# Patient Record
Sex: FEMALE | Race: WHITE | NOT HISPANIC OR LATINO | Employment: FULL TIME | URBAN - METROPOLITAN AREA
[De-identification: names, ages, dates, MRNs, and addresses within clinical notes are randomized per-mention and may not be internally consistent; named-entity substitution may affect disease eponyms.]

---

## 2017-03-20 RX ORDER — LEVOTHYROXINE SODIUM 50 MCG
TABLET ORAL
Qty: 90 TABLET | Refills: 0 | Status: SHIPPED | OUTPATIENT
Start: 2017-03-20 | End: 2018-01-08 | Stop reason: SDUPTHER

## 2017-07-19 ENCOUNTER — TRANSCRIBE ORDERS (OUTPATIENT)
Dept: MAMMOGRAPHY | Facility: HOSPITAL | Age: 47
End: 2017-07-19

## 2017-07-19 DIAGNOSIS — Z13.9 SCREENING: Primary | ICD-10-CM

## 2017-08-15 ENCOUNTER — HOSPITAL ENCOUNTER (OUTPATIENT)
Dept: MAMMOGRAPHY | Facility: HOSPITAL | Age: 47
Discharge: HOME | End: 2017-08-15
Attending: INTERNAL MEDICINE | Admitting: INTERNAL MEDICINE

## 2017-08-15 DIAGNOSIS — Z13.9 SCREENING: ICD-10-CM

## 2017-08-15 PROCEDURE — 77063 BREAST TOMOSYNTHESIS BI: CPT

## 2017-08-15 PROCEDURE — 77063 BREAST TOMOSYNTHESIS BI: CPT | Performed by: RADIOLOGY

## 2017-08-15 PROCEDURE — G0202 SCR MAMMO BI INCL CAD: HCPCS

## 2017-08-15 PROCEDURE — 77067 SCR MAMMO BI INCL CAD: CPT | Performed by: RADIOLOGY

## 2017-10-14 DIAGNOSIS — E03.9 HYPOTHYROIDISM (ACQUIRED): ICD-10-CM

## 2017-10-14 RX ORDER — LEVOTHYROXINE SODIUM 0.05 MG/1
TABLET ORAL
Qty: 30 TABLET | Refills: 0 | Status: SHIPPED | OUTPATIENT
Start: 2017-10-14 | End: 2017-11-12 | Stop reason: SDUPTHER

## 2017-11-12 DIAGNOSIS — E03.9 HYPOTHYROIDISM (ACQUIRED): ICD-10-CM

## 2017-11-12 RX ORDER — LEVOTHYROXINE SODIUM 0.05 MG/1
TABLET ORAL
Qty: 30 TABLET | Refills: 0 | Status: SHIPPED | OUTPATIENT
Start: 2017-11-12 | End: 2017-12-13 | Stop reason: SDUPTHER

## 2017-12-13 DIAGNOSIS — E03.9 HYPOTHYROIDISM (ACQUIRED): ICD-10-CM

## 2017-12-13 RX ORDER — LEVOTHYROXINE SODIUM 0.05 MG/1
TABLET ORAL
Qty: 30 TABLET | Refills: 0 | Status: SHIPPED | OUTPATIENT
Start: 2017-12-13 | End: 2018-01-14 | Stop reason: SDUPTHER

## 2018-01-08 ENCOUNTER — OFFICE VISIT (OUTPATIENT)
Dept: ENDOCRINOLOGY | Facility: CLINIC | Age: 48
End: 2018-01-08

## 2018-01-08 VITALS
WEIGHT: 166 LBS | SYSTOLIC BLOOD PRESSURE: 120 MMHG | BODY MASS INDEX: 29.41 KG/M2 | DIASTOLIC BLOOD PRESSURE: 74 MMHG | HEART RATE: 77 BPM | OXYGEN SATURATION: 99 %

## 2018-01-08 DIAGNOSIS — E03.9 ACQUIRED HYPOTHYROIDISM: Primary | Chronic | ICD-10-CM

## 2018-01-08 DIAGNOSIS — E78.2 MIXED HYPERLIPIDEMIA: Chronic | ICD-10-CM

## 2018-01-08 DIAGNOSIS — E55.9 VITAMIN D DEFICIENCY: ICD-10-CM

## 2018-01-08 LAB
25(OH)D3 SERPL-MCNC: 35.7 NG/ML
ALBUMIN SERPL-MCNC: 4.2 G/DL (ref 3.2–4.8)
ALBUMIN/GLOB SERPL: 1.8 G/DL (ref 1.5–2.5)
ALP SERPL-CCNC: 82 U/L (ref 25–100)
ALT SERPL W P-5'-P-CCNC: 17 U/L (ref 7–40)
ANION GAP SERPL CALCULATED.3IONS-SCNC: 8 MMOL/L (ref 3–11)
ARTICHOKE IGE QN: 111 MG/DL (ref 0–130)
AST SERPL-CCNC: 21 U/L (ref 0–33)
BILIRUB SERPL-MCNC: 0.3 MG/DL (ref 0.3–1.2)
BUN BLD-MCNC: 16 MG/DL (ref 9–23)
BUN/CREAT SERPL: 16 (ref 7–25)
CALCIUM SPEC-SCNC: 9 MG/DL (ref 8.7–10.4)
CHLORIDE SERPL-SCNC: 104 MMOL/L (ref 99–109)
CHOLEST SERPL-MCNC: 210 MG/DL (ref 0–200)
CO2 SERPL-SCNC: 24 MMOL/L (ref 20–31)
CREAT BLD-MCNC: 1 MG/DL (ref 0.6–1.3)
GFR SERPL CREATININE-BSD FRML MDRD: 59 ML/MIN/1.73
GLOBULIN UR ELPH-MCNC: 2.3 GM/DL
GLUCOSE BLD-MCNC: 89 MG/DL (ref 70–100)
HDLC SERPL-MCNC: 91 MG/DL (ref 40–60)
POTASSIUM BLD-SCNC: 3.9 MMOL/L (ref 3.5–5.5)
PROT SERPL-MCNC: 6.5 G/DL (ref 5.7–8.2)
SODIUM BLD-SCNC: 136 MMOL/L (ref 132–146)
T4 FREE SERPL-MCNC: 1.27 NG/DL (ref 0.89–1.76)
TRIGL SERPL-MCNC: 169 MG/DL (ref 0–150)
TSH SERPL DL<=0.05 MIU/L-ACNC: 1.11 MIU/ML (ref 0.35–5.35)

## 2018-01-08 PROCEDURE — 86376 MICROSOMAL ANTIBODY EACH: CPT | Performed by: INTERNAL MEDICINE

## 2018-01-08 PROCEDURE — 82306 VITAMIN D 25 HYDROXY: CPT | Performed by: INTERNAL MEDICINE

## 2018-01-08 PROCEDURE — 84439 ASSAY OF FREE THYROXINE: CPT | Performed by: INTERNAL MEDICINE

## 2018-01-08 PROCEDURE — 80061 LIPID PANEL: CPT | Performed by: INTERNAL MEDICINE

## 2018-01-08 PROCEDURE — 86800 THYROGLOBULIN ANTIBODY: CPT | Performed by: INTERNAL MEDICINE

## 2018-01-08 PROCEDURE — 84443 ASSAY THYROID STIM HORMONE: CPT | Performed by: INTERNAL MEDICINE

## 2018-01-08 PROCEDURE — 99213 OFFICE O/P EST LOW 20 MIN: CPT | Performed by: INTERNAL MEDICINE

## 2018-01-08 PROCEDURE — 80053 COMPREHEN METABOLIC PANEL: CPT | Performed by: INTERNAL MEDICINE

## 2018-01-08 RX ORDER — TRAZODONE HYDROCHLORIDE 100 MG/1
.5-1 TABLET ORAL NIGHTLY
COMMUNITY
Start: 2017-12-24 | End: 2021-09-03 | Stop reason: SINTOL

## 2018-01-08 RX ORDER — FLUOXETINE HYDROCHLORIDE 40 MG/1
1 CAPSULE ORAL DAILY
COMMUNITY
Start: 2017-12-24 | End: 2021-09-23 | Stop reason: SDUPTHER

## 2018-01-08 NOTE — PROGRESS NOTES
Mona Wilkerson 47 y.o.  [facility]: Follow-up (Hypothyroidism)    [place]: Follow-up (Hypothyroidism)    On synthroid 50 mcg daily   Last lab work was 1 year ago   Is also vitamin D deficient on supplement   Is on low fat diet    Allergies   Allergen Reactions   • Other      Uncoded Nonscreenable Allergen       Current Outpatient Prescriptions:   •  BIOTIN ULTRA STRENGTH PO, Take  by mouth., Disp: , Rfl:   •  Cholecalciferol (VITAMIN D3) 5000 UNITS capsule capsule, Take  by mouth., Disp: , Rfl:   •  FLUoxetine (PROzac) 40 MG capsule, Take 1 capsule by mouth Daily., Disp: , Rfl:   •  levothyroxine (SYNTHROID, LEVOTHROID) 50 MCG tablet, TAKE 1 TABLET BY MOUTH EVERY DAY, Disp: 30 tablet, Rfl: 0  •  ranitidine (ZANTAC) 75 MG tablet, Take 1 tablet by mouth 2 (two) times a day., Disp: , Rfl:   •  traZODone (DESYREL) 100 MG tablet, Take 1 tablet by mouth Daily., Disp: , Rfl:   Patient Active Problem List    Diagnosis   • Anxiety [F41.9]   • Primary insomnia [F51.01]   • Cyst of ovary [N83.209]   • Vitamin D deficiency [E55.9]   • Hypothyroidism [E03.9]     Overview Note:     Impression: 03/09/2016 - check tfts  Impression: 01/26/2016 - reviewed tfts over past 2 years, with tsh 2.0 in 2014   current tsh 0.6 - in good range   no change for now but recc recheck in 6 weeks on lithium  Impression: 12/31/2015 - update tfts on supplement   continue supplement for now- improved mood on naturethyroid- was suicidally depressed previously now resolved.  Is still depressed but denies suicidal thoughts or plan   is agreeable with appt [facility];   Check TFTs     • Depression with anxiety [F41.8]     Overview Note:     seeing [facility]- doing well  off lithium  changed to lamictal getting adhd testing     • Hyperlipidemia [E78.5]     Overview Note:     26 Jan 2016  reviewed lipid panel with her      • GERD without esophagitis [K21.9]     Review of Systems   Constitutional: Negative for activity change, appetite change, chills,  diaphoresis, fatigue, fever and unexpected weight change.   HENT: Negative for congestion, dental problem, drooling, ear discharge, ear pain, facial swelling, hearing loss, mouth sores, nosebleeds, postnasal drip, rhinorrhea, sinus pressure, sneezing, sore throat, tinnitus, trouble swallowing and voice change.    Eyes: Negative for photophobia, pain, discharge, redness, itching and visual disturbance.   Respiratory: Negative for apnea, cough, choking, chest tightness, shortness of breath, wheezing and stridor.    Cardiovascular: Negative for chest pain, palpitations and leg swelling.   Gastrointestinal: Negative for abdominal distention, abdominal pain, anal bleeding, blood in stool, constipation, diarrhea, nausea, rectal pain and vomiting.   Endocrine: Negative for cold intolerance, heat intolerance, polydipsia, polyphagia and polyuria.   Genitourinary: Negative for decreased urine volume, difficulty urinating, dysuria, enuresis, flank pain, frequency, genital sores, hematuria and urgency.   Musculoskeletal: Negative for arthralgias, back pain, gait problem, joint swelling, myalgias, neck pain and neck stiffness.   Skin: Negative for color change, pallor, rash and wound.   Allergic/Immunologic: Negative for environmental allergies, food allergies and immunocompromised state.   Neurological: Negative for dizziness, tremors, seizures, syncope, facial asymmetry, speech difficulty, weakness, light-headedness, numbness and headaches.   Hematological: Negative for adenopathy. Does not bruise/bleed easily.   Psychiatric/Behavioral: Negative for agitation, behavioral problems, confusion, decreased concentration, dysphoric mood, hallucinations, self-injury, sleep disturbance and suicidal ideas. The patient is not nervous/anxious and is not hyperactive.      Social History     Social History   • Marital status:      Spouse name: N/A   • Number of children: N/A   • Years of education: N/A     Occupational History   •  Not on file.     Social History Main Topics   • Smoking status: Former Smoker     Types: Cigarettes   • Smokeless tobacco: Never Used      Comment: Tobacco use   • Alcohol use No   • Drug use: No   • Sexual activity: Not Currently     Partners: Male     Birth control/ protection: None     Other Topics Concern   • Not on file     Social History Narrative     Family History   Problem Relation Age of Onset   • Thyroid disease Mother    • Breast cancer Mother 53   • Hypertension Mother      Essential HTN   • Birth defects Mother    • Hyperlipidemia Mother    • Alcohol abuse Sister    • Hypertension Sister      Essential HTN   • Hyperlipidemia Sister    • Migraines Sister      Migraine headaches   • Diabetes Paternal Aunt    • Hypertension Father      Essential HTN   • Hyperlipidemia Father      /74  Pulse 77  Wt 75.3 kg (166 lb)  SpO2 99%  BMI 29.41 kg/m2  Physical Exam   Constitutional: She is oriented to person, place, and time. She appears well-developed and well-nourished.   HENT:   Head: Normocephalic and atraumatic.   Nose: Nose normal.   Mouth/Throat: Oropharynx is clear and moist.   Eyes: Conjunctivae, EOM and lids are normal. Pupils are equal, round, and reactive to light.   Neck: Trachea normal and normal range of motion. Neck supple. Carotid bruit is not present. No tracheal deviation present. No thyroid mass and no thyromegaly present.   Cardiovascular: Normal rate, regular rhythm, normal heart sounds and intact distal pulses.  Exam reveals no gallop and no friction rub.    No murmur heard.  Pulmonary/Chest: Effort normal and breath sounds normal. No respiratory distress. She has no wheezes.   Musculoskeletal: Normal range of motion. She exhibits no edema or deformity.   Lymphadenopathy:     She has no cervical adenopathy.   Neurological: She is alert and oriented to person, place, and time. She has normal reflexes. She displays normal reflexes. No cranial nerve deficit.   Skin: Skin is warm and  dry. No rash noted. No cyanosis or erythema. Nails show no clubbing.   Psychiatric: She has a normal mood and affect. Her speech is normal and behavior is normal. Judgment and thought content normal. Cognition and memory are normal.   Nursing note and vitals reviewed.    Results for orders placed or performed in visit on 11/21/16   Comprehensive Metabolic Panel   Result Value Ref Range    Glucose 154 (H) 70 - 100 mg/dL    BUN 8 (L) 9 - 23 mg/dL    Creatinine 0.90 0.60 - 1.30 mg/dL    Sodium 139 132 - 146 mmol/L    Potassium 4.1 3.5 - 5.5 mmol/L    Chloride 109 99 - 109 mmol/L    CO2 25.0 20.0 - 31.0 mmol/L    Calcium 9.1 8.7 - 10.4 mg/dL    Total Protein 6.6 5.7 - 8.2 g/dL    Albumin 4.10 3.20 - 4.80 g/dL    ALT (SGPT) 17 7 - 40 U/L    AST (SGOT) 19 0 - 33 U/L    Alkaline Phosphatase 80 25 - 100 U/L    Total Bilirubin 0.2 (L) 0.3 - 1.2 mg/dL    eGFR Non African Amer 67 >60 mL/min/1.73    Globulin 2.5 gm/dL    A/G Ratio 1.6 g/dL    BUN/Creatinine Ratio 8.9 7.0 - 25.0    Anion Gap 5.0 3.0 - 11.0 mmol/L   TSH   Result Value Ref Range    TSH 1.597 0.350 - 5.350 mIU/mL   Lipid Panel   Result Value Ref Range    Total Cholesterol 209 (H) 0 - 200 mg/dL    Triglycerides 154 (H) 0 - 150 mg/dL    HDL Cholesterol 72 (H) 40 - 60 mg/dL    LDL Cholesterol  111 0 - 130 mg/dL   Vitamin D 25 Hydroxy   Result Value Ref Range    25 Hydroxy, Vitamin D 51.4 ng/ml   T4, Free   Result Value Ref Range    Free T4 1.28 0.89 - 1.76 ng/dL     Problem List Items Addressed This Visit        Cardiovascular and Mediastinum    Hyperlipidemia (Chronic)     Check flp             Digestive    Vitamin D deficiency     Update vitamin D level          Relevant Orders    T4, Free    TSH    Thyroid Antibodies    Comprehensive Metabolic Panel    Lipid Panel    Vitamin D 25 Hydroxy       Endocrine    Hypothyroidism - Primary (Chronic)     Check tfts          Relevant Orders    T4, Free    TSH    Thyroid Antibodies    Comprehensive Metabolic Panel    Lipid  Panel    Vitamin D 25 Hydroxy        Return in about 1 year (around 1/8/2019) for Recheck 30 min .    Susi Szymanski MA  Signed Alexandra Yu MD

## 2018-01-10 LAB
THYROGLOB AB SERPL-ACNC: 6.6 IU/ML (ref 0–0.9)
THYROPEROXIDASE AB SERPL-ACNC: 10 IU/ML (ref 0–34)

## 2018-01-14 DIAGNOSIS — E03.9 HYPOTHYROIDISM (ACQUIRED): ICD-10-CM

## 2018-01-14 RX ORDER — LEVOTHYROXINE SODIUM 0.05 MG/1
TABLET ORAL
Qty: 30 TABLET | Refills: 0 | Status: SHIPPED | OUTPATIENT
Start: 2018-01-14 | End: 2021-09-23 | Stop reason: SDUPTHER

## 2018-08-24 ENCOUNTER — TRANSCRIBE ORDERS (OUTPATIENT)
Dept: ADMINISTRATIVE | Facility: HOSPITAL | Age: 48
End: 2018-08-24

## 2018-08-24 DIAGNOSIS — Z12.31 VISIT FOR SCREENING MAMMOGRAM: Primary | ICD-10-CM

## 2018-09-27 ENCOUNTER — HOSPITAL ENCOUNTER (OUTPATIENT)
Dept: MAMMOGRAPHY | Facility: HOSPITAL | Age: 48
Discharge: HOME | End: 2018-09-27
Admitting: INTERNAL MEDICINE

## 2018-09-27 DIAGNOSIS — Z12.31 VISIT FOR SCREENING MAMMOGRAM: ICD-10-CM

## 2018-09-27 PROCEDURE — 77067 SCR MAMMO BI INCL CAD: CPT | Performed by: RADIOLOGY

## 2018-09-27 PROCEDURE — 77063 BREAST TOMOSYNTHESIS BI: CPT | Performed by: RADIOLOGY

## 2018-09-27 PROCEDURE — 77063 BREAST TOMOSYNTHESIS BI: CPT

## 2018-09-27 PROCEDURE — 77067 SCR MAMMO BI INCL CAD: CPT

## 2018-10-01 ENCOUNTER — APPOINTMENT (OUTPATIENT)
Dept: MAMMOGRAPHY | Facility: HOSPITAL | Age: 48
End: 2018-10-01

## 2020-01-31 ENCOUNTER — TRANSCRIBE ORDERS (OUTPATIENT)
Dept: ADMINISTRATIVE | Facility: HOSPITAL | Age: 50
End: 2020-01-31

## 2020-01-31 DIAGNOSIS — Z12.31 VISIT FOR SCREENING MAMMOGRAM: Primary | ICD-10-CM

## 2021-04-30 ENCOUNTER — LAB (OUTPATIENT)
Dept: LAB | Facility: HOSPITAL | Age: 51
End: 2021-04-30

## 2021-04-30 ENCOUNTER — OFFICE VISIT (OUTPATIENT)
Dept: INTERNAL MEDICINE | Facility: CLINIC | Age: 51
End: 2021-04-30

## 2021-04-30 VITALS
TEMPERATURE: 96.9 F | RESPIRATION RATE: 16 BRPM | WEIGHT: 184.2 LBS | OXYGEN SATURATION: 97 % | SYSTOLIC BLOOD PRESSURE: 148 MMHG | DIASTOLIC BLOOD PRESSURE: 82 MMHG | HEART RATE: 73 BPM | HEIGHT: 63 IN | BODY MASS INDEX: 32.64 KG/M2

## 2021-04-30 DIAGNOSIS — E03.9 ACQUIRED HYPOTHYROIDISM: ICD-10-CM

## 2021-04-30 DIAGNOSIS — Z86.39 HISTORY OF VITAMIN D DEFICIENCY: ICD-10-CM

## 2021-04-30 DIAGNOSIS — Z00.00 HEALTHCARE MAINTENANCE: ICD-10-CM

## 2021-04-30 DIAGNOSIS — Z83.71 FAMILY HISTORY OF COLONIC POLYPS: ICD-10-CM

## 2021-04-30 DIAGNOSIS — Z12.11 SCREENING FOR MALIGNANT NEOPLASM OF COLON: ICD-10-CM

## 2021-04-30 DIAGNOSIS — Z13.220 SCREENING, LIPID: ICD-10-CM

## 2021-04-30 DIAGNOSIS — N39.46 MIXED URGE AND STRESS INCONTINENCE: Primary | ICD-10-CM

## 2021-04-30 DIAGNOSIS — F99 INSOMNIA DUE TO OTHER MENTAL DISORDER: ICD-10-CM

## 2021-04-30 DIAGNOSIS — Z72.0 TOBACCO USE: ICD-10-CM

## 2021-04-30 DIAGNOSIS — Z78.9 ALCOHOL USE: ICD-10-CM

## 2021-04-30 DIAGNOSIS — F41.9 ANXIETY: ICD-10-CM

## 2021-04-30 DIAGNOSIS — Z13.1 SCREENING FOR DIABETES MELLITUS: ICD-10-CM

## 2021-04-30 DIAGNOSIS — F51.05 INSOMNIA DUE TO OTHER MENTAL DISORDER: ICD-10-CM

## 2021-04-30 DIAGNOSIS — Z11.59 ENCOUNTER FOR HEPATITIS C SCREENING TEST FOR LOW RISK PATIENT: ICD-10-CM

## 2021-04-30 DIAGNOSIS — F33.41 RECURRENT MAJOR DEPRESSIVE DISORDER, IN PARTIAL REMISSION (HCC): ICD-10-CM

## 2021-04-30 DIAGNOSIS — K21.9 GERD WITHOUT ESOPHAGITIS: Chronic | ICD-10-CM

## 2021-04-30 DIAGNOSIS — Z12.31 ENCOUNTER FOR SCREENING MAMMOGRAM FOR MALIGNANT NEOPLASM OF BREAST: ICD-10-CM

## 2021-04-30 PROBLEM — F10.90 ALCOHOL USE: Status: ACTIVE | Noted: 2021-04-30

## 2021-04-30 PROBLEM — Z83.719 FAMILY HISTORY OF COLONIC POLYPS: Status: ACTIVE | Noted: 2021-04-30

## 2021-04-30 PROCEDURE — 86803 HEPATITIS C AB TEST: CPT

## 2021-04-30 PROCEDURE — 83036 HEMOGLOBIN GLYCOSYLATED A1C: CPT

## 2021-04-30 PROCEDURE — 80053 COMPREHEN METABOLIC PANEL: CPT

## 2021-04-30 PROCEDURE — 85027 COMPLETE CBC AUTOMATED: CPT

## 2021-04-30 PROCEDURE — 99204 OFFICE O/P NEW MOD 45 MIN: CPT | Performed by: INTERNAL MEDICINE

## 2021-04-30 PROCEDURE — 80061 LIPID PANEL: CPT

## 2021-04-30 PROCEDURE — 82306 VITAMIN D 25 HYDROXY: CPT

## 2021-04-30 PROCEDURE — 84443 ASSAY THYROID STIM HORMONE: CPT

## 2021-04-30 RX ORDER — OMEPRAZOLE 20 MG/1
20 CAPSULE, DELAYED RELEASE ORAL DAILY
COMMUNITY
End: 2021-09-23 | Stop reason: SDUPTHER

## 2021-05-01 LAB
25(OH)D3 SERPL-MCNC: 31 NG/ML (ref 30–100)
ALBUMIN SERPL-MCNC: 4.7 G/DL (ref 3.5–5.2)
ALBUMIN/GLOB SERPL: 1.6 G/DL
ALP SERPL-CCNC: 117 U/L (ref 39–117)
ALT SERPL W P-5'-P-CCNC: 18 U/L (ref 1–33)
ANION GAP SERPL CALCULATED.3IONS-SCNC: 13.2 MMOL/L (ref 5–15)
AST SERPL-CCNC: 23 U/L (ref 1–32)
BILIRUB SERPL-MCNC: 0.2 MG/DL (ref 0–1.2)
BUN SERPL-MCNC: 11 MG/DL (ref 6–20)
BUN/CREAT SERPL: 11.1 (ref 7–25)
CALCIUM SPEC-SCNC: 9.5 MG/DL (ref 8.6–10.5)
CHLORIDE SERPL-SCNC: 99 MMOL/L (ref 98–107)
CHOLEST SERPL-MCNC: 226 MG/DL (ref 0–200)
CO2 SERPL-SCNC: 25.8 MMOL/L (ref 22–29)
CREAT SERPL-MCNC: 0.99 MG/DL (ref 0.57–1)
DEPRECATED RDW RBC AUTO: 41.9 FL (ref 37–54)
ERYTHROCYTE [DISTWIDTH] IN BLOOD BY AUTOMATED COUNT: 15.1 % (ref 12.3–15.4)
GFR SERPL CREATININE-BSD FRML MDRD: 59 ML/MIN/1.73
GLOBULIN UR ELPH-MCNC: 3 GM/DL
GLUCOSE SERPL-MCNC: 88 MG/DL (ref 65–99)
HBA1C MFR BLD: 4.95 % (ref 4.8–5.6)
HCT VFR BLD AUTO: 36 % (ref 34–46.6)
HCV AB SER DONR QL: NORMAL
HDLC SERPL-MCNC: 98 MG/DL (ref 40–60)
HGB BLD-MCNC: 11.2 G/DL (ref 12–15.9)
LDLC SERPL CALC-MCNC: 104 MG/DL (ref 0–100)
LDLC/HDLC SERPL: 1.01 {RATIO}
MCH RBC QN AUTO: 24.1 PG (ref 26.6–33)
MCHC RBC AUTO-ENTMCNC: 31.1 G/DL (ref 31.5–35.7)
MCV RBC AUTO: 77.6 FL (ref 79–97)
PLATELET # BLD AUTO: 323 10*3/MM3 (ref 140–450)
PMV BLD AUTO: 10.2 FL (ref 6–12)
POTASSIUM SERPL-SCNC: 4 MMOL/L (ref 3.5–5.2)
PROT SERPL-MCNC: 7.7 G/DL (ref 6–8.5)
RBC # BLD AUTO: 4.64 10*6/MM3 (ref 3.77–5.28)
SODIUM SERPL-SCNC: 138 MMOL/L (ref 136–145)
TRIGL SERPL-MCNC: 144 MG/DL (ref 0–150)
TSH SERPL DL<=0.05 MIU/L-ACNC: 1.39 UIU/ML (ref 0.27–4.2)
VLDLC SERPL-MCNC: 24 MG/DL (ref 5–40)
WBC # BLD AUTO: 6.76 10*3/MM3 (ref 3.4–10.8)

## 2021-05-05 DIAGNOSIS — N18.31 STAGE 3A CHRONIC KIDNEY DISEASE (HCC): Primary | ICD-10-CM

## 2021-05-05 DIAGNOSIS — D50.9 MICROCYTIC ANEMIA: ICD-10-CM

## 2021-05-10 ENCOUNTER — LAB (OUTPATIENT)
Dept: LAB | Facility: HOSPITAL | Age: 51
End: 2021-05-10

## 2021-05-10 DIAGNOSIS — D50.9 MICROCYTIC ANEMIA: ICD-10-CM

## 2021-05-10 DIAGNOSIS — N18.31 STAGE 3A CHRONIC KIDNEY DISEASE (HCC): ICD-10-CM

## 2021-05-10 LAB
BACTERIA UR QL AUTO: ABNORMAL /HPF
BILIRUB UR QL STRIP: NEGATIVE
CLARITY UR: CLEAR
COLOR UR: YELLOW
CREAT UR-MCNC: 31.8 MG/DL
FERRITIN SERPL-MCNC: 13.3 NG/ML (ref 13–150)
FOLATE SERPL-MCNC: 9.55 NG/ML (ref 4.78–24.2)
GLUCOSE UR STRIP-MCNC: NEGATIVE MG/DL
HGB UR QL STRIP.AUTO: NEGATIVE
HYALINE CASTS UR QL AUTO: ABNORMAL /LPF
IRON 24H UR-MRATE: 39 MCG/DL (ref 37–145)
IRON SATN MFR SERPL: 6 % (ref 20–50)
KETONES UR QL STRIP: NEGATIVE
LEUKOCYTE ESTERASE UR QL STRIP.AUTO: ABNORMAL
NITRITE UR QL STRIP: NEGATIVE
PH UR STRIP.AUTO: 6.5 [PH] (ref 5–8)
PROT UR QL STRIP: NEGATIVE
PROT UR-MCNC: <4 MG/DL
PROT/CREAT UR: NORMAL MG/G{CREAT}
RBC # UR: ABNORMAL /HPF
REF LAB TEST METHOD: ABNORMAL
SP GR UR STRIP: <=1.005 (ref 1–1.03)
SQUAMOUS #/AREA URNS HPF: ABNORMAL /HPF
TIBC SERPL-MCNC: 615 MCG/DL (ref 298–536)
TRANSFERRIN SERPL-MCNC: 413 MG/DL (ref 200–360)
UROBILINOGEN UR QL STRIP: ABNORMAL
VIT B12 BLD-MCNC: 447 PG/ML (ref 211–946)
WBC UR QL AUTO: ABNORMAL /HPF

## 2021-05-10 PROCEDURE — 82746 ASSAY OF FOLIC ACID SERUM: CPT

## 2021-05-10 PROCEDURE — 83540 ASSAY OF IRON: CPT

## 2021-05-10 PROCEDURE — 82570 ASSAY OF URINE CREATININE: CPT

## 2021-05-10 PROCEDURE — 82728 ASSAY OF FERRITIN: CPT

## 2021-05-10 PROCEDURE — 81001 URINALYSIS AUTO W/SCOPE: CPT

## 2021-05-10 PROCEDURE — 82607 VITAMIN B-12: CPT

## 2021-05-10 PROCEDURE — 84156 ASSAY OF PROTEIN URINE: CPT

## 2021-05-10 PROCEDURE — 84466 ASSAY OF TRANSFERRIN: CPT

## 2021-05-11 ENCOUNTER — TELEPHONE (OUTPATIENT)
Dept: INTERNAL MEDICINE | Facility: CLINIC | Age: 51
End: 2021-05-11

## 2021-05-11 DIAGNOSIS — N30.00 ACUTE CYSTITIS WITHOUT HEMATURIA: Primary | ICD-10-CM

## 2021-05-12 ENCOUNTER — TELEPHONE (OUTPATIENT)
Dept: INTERNAL MEDICINE | Facility: CLINIC | Age: 51
End: 2021-05-12

## 2021-05-12 RX ORDER — CEFDINIR 300 MG/1
300 CAPSULE ORAL 2 TIMES DAILY
Qty: 14 CAPSULE | Refills: 0 | Status: SHIPPED | OUTPATIENT
Start: 2021-05-12 | End: 2021-05-20

## 2021-05-12 NOTE — TELEPHONE ENCOUNTER
I have sent cefdinir in to her pharmacy. If symptoms do not improve she will need to come to the office for evaluation.

## 2021-05-12 NOTE — TELEPHONE ENCOUNTER
Results have been sent to her mychart. Urine findings are only consistent with UTI if she is having UTI symptoms. Please call and clarify if she has developed any symptoms since her visit. If so I can call in an antibiotic.

## 2021-05-12 NOTE — TELEPHONE ENCOUNTER
Caller: Mona Wilkerson    Relationship: Self    Best call back number: [phone]    What medication are you requesting: ANABIOTIC     What are your current symptoms: BURNING     How long have you been experiencing symptoms: A COUPLE OF MONTH    Have you had these symptoms before:    [x] Yes  [] No    Have you been treated for these symptoms before:   [x] Yes  [] No    If a prescription is needed, what is your preferred pharmacy and phone number: [facility]

## 2021-05-13 RX ORDER — SODIUM, POTASSIUM,MAG SULFATES 17.5-3.13G
2 SOLUTION, RECONSTITUTED, ORAL ORAL TAKE AS DIRECTED
Qty: 354 ML | Refills: 0 | Status: SHIPPED | OUTPATIENT
Start: 2021-05-13 | End: 2021-11-01

## 2021-06-17 ENCOUNTER — HOSPITAL ENCOUNTER (OUTPATIENT)
Dept: MAMMOGRAPHY | Facility: HOSPITAL | Age: 51
Discharge: HOME | End: 2021-06-17

## 2021-06-17 ENCOUNTER — HOSPITAL ENCOUNTER (OUTPATIENT)
Dept: CT IMAGING | Facility: HOSPITAL | Age: 51
Discharge: HOME | End: 2021-06-17

## 2021-06-17 DIAGNOSIS — Z72.0 TOBACCO USE: ICD-10-CM

## 2021-06-17 DIAGNOSIS — Z12.31 ENCOUNTER FOR SCREENING MAMMOGRAM FOR MALIGNANT NEOPLASM OF BREAST: ICD-10-CM

## 2021-06-17 PROCEDURE — 77067 SCR MAMMO BI INCL CAD: CPT

## 2021-06-17 PROCEDURE — 71271 CT THORAX LUNG CANCER SCR C-: CPT

## 2021-06-17 PROCEDURE — 77067 SCR MAMMO BI INCL CAD: CPT | Performed by: RADIOLOGY

## 2021-06-17 PROCEDURE — 77063 BREAST TOMOSYNTHESIS BI: CPT | Performed by: RADIOLOGY

## 2021-06-17 PROCEDURE — 77063 BREAST TOMOSYNTHESIS BI: CPT

## 2021-06-21 ENCOUNTER — TREATMENT (OUTPATIENT)
Dept: PHYSICAL THERAPY | Facility: CLINIC | Age: 51
End: 2021-06-21

## 2021-06-21 DIAGNOSIS — R39.15 URINARY URGENCY: ICD-10-CM

## 2021-06-21 DIAGNOSIS — N39.46 MIXED URGE AND STRESS INCONTINENCE: Primary | ICD-10-CM

## 2021-06-21 DIAGNOSIS — M62.89 PELVIC FLOOR DYSFUNCTION: ICD-10-CM

## 2021-06-21 PROCEDURE — 97162 PT EVAL MOD COMPLEX 30 MIN: CPT | Performed by: PHYSICAL THERAPIST

## 2021-06-21 PROCEDURE — 97110 THERAPEUTIC EXERCISES: CPT | Performed by: PHYSICAL THERAPIST

## 2021-06-21 NOTE — PROGRESS NOTES
Physical Therapy Initial Evaluation and Plan of Care    Patient: Mona Wilkerson   : 1970  Diagnosis/ICD-10 Code:  Mixed urge and stress incontinence [N39.46]  Referring practitioner: Muna Huitron MD  Date of Initial Visit: 2021  Today's Date: 2021  Patient seen for 1 sessions      Subjective    For a few years has had urgency and worsened as far as making it in time. Was a tea drinker all day long and has cut it out and this has made a huge difference with urgency. Had been drinking alcohol heavily and is cutting that out. Had 2 episodes where lost it in [employer] and wet herself. Saw MD back in May and had a UTI and no symptoms other than urgency. In the last year noticing more coughing/sneezing leakage as well. Crossing legs to hold it.     Chief Complaint:   Chief Complaint   Patient presents with   • Initial Evaluation      Functional Outcome Measure: PFDI  20    Pain  Denies issues    Bladder function:    Incontinence: Prior when drinking tea/alcohol all day long; Not as often with drinking water  # of pads in 24 hours: Refused to wear pads   Leak at night: no  Urination at night: 0-1  Use bathroom “just in case”: no   Strong urinary urge: yes  Leaking with urge: yes  Urge triggers: tea, alcohol  Complete bladder voiding: yes now; prior felt like output didn't match urgency  Urinary splaying: no  Post-micturition dribble: no  Frequency of urination: with drinking tea 30-60 minutes; now 2-3 hours  Discomfort with urination: no  Vaginal or anal itching: no  Fluid consumed daily: gets about a gallon per day    Bowel function:  Denies issues  How many episodes of leakage/month: no  How many BM's/day: daily to every other day  [place]ry Stool Scale Type: 3-4  Discomfort with BM: no  Complete bowel voiding %: 100      Sexual activity  Sexually active: not active for the last 10 years.       Prior PT or other treatment: none    Diagnostics:    PMH:   Past Medical History:   Diagnosis Date   •  Cyst of ovary 2016   • Depression    • Depression with anxiety 2016    seeing [facility]- doing well  off lithium  changed to lamictal getting adhd testing   • GERD (gastroesophageal reflux disease)    • Hyperlipidemia 2016  reviewed lipid panel with her    • Hypothyroidism    • Primary insomnia 2016   • Sleep difficulties         Surgical HX:   Past Surgical History:   Procedure Laterality Date   • ENDOMETRIAL ABLATION W/ NOVASURE     • ESSURE TUBAL LIGATION     • LASIK Bilateral    • LIPOSUCTION     • RHINOPLASTY          Menstrual cycle: ablation in     Birth HX: A1    Meds:   Current Outpatient Medications:   •  FLUoxetine (PROzac) 40 MG capsule, Take 1 capsule by mouth Daily., Disp: , Rfl:   •  levothyroxine (SYNTHROID, LEVOTHROID) 50 MCG tablet, TAKE 1 TABLET BY MOUTH EVERY DAY, Disp: 30 tablet, Rfl: 0  •  omeprazole (priLOSEC) 20 MG capsule, Take 20 mg by mouth Daily., Disp: , Rfl:   •  sodium-potassium-magnesium sulfates (Suprep Bowel Prep Kit) 17.5-3.13-1.6 GM/177ML solution oral solution, Take 2 bottles by mouth Take As Directed., Disp: 354 mL, Rfl: 0  •  traZODone (DESYREL) 100 MG tablet, Take 0.5-1 tablets by mouth Every Night., Disp: , Rfl:      Occupation: Employee Health Nurse    Activity level/exercise routine: not regular exercise            Objective      verbal consent obtained for internal pelvic exam/treatment with declined need for second person in room    Palpation:   Supine:   Abdominals, Iliacus, psoas - unremarkable for tension B  Mild bladder restrictions L>R  Adductors - unremarkable for tension B    External:    Ischiocavernosus unremarkable for tension B   Supf and deep transverse perineal mm unremarkable for tension B   Sensation intact B   Skin - no gross abnormalities -- normal    Internal:   Sensation: intact B  Bulge: incomplete  Knack: weak  Wall Laxity: mild     L/R supf mm: mild tension B   Levator ani: mild tension B   Obturator internus:  unremarkable for tension B   Compressor urethra: mild tension B    PERF SCALE:  Power: 3    Endurance: 4    Repetitions: 3    Fast twitch: 4    See flowsheet for details of treatment following evaluation.           Assessment/Plan:     The patient is a 51 y.o. female who presents to physical therapy today for mixed incontinence. Upon initial evaluation, the patient demonstrates the following impairments: increased tension with incomplete relaxation of pelvic floor mm. Due to these impairments, the patient is unable to have strong urge or cough/sneeze without incontinence. The patient would benefit from skilled pelvic PT services to address functional limitations and impairments and to improve patient quality of life.      Goals:   STG's: 4 weeks  · Patient will report no incontinence x 1 week for improved QOL  · Patient will report > 25% improvement in urinary symptoms for improved QOL  · Patient will be able to perform HEP with minimal verbal cues    LTG's: By discharge  · Patient will report >75% improvement in urinary symptoms   · Patient will report an elimination of urinary urgency   · Patient will report an elimination of nocturia  · Patient will decrease voiding frequency to once every 3-4 hours  · Patient will be independent with HEP      Plan  Therapy options: will be seen for skilled physical therapy services  Planned modality interventions: TENS, ultrasound, cryotherapy, thermotherapy (hydrocollator packs) and high voltage pulsed current (pain management)  Planned therapy interventions: abdominal trunk stabilization, manual therapy, neuromuscular re-education, body mechanics training, flexibility, functional ROM exercises, gait training, home exercise program, joint mobilization, therapeutic activities, stretching, strengthening, spinal/joint mobilization, soft tissue mobilization and postural training  Pt prognosis: good  Frequency: weekly  Duration in visits: 9  Duration in weeks: 14  Treatment plan  discussed with: patient    1400/1445  Timed:         Manual Therapy:    0     mins  47770;     Therapeutic Exercise:    10     mins  90006;     Neuromuscular Kati:    0    mins  45848;    Therapeutic Activity:     0     mins  32386;     Gait Trainin     mins  38585;     Ultrasound:     0     mins  80787;    Ionto                               0    mins   30485  Self Care                       0     mins   62912  Canalith Repos    0     mins 18591      Un-Timed:  Electrical Stimulation:    0     mins  71749 ( );  Dry Needling     0     mins self-pay  Traction     0     mins 44610  Low Eval     0     Mins  48617  Mod Eval     0     Mins  10864  High Eval                       35     Mins  98565  Re-Eval                           0    mins  30947        Timed Treatment:   10   mins   Total Treatment:     45   mins    PT SIGNATURE:Baljinder Wen PT, DPT  DATE TREATMENT INITIATED: 2021    Initial Certification  Certification Period: 2021  I certify that the therapy services are furnished while this patient is under my care.  The services outlined above are required by this patient, and will be reviewed every 90 days.     PHYSICIAN: Muna Huitron MD      DATE: 2021     Please sign and return via fax to [phone]. Thank you, [facility].

## 2021-08-17 ENCOUNTER — HOSPITAL ENCOUNTER (OUTPATIENT)
Dept: ULTRASOUND IMAGING | Facility: HOSPITAL | Age: 51
Discharge: HOME | End: 2021-08-17
Admitting: INTERNAL MEDICINE

## 2021-08-17 DIAGNOSIS — N18.31 STAGE 3A CHRONIC KIDNEY DISEASE (HCC): ICD-10-CM

## 2021-08-17 PROCEDURE — 76775 US EXAM ABDO BACK WALL LIM: CPT

## 2021-08-21 ENCOUNTER — PATIENT MESSAGE (OUTPATIENT)
Dept: INTERNAL MEDICINE | Facility: CLINIC | Age: 51
End: 2021-08-21

## 2021-08-21 DIAGNOSIS — N18.31 STAGE 3A CHRONIC KIDNEY DISEASE (HCC): Primary | ICD-10-CM

## 2021-08-22 NOTE — TELEPHONE ENCOUNTER
From: Mona Wilkerson  To: Muna Huitron MD  Sent: 8/21/2021 10:29 AM EDT  Subject: Referral Request    Hi Dr. Huitron, thank you for the information.   I would like a referral to kidney specialist. See you on the 3rd for pap and f/u.

## 2021-08-26 ENCOUNTER — TELEPHONE (OUTPATIENT)
Dept: GASTROENTEROLOGY | Facility: CLINIC | Age: 51
End: 2021-08-26

## 2021-09-03 ENCOUNTER — OFFICE VISIT (OUTPATIENT)
Dept: INTERNAL MEDICINE | Facility: CLINIC | Age: 51
End: 2021-09-03

## 2021-09-03 ENCOUNTER — LAB (OUTPATIENT)
Dept: LAB | Facility: HOSPITAL | Age: 51
End: 2021-09-03

## 2021-09-03 VITALS
RESPIRATION RATE: 16 BRPM | HEIGHT: 63 IN | DIASTOLIC BLOOD PRESSURE: 70 MMHG | TEMPERATURE: 98.1 F | SYSTOLIC BLOOD PRESSURE: 138 MMHG | BODY MASS INDEX: 31.71 KG/M2 | HEART RATE: 74 BPM | OXYGEN SATURATION: 97 % | WEIGHT: 179 LBS

## 2021-09-03 DIAGNOSIS — N18.31 STAGE 3A CHRONIC KIDNEY DISEASE (HCC): ICD-10-CM

## 2021-09-03 DIAGNOSIS — F51.05 INSOMNIA DUE TO OTHER MENTAL DISORDER: ICD-10-CM

## 2021-09-03 DIAGNOSIS — Z78.9 ALCOHOL USE: ICD-10-CM

## 2021-09-03 DIAGNOSIS — Z01.419 WELL WOMAN EXAM WITH ROUTINE GYNECOLOGICAL EXAM: Primary | ICD-10-CM

## 2021-09-03 DIAGNOSIS — E03.9 ACQUIRED HYPOTHYROIDISM: ICD-10-CM

## 2021-09-03 DIAGNOSIS — F99 INSOMNIA DUE TO OTHER MENTAL DISORDER: ICD-10-CM

## 2021-09-03 DIAGNOSIS — L23.5 ALLERGIC DERMATITIS DUE TO OTHER CHEMICAL PRODUCT: ICD-10-CM

## 2021-09-03 DIAGNOSIS — D50.9 IRON DEFICIENCY ANEMIA, UNSPECIFIED IRON DEFICIENCY ANEMIA TYPE: ICD-10-CM

## 2021-09-03 LAB
ANION GAP SERPL CALCULATED.3IONS-SCNC: 11 MMOL/L (ref 5–15)
BASOPHILS # BLD AUTO: 0.1 10*3/MM3 (ref 0–0.2)
BASOPHILS NFR BLD AUTO: 1.5 % (ref 0–1.5)
BUN SERPL-MCNC: 14 MG/DL (ref 6–20)
BUN/CREAT SERPL: 16.3 (ref 7–25)
CALCIUM SPEC-SCNC: 9.7 MG/DL (ref 8.6–10.5)
CHLORIDE SERPL-SCNC: 99 MMOL/L (ref 98–107)
CO2 SERPL-SCNC: 26 MMOL/L (ref 22–29)
CREAT SERPL-MCNC: 0.86 MG/DL (ref 0.57–1)
DEPRECATED RDW RBC AUTO: 53.6 FL (ref 37–54)
EOSINOPHIL # BLD AUTO: 0.23 10*3/MM3 (ref 0–0.4)
EOSINOPHIL NFR BLD AUTO: 3.4 % (ref 0.3–6.2)
ERYTHROCYTE [DISTWIDTH] IN BLOOD BY AUTOMATED COUNT: 15.7 % (ref 12.3–15.4)
FERRITIN SERPL-MCNC: 31.2 NG/ML (ref 13–150)
GFR SERPL CREATININE-BSD FRML MDRD: 69 ML/MIN/1.73
GLUCOSE SERPL-MCNC: 91 MG/DL (ref 65–99)
HCT VFR BLD AUTO: 45.3 % (ref 34–46.6)
HGB BLD-MCNC: 15.5 G/DL (ref 12–15.9)
IMM GRANULOCYTES # BLD AUTO: 0.03 10*3/MM3 (ref 0–0.05)
IMM GRANULOCYTES NFR BLD AUTO: 0.4 % (ref 0–0.5)
IRON 24H UR-MRATE: 46 MCG/DL (ref 37–145)
IRON SATN MFR SERPL: 10 % (ref 20–50)
LYMPHOCYTES # BLD AUTO: 2.2 10*3/MM3 (ref 0.7–3.1)
LYMPHOCYTES NFR BLD AUTO: 32.7 % (ref 19.6–45.3)
MCH RBC QN AUTO: 32 PG (ref 26.6–33)
MCHC RBC AUTO-ENTMCNC: 34.2 G/DL (ref 31.5–35.7)
MCV RBC AUTO: 93.4 FL (ref 79–97)
MONOCYTES # BLD AUTO: 0.58 10*3/MM3 (ref 0.1–0.9)
MONOCYTES NFR BLD AUTO: 8.6 % (ref 5–12)
NEUTROPHILS NFR BLD AUTO: 3.59 10*3/MM3 (ref 1.7–7)
NEUTROPHILS NFR BLD AUTO: 53.4 % (ref 42.7–76)
NRBC BLD AUTO-RTO: 0 /100 WBC (ref 0–0.2)
PLATELET # BLD AUTO: 278 10*3/MM3 (ref 140–450)
PMV BLD AUTO: 10.5 FL (ref 6–12)
POTASSIUM SERPL-SCNC: 4.2 MMOL/L (ref 3.5–5.2)
RBC # BLD AUTO: 4.85 10*6/MM3 (ref 3.77–5.28)
SODIUM SERPL-SCNC: 136 MMOL/L (ref 136–145)
TIBC SERPL-MCNC: 444 MCG/DL (ref 298–536)
TRANSFERRIN SERPL-MCNC: 298 MG/DL (ref 200–360)
WBC # BLD AUTO: 6.73 10*3/MM3 (ref 3.4–10.8)

## 2021-09-03 PROCEDURE — 99214 OFFICE O/P EST MOD 30 MIN: CPT | Performed by: INTERNAL MEDICINE

## 2021-09-03 PROCEDURE — 83540 ASSAY OF IRON: CPT

## 2021-09-03 PROCEDURE — 85025 COMPLETE CBC W/AUTO DIFF WBC: CPT

## 2021-09-03 PROCEDURE — 80048 BASIC METABOLIC PNL TOTAL CA: CPT

## 2021-09-03 PROCEDURE — 84466 ASSAY OF TRANSFERRIN: CPT

## 2021-09-03 PROCEDURE — 82728 ASSAY OF FERRITIN: CPT

## 2021-09-03 RX ORDER — AMITRIPTYLINE HYDROCHLORIDE 25 MG/1
25 TABLET, FILM COATED ORAL NIGHTLY
Qty: 30 TABLET | Refills: 2 | Status: SHIPPED | OUTPATIENT
Start: 2021-09-03 | End: 2021-10-05 | Stop reason: SINTOL

## 2021-09-03 RX ORDER — TRIAMCINOLONE ACETONIDE 1 MG/G
CREAM TOPICAL 2 TIMES DAILY
Qty: 30 G | Refills: 0 | Status: SHIPPED | OUTPATIENT
Start: 2021-09-03 | End: 2021-11-01

## 2021-09-03 NOTE — PROGRESS NOTES
"Internal Medicine Follow Up    Chief Complaint  Mona Wilkerson is a 51 y.o. female who presents today for follow up of chronic medical conditions outlined below.    Chief Complaint   Patient presents with   • Follow-up     pap        HPI  Ms. Wilkerson comes in today for pap smear and follow up after last visit. She was found to have iron deficiency and mild anemia. Denies GI or vaginal bleeding. S/p endometrial ablation. She does eat a well balanced diet but continues to drink 6-8 beers daily. She is not able to cut back on her own but is considering counseling and MAT. She has colonoscopy next month. She also has mild CKD3a which has been stable. She will see nephrology for evaluation in November. She wonders if [place] and being dehydr[place] could have caused the decline in kidney function. She notes ongoing insomnia. Takes trazodone 100mg qhs along with benadryl but has \"hangover\" the next day. This happens even with use of lower dose. She has previously used ambien. She has had a rash on both elbows for a few weeks. Started after using aquaphor spray. Was initially itchy, no longer ithces.       Review of Systems  Review of Systems   Constitutional: Positive for fatigue. Negative for unexpected weight loss.   Gastrointestinal: Negative.    Genitourinary: Positive for amenorrhea. Negative for breast discharge, breast lump, breast pain, decreased urine volume, difficulty urinating, vaginal bleeding, vaginal discharge and vaginal pain.   Psychiatric/Behavioral: Positive for sleep disturbance.        Current Medications  Current Outpatient Medications on File Prior to Visit   Medication Sig Dispense Refill   • FLUoxetine (PROzac) 40 MG capsule Take 1 capsule by mouth Daily.     • levothyroxine (SYNTHROID, LEVOTHROID) 50 MCG tablet TAKE 1 TABLET BY MOUTH EVERY DAY 30 tablet 0   • mupirocin (BACTROBAN) 2 % ointment mupirocin 2 % topical ointment     • omeprazole (priLOSEC) 20 MG capsule Take " "20 mg by mouth Daily.     • sodium-potassium-magnesium sulfates (Suprep Bowel Prep Kit) 17.5-3.13-1.6 GM/177ML solution oral solution Take 2 bottles by mouth Take As Directed. 354 mL 0   • [DISCONTINUED] traZODone (DESYREL) 100 MG tablet Take 0.5-1 tablets by mouth Every Night.       No current facility-administered medications on file prior to visit.       Allergies  No Known Allergies    Objective  Visit Vitals  /70   Pulse 74   Temp 98.1 °F (36.7 °C)   Resp 16   Ht 160 cm (63\")   Wt 81.2 kg (179 lb)   SpO2 97%   Breastfeeding No   BMI 31.71 kg/m²        Physical Exam  Physical Exam  Vitals and nursing note reviewed. Exam conducted with a chaperone present.   Constitutional:       General: She is not in acute distress.     Appearance: She is well-developed. She is obese. She is not ill-appearing or toxic-appearing.   HENT:      Head: Normocephalic and atraumatic.   Eyes:      Conjunctiva/sclera: Conjunctivae normal.   Pulmonary:      Effort: Pulmonary effort is normal. No respiratory distress.   Chest:      Breasts:         Right: Normal.         Left: Normal.   Abdominal:      General: There is no distension.      Palpations: Abdomen is soft.   Genitourinary:     Labia:         Right: No rash, tenderness, lesion or injury.         Left: No rash, tenderness, lesion or injury.       Vagina: Normal.      Cervix: Normal.      Comments: Adnexa not palpable  Lymphadenopathy:      Upper Body:      Right upper body: No supraclavicular, axillary or pectoral adenopathy.      Left upper body: No supraclavicular, axillary or pectoral adenopathy.   Skin:     General: Skin is warm and dry.      Findings: Rash (maculopapular rash on both elbows, no scale or blisters) present.   Neurological:      Mental Status: She is alert and oriented to person, place, and time. Mental status is at baseline.      Gait: Gait normal.         Results  Results for orders placed or performed in visit on 05/10/21   Protein / Creatinine Ratio, " Urine - Urine, Clean Catch    Specimen: Urine, Clean Catch   Result Value Ref Range    Protein/Creatinine Ratio, Urine      Creatinine, Urine 31.8 mg/dL    Total Protein, Urine <4.0 mg/dL   Iron Profile    Specimen: Blood   Result Value Ref Range    Iron 39 37 - 145 mcg/dL    Iron Saturation 6 (L) 20 - 50 %    Transferrin 413 (H) 200 - 360 mg/dL    TIBC 615 (H) 298 - 536 mcg/dL   Ferritin    Specimen: Blood   Result Value Ref Range    Ferritin 13.30 13.00 - 150.00 ng/mL   Vitamin B12    Specimen: Blood   Result Value Ref Range    Vitamin B-12 447 211 - 946 pg/mL   Folate    Specimen: Blood   Result Value Ref Range    Folate 9.55 4.78 - 24.20 ng/mL   Urinalysis without microscopic (no culture) - Urine, Clean Catch    Specimen: Urine, Clean Catch   Result Value Ref Range    Color, UA Yellow Yellow, Straw    Appearance, UA Clear Clear    pH, UA 6.5 5.0 - 8.0    Specific Gravity, UA <=1.005 1.005 - 1.030    Glucose, UA Negative Negative    Ketones, UA Negative Negative    Bilirubin, UA Negative Negative    Blood, UA Negative Negative    Protein, UA Negative Negative    Leuk Esterase, UA Large (3+) (A) Negative    Nitrite, UA Negative Negative    Urobilinogen, UA 0.2 E.U./dL 0.2 - 1.0 E.U./dL   Urinalysis, Microscopic Only - Urine, Clean Catch    Specimen: Urine, Clean Catch   Result Value Ref Range    RBC, UA 0-2 None Seen, 0-2 /HPF    WBC, UA 31-50 (A) None Seen, 0-2 /HPF    Bacteria, UA 2+ (A) None Seen /HPF    Squamous Epithelial Cells, UA 0-2 None Seen, 0-2 /HPF    Hyaline Casts, UA 0-2 None Seen /LPF    Methodology Automated Microscopy         Assessment and Plan  Diagnoses and all orders for this visit:    Well woman exam with routine gynecological exam  - Pap and breast exam today. No concerns.    Iron deficiency anemia, unspecified iron deficiency anemia type  - Hgb 11.2 4/2021. Subsequently found to have iron deficiency. Folate and B12 nl.  - Iron deficiency potentially due to alcohol use, poor nutrition. GI  loss also a consideration.  - She will have colonoscopy next month.  - Continue daily iron supplement  - Check CBC, iron studies today    Stage 3a chronic kidney disease (CMS/HCC)  - Stable with gfr 59.   - Renal US normal, UA without blood or protein  - Etiology uncertain given no HTN, DM, frequent NSAID use  - Will repeat BMP today  - She will establish with nephrology 11/3, appt info provided today.    Acquired hypothyroidism  - TSH normal 2021, continue levothyroxine 50mcg daily    Insomnia due to other mental disorder  - Sleeps well on benadryl and trazodone however notes daytime fatigue, grogginess.  - Discussed good sleep hygiene including avoidance of alcohol. Also discussed that she would be contraindicated from using ambien given daily drinking.  - Will stop trazodone and try elavil 25mg qhs.    Alcohol abuse  - Drinks 6-8 beers most days  - Discussed cutting back and cessation but she feels she is unable to do so on her own.  - Recommend [facility] for counseling and MAT. Contact info provided.    Allergic dermatitis due to other chemical product  - Rash on both elbows clinically appears to be a contact dermatitis, potentially secondary to mineral oil or other vehicle in aquaphor spray  - Triamcinolone BID for up to 14 days.     Health Maintenance  - Pap smear: today  - Mammogram: 2021 BIRADS 1  - Colonoscopy: scheduled 10/2021  - Lung cancer screenin2021, repeat annually  - HCV: negative  - Immunizations: COVID complete. Tdap .  - Depression screening: negative 2021    Return in about 3 months (around 12/3/2021) for Annual, Labs today.  Answers for HPI/ROS submitted by the patient on 2021  Please describe your symptoms.: Due for pap smear  Have you had these symptoms before?: No  How long have you been having these symptoms?: Greater than 2 weeks  What is the primary reason for your visit?: Other

## 2021-09-16 ENCOUNTER — TELEPHONE (OUTPATIENT)
Dept: INTERNAL MEDICINE | Facility: CLINIC | Age: 51
End: 2021-09-16

## 2021-09-16 DIAGNOSIS — R87.610 ASCUS OF CERVIX WITH NEGATIVE HIGH RISK HPV: Primary | ICD-10-CM

## 2021-09-16 NOTE — TELEPHONE ENCOUNTER
----- Message from Muna Huitron MD sent at 9/16/2021  4:52 PM EDT -----  Regarding: Pap results  Please call patient and let her know that her pap smear shows an abnormality call ASCUS (atypical cells of undetermined significance). This is not cervical cancer. Given this abnormality I do recommend she establish with a GYN and have placed a referral.

## 2021-09-17 NOTE — TELEPHONE ENCOUNTER
Thanks for update. If unable to contact by phone after a couple more tries you can try to send a [employer] message.

## 2021-09-21 ENCOUNTER — PATIENT MESSAGE (OUTPATIENT)
Dept: INTERNAL MEDICINE | Facility: CLINIC | Age: 51
End: 2021-09-21

## 2021-09-21 DIAGNOSIS — Z91.89 AT RISK FOR OBSTRUCTIVE SLEEP APNEA: ICD-10-CM

## 2021-09-21 DIAGNOSIS — F51.05 INSOMNIA DUE TO OTHER MENTAL DISORDER: Primary | ICD-10-CM

## 2021-09-21 DIAGNOSIS — F99 INSOMNIA DUE TO OTHER MENTAL DISORDER: Primary | ICD-10-CM

## 2021-09-22 NOTE — TELEPHONE ENCOUNTER
From: Mona Wilkerson  To: Muna Huitron MD  Sent: 9/21/2021 2:18 PM EDT  Subject: Referral Request    Hi Dr. Huitron,    Is it possible to get a sleep study done?    Thanks,  Ramy Wilkerson

## 2021-09-23 ENCOUNTER — TELEPHONE (OUTPATIENT)
Dept: INTERNAL MEDICINE | Facility: CLINIC | Age: 51
End: 2021-09-23

## 2021-09-23 DIAGNOSIS — F41.9 ANXIETY: Primary | ICD-10-CM

## 2021-09-23 DIAGNOSIS — F33.41 RECURRENT MAJOR DEPRESSIVE DISORDER, IN PARTIAL REMISSION (HCC): ICD-10-CM

## 2021-09-23 DIAGNOSIS — E03.9 HYPOTHYROIDISM (ACQUIRED): ICD-10-CM

## 2021-09-23 DIAGNOSIS — K21.9 GERD WITHOUT ESOPHAGITIS: Chronic | ICD-10-CM

## 2021-09-23 RX ORDER — OMEPRAZOLE 20 MG/1
20 CAPSULE, DELAYED RELEASE ORAL DAILY
Qty: 90 CAPSULE | Refills: 0 | Status: SHIPPED | OUTPATIENT
Start: 2021-09-23 | End: 2021-10-05 | Stop reason: SDUPTHER

## 2021-09-23 RX ORDER — FLUOXETINE HYDROCHLORIDE 40 MG/1
40 CAPSULE ORAL DAILY
Qty: 90 CAPSULE | Refills: 0 | Status: SHIPPED | OUTPATIENT
Start: 2021-09-23 | End: 2021-12-14

## 2021-09-23 RX ORDER — LEVOTHYROXINE SODIUM 0.05 MG/1
50 TABLET ORAL DAILY
Qty: 90 TABLET | Refills: 0 | Status: SHIPPED | OUTPATIENT
Start: 2021-09-23 | End: 2021-12-14

## 2021-09-23 NOTE — TELEPHONE ENCOUNTER
Last Office Visit: 9/3/21  Next Office Visit: None scheduled    Labs completed in past 6 months? yes  Labs completed in past year? no    Last Refill Date: Fluoxetine FRANCES Historical Provider            Omeprazole FRANCES, Historical Provider             Levothryoxine 1/14/2017      Quantity:N  Refills:FRANCES    Pharmacy: [id number] [address]   [address]# [phone]   FAX# [phone]    Please advise

## 2021-09-28 ENCOUNTER — TELEPHONE (OUTPATIENT)
Dept: INTERNAL MEDICINE | Facility: CLINIC | Age: 51
End: 2021-09-28

## 2021-09-28 DIAGNOSIS — F51.05 INSOMNIA DUE TO OTHER MENTAL DISORDER: Primary | ICD-10-CM

## 2021-09-28 DIAGNOSIS — K21.9 GERD WITHOUT ESOPHAGITIS: Chronic | ICD-10-CM

## 2021-09-28 DIAGNOSIS — F99 INSOMNIA DUE TO OTHER MENTAL DISORDER: Primary | ICD-10-CM

## 2021-10-05 RX ORDER — TRAZODONE HYDROCHLORIDE 100 MG/1
100 TABLET ORAL NIGHTLY
Qty: 90 TABLET | Refills: 1 | Status: SHIPPED | OUTPATIENT
Start: 2021-10-05 | End: 2022-05-20

## 2021-10-05 RX ORDER — OMEPRAZOLE 40 MG/1
40 CAPSULE, DELAYED RELEASE ORAL DAILY
Qty: 90 CAPSULE | Refills: 1 | Status: SHIPPED | OUTPATIENT
Start: 2021-10-05 | End: 2021-10-07 | Stop reason: SDUPTHER

## 2021-10-05 NOTE — TELEPHONE ENCOUNTER
Trazodone refilled. Before referring to GI for EGD I would recommend increasing omeprazole to 40mg daily 30 minutes before breakfast. I have sent this also to her pharmacy.

## 2021-10-05 NOTE — TELEPHONE ENCOUNTER
Spoke to Pt and she stated that she is taking the Trazodone 100 mg Pt stated that she is not taking the amitriptyline because of side effects.  Pt also asked to be referred for an EGD because of reflux. Please advise

## 2021-10-07 ENCOUNTER — PATIENT MESSAGE (OUTPATIENT)
Dept: INTERNAL MEDICINE | Facility: CLINIC | Age: 51
End: 2021-10-07

## 2021-10-07 DIAGNOSIS — K21.9 GERD WITHOUT ESOPHAGITIS: Chronic | ICD-10-CM

## 2021-10-07 RX ORDER — OMEPRAZOLE 20 MG/1
20 CAPSULE, DELAYED RELEASE ORAL DAILY
Qty: 90 CAPSULE | Refills: 1 | Status: SHIPPED | OUTPATIENT
Start: 2021-10-07 | End: 2022-04-22 | Stop reason: SDUPTHER

## 2021-10-07 NOTE — TELEPHONE ENCOUNTER
Left message for patient regarding medication recommendations.  Advised to call back if necessary.

## 2021-10-07 NOTE — TELEPHONE ENCOUNTER
Did you cancel the omeprazole 40mg daily with the pharmacy? Patient will remain on omeprazole 20mg daily.

## 2021-10-07 NOTE — TELEPHONE ENCOUNTER
From: Mona Wilkerson  To: Muna Huitron MD  Sent: 10/7/2021 1:12 PM EDT  Subject: Non-Urgent Medical Question    Hi Dr. Huitron,    I wasn't clear about reason for EGD. My reflux is totally under control and I don't need an increase in omeprazole. Sorry to have bothered you with this. I will see Dr. Nice for an EGD. I was trying to save time and didn't think you care to refer me.    Have a great day and see you in near future.    Warmly,    Mona Wilkerson

## 2021-10-07 NOTE — TELEPHONE ENCOUNTER
I have submitted a new prescription for omeprazole 20mg daily. Please call her pharmacy and cancel previously sent script for 40mg. Thank you.

## 2021-10-14 RX ORDER — SOD SULF/POT CHLORIDE/MAG SULF 1.479 G
12 TABLET ORAL 2 TIMES DAILY
Qty: 24 TABLET | Refills: 0 | Status: SHIPPED | OUTPATIENT
Start: 2021-10-14 | End: 2021-11-01

## 2021-10-29 ENCOUNTER — OUTSIDE FACILITY SERVICE (OUTPATIENT)
Dept: GASTROENTEROLOGY | Facility: CLINIC | Age: 51
End: 2021-10-29

## 2021-10-29 PROCEDURE — 88305 TISSUE EXAM BY PATHOLOGIST: CPT | Performed by: INTERNAL MEDICINE

## 2021-10-29 PROCEDURE — 45385 COLONOSCOPY W/LESION REMOVAL: CPT | Performed by: INTERNAL MEDICINE

## 2021-11-01 ENCOUNTER — LAB REQUISITION (OUTPATIENT)
Dept: LAB | Facility: HOSPITAL | Age: 51
End: 2021-11-01

## 2021-11-01 ENCOUNTER — OFFICE VISIT (OUTPATIENT)
Dept: OBSTETRICS AND GYNECOLOGY | Facility: CLINIC | Age: 51
End: 2021-11-01

## 2021-11-01 VITALS
BODY MASS INDEX: 30.97 KG/M2 | SYSTOLIC BLOOD PRESSURE: 130 MMHG | DIASTOLIC BLOOD PRESSURE: 76 MMHG | HEIGHT: 63 IN | WEIGHT: 174.8 LBS

## 2021-11-01 DIAGNOSIS — Z12.11 ENCOUNTER FOR SCREENING FOR MALIGNANT NEOPLASM OF COLON: ICD-10-CM

## 2021-11-01 DIAGNOSIS — N76.0 ACUTE VAGINITIS: ICD-10-CM

## 2021-11-01 DIAGNOSIS — K63.5 POLYP OF COLON: ICD-10-CM

## 2021-11-01 DIAGNOSIS — N95.2 ATROPHY OF VAGINA: ICD-10-CM

## 2021-11-01 DIAGNOSIS — R87.619 ABNORMAL CERVICAL PAPANICOLAOU SMEAR, UNSPECIFIED ABNORMAL PAP FINDING: Primary | ICD-10-CM

## 2021-11-01 DIAGNOSIS — Z83.71 FAMILY HISTORY OF COLONIC POLYPS: ICD-10-CM

## 2021-11-01 DIAGNOSIS — K57.30 DIVERTICULOSIS OF LARGE INTESTINE WITHOUT PERFORATION OR ABSCESS WITHOUT BLEEDING: ICD-10-CM

## 2021-11-01 PROCEDURE — 99203 OFFICE O/P NEW LOW 30 MIN: CPT | Performed by: NURSE PRACTITIONER

## 2021-11-01 RX ORDER — BIOTIN 1 MG
1000 TABLET ORAL DAILY
COMMUNITY
End: 2022-09-06

## 2021-11-01 RX ORDER — ESTRADIOL 10 UG/1
1 INSERT VAGINAL 2 TIMES WEEKLY
Qty: 24 TABLET | Refills: 3 | Status: SHIPPED | OUTPATIENT
Start: 2021-11-01 | End: 2022-09-06 | Stop reason: SDUPTHER

## 2021-11-01 RX ORDER — CETIRIZINE HYDROCHLORIDE 10 MG/1
10 TABLET ORAL DAILY
COMMUNITY
End: 2023-02-23

## 2021-11-01 RX ORDER — MELATONIN
5000 DAILY
COMMUNITY
End: 2023-02-23

## 2021-11-01 NOTE — PROGRESS NOTES
Chief Complaint  Mona Wilkerson is a 51 y.o.  female presenting for Gynecologic Exam (referred by PCP.  pap 9/3/21 ASC, HR HPV neg.)    History of Present Illness  Referred for eval of ASCUS pap (Neg for HR HPV)  This was the FIRST year of having an atypical pap.  She has no PMHx of HR HPV & is not currently SA for long while.  No vaginitis sx.    She was Tx for a UTI few mo ago.  No dysuria now.  She verb. Understanding that atrophy & infections can cause inflammation and sometimes an abn pap.  We will ck for those problems today.  She also verb. Understanding that if 2 ASCUS paps in a row, would need colpo.    The following portions of the patient's history were reviewed and updated as appropriate: allergies, current medications, past family history, past medical history, past social history, past surgical history and problem list.    No Known Allergies      Current Outpatient Medications:   •  Biotin 1000 MCG tablet, Take 1,000 mcg by mouth Daily., Disp: , Rfl:   •  cetirizine (zyrTEC) 10 MG tablet, Take 10 mg by mouth Daily., Disp: , Rfl:   •  cholecalciferol (Vitamin D, Cholecalciferol,) 25 MCG (1000 UT) tablet, Take 5,000 Units by mouth Daily., Disp: , Rfl:   •  estradiol (Vagifem) 10 MCG tablet vaginal tablet, Insert 1 tablet into the vagina 2 (Two) Times a Week., Disp: 24 tablet, Rfl: 3  •  FLUoxetine (PROzac) 40 MG capsule, Take 1 capsule by mouth Daily., Disp: 90 capsule, Rfl: 0  •  levothyroxine (SYNTHROID, LEVOTHROID) 50 MCG tablet, Take 1 tablet by mouth Daily., Disp: 90 tablet, Rfl: 0  •  omeprazole (priLOSEC) 20 MG capsule, Take 1 capsule by mouth Daily., Disp: 90 capsule, Rfl: 1  •  traZODone (DESYREL) 100 MG tablet, Take 1 tablet by mouth Every Night., Disp: 90 tablet, Rfl: 1    Past Medical History:   Diagnosis Date   • Cyst of ovary 2016   • Depression    • Depression with anxiety 2016    seeing [facility]- doing well  off lithium  changed to lamictal getting adhd testing   • GERD  "(gastroesophageal reflux disease)    • Hyperlipidemia 11/4/2016 26 Jan 2016  reviewed lipid panel with her    • Hypothyroidism    • Primary insomnia 11/21/2016   • Sleep difficulties         Past Surgical History:   Procedure Laterality Date   • ENDOMETRIAL ABLATION W/ NOVASURE     • ESSURE TUBAL LIGATION     • EYE SURGERY  2002    Lasik   • LASIK Bilateral    • LIPOSUCTION     • RHINOPLASTY         Objective  /76   Ht 160 cm (63\")   Wt 79.3 kg (174 lb 12.8 oz)   Breastfeeding No   BMI 30.96 kg/m²     Physical Exam  Exam conducted with a chaperone present.   Constitutional:       Appearance: Normal appearance.   Abdominal:      Palpations: Abdomen is soft. There is no mass.      Tenderness: There is no abdominal tenderness.   Genitourinary:     General: Normal vulva.      Vagina: Vaginal discharge and erythema present.      Cervix: No cervical motion tenderness, discharge, lesion or erythema.      Uterus: Normal. Not enlarged and not tender.       Adnexa: Right adnexa normal and left adnexa normal.        Right: No mass or tenderness.          Left: No mass or tenderness.        Rectum: Normal.      Comments: Minimal erythema at introitus.  No lesions.  Minimal erythema & atrophy noted.  Sm amt sl thick white dc noted.  Culture taken.  Anus appears wnl.  (No rectal exam performed.)        Assessment/Plan   Diagnoses and all orders for this visit:    1. Abnormal cervical Papanicolaou smear, unspecified abnormal pap finding (Primary)    2. Acute vaginitis  -     Aerobic Vaginitis (AV) Panel - Swab, Cervix; Future  -     Candida panel, PCR - Swab, Vagina; Future    3. Atrophy of vagina  -     estradiol (Vagifem) 10 MCG tablet vaginal tablet; Insert 1 tablet into the vagina 2 (Two) Times a Week.  Dispense: 24 tablet; Refill: 3        Procedures        Return in about 3 months (around 2/1/2022), or 3 mo FU atrophy.    Jen Ramos, APRN  11/01/2021  "

## 2021-11-02 LAB
CYTO UR: NORMAL
LAB AP CASE REPORT: NORMAL
LAB AP CLINICAL INFORMATION: NORMAL
PATH REPORT.FINAL DX SPEC: NORMAL
PATH REPORT.GROSS SPEC: NORMAL

## 2021-12-13 DIAGNOSIS — F33.41 RECURRENT MAJOR DEPRESSIVE DISORDER, IN PARTIAL REMISSION (HCC): ICD-10-CM

## 2021-12-13 DIAGNOSIS — E03.9 HYPOTHYROIDISM (ACQUIRED): ICD-10-CM

## 2021-12-13 DIAGNOSIS — F41.9 ANXIETY: ICD-10-CM

## 2021-12-14 ENCOUNTER — TELEPHONE (OUTPATIENT)
Dept: INTERNAL MEDICINE | Facility: CLINIC | Age: 51
End: 2021-12-14

## 2021-12-14 DIAGNOSIS — F33.41 RECURRENT MAJOR DEPRESSIVE DISORDER, IN PARTIAL REMISSION: ICD-10-CM

## 2021-12-14 DIAGNOSIS — F41.9 ANXIETY: ICD-10-CM

## 2021-12-14 DIAGNOSIS — E03.9 HYPOTHYROIDISM (ACQUIRED): ICD-10-CM

## 2021-12-14 RX ORDER — FLUOXETINE HYDROCHLORIDE 40 MG/1
CAPSULE ORAL
Qty: 45 CAPSULE | Refills: 0 | Status: SHIPPED | OUTPATIENT
Start: 2021-12-14 | End: 2022-04-22 | Stop reason: SDUPTHER

## 2021-12-14 RX ORDER — LEVOTHYROXINE SODIUM 0.05 MG/1
TABLET ORAL
Qty: 45 TABLET | Refills: 0 | Status: SHIPPED | OUTPATIENT
Start: 2021-12-14 | End: 2022-04-22 | Stop reason: SDUPTHER

## 2021-12-14 NOTE — TELEPHONE ENCOUNTER
Last Office Visit: 9/3/21  Next Office Visit:none, was to have annual around 12/3/21    Labs completed in past 6 months? yes  Labs completed in past year? yes    Last Refill Date:9/23/21  Quantity:90  Refills:0    Pharmacy:     Please review pended refill request for any changes needed on refills or quantities. Thank you!

## 2021-12-14 NOTE — TELEPHONE ENCOUNTER
Please call pharmacy and give verbal order to update both scripts to 90 day supply. No refills. She needs to be seen for physical.

## 2021-12-14 NOTE — TELEPHONE ENCOUNTER
Spoke to Jose at [facility] pharmacy and gave verbal order for 90 day supply. No refills until Pt is seen

## 2021-12-14 NOTE — TELEPHONE ENCOUNTER
Pharmacy Name: [id number] [place] [facility] [address] - [phone] [facility] [phone] [place]  [id number] - [place], [place] - [address] - [phone] [facility] [phone] [facility] - [place]     Pharmacy representative name: KEVIN    Pharmacy representative phone number: [phone]    What medication are you calling in regards to:    FLUoxetine (PROzac) 40 MG capsule   levothyroxine (SYNTHROID, LEVOTHROID) 50 MCG tablet      What question does the pharmacy have: KEVIN WITH [facility] IS REQUESTING THESE TWO PRESCRIPTIONS THAT THEY RECEIVED TODAY, 12/14/2021 TO BE UPDATED TO A 90 DAY SUPPLY WITH REFILLS.     Who is the provider that prescribed the medication: KEVIN PERSAUD  Additional notes: PLEASE CALL IF ANY QUESTIONS -[phone].

## 2022-01-14 ENCOUNTER — OFFICE VISIT (OUTPATIENT)
Dept: GASTROENTEROLOGY | Facility: CLINIC | Age: 52
End: 2022-01-14

## 2022-01-14 VITALS
HEIGHT: 63 IN | SYSTOLIC BLOOD PRESSURE: 131 MMHG | TEMPERATURE: 98.2 F | BODY MASS INDEX: 31.25 KG/M2 | WEIGHT: 176.4 LBS | HEART RATE: 85 BPM | DIASTOLIC BLOOD PRESSURE: 81 MMHG

## 2022-01-14 DIAGNOSIS — Z78.9 ALCOHOL USE: ICD-10-CM

## 2022-01-14 DIAGNOSIS — K57.90 DIVERTICULOSIS: ICD-10-CM

## 2022-01-14 DIAGNOSIS — R19.5 LOOSE STOOLS: ICD-10-CM

## 2022-01-14 DIAGNOSIS — R12 HEARTBURN: ICD-10-CM

## 2022-01-14 DIAGNOSIS — R11.2 NAUSEA AND VOMITING, INTRACTABILITY OF VOMITING NOT SPECIFIED, UNSPECIFIED VOMITING TYPE: Primary | ICD-10-CM

## 2022-01-14 DIAGNOSIS — Z86.010 HISTORY OF COLONIC POLYPS: ICD-10-CM

## 2022-01-14 PROBLEM — Z86.0100 HISTORY OF COLONIC POLYPS: Status: ACTIVE | Noted: 2022-01-14

## 2022-01-14 PROCEDURE — 99214 OFFICE O/P EST MOD 30 MIN: CPT | Performed by: NURSE PRACTITIONER

## 2022-01-14 NOTE — PROGRESS NOTES
GASTROENTEROLOGY OFFICE NOTE    Mona Wilkerson  [id number]  1970    CARE TEAM  Patient Care Team:  Muna Huitron MD as PCP - General (Internal Medicine)    Referring Provider: No ref. 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  She reports usually has 1 bowel movement daily described as loose.  She reports she has had a sensitive GI system for most of her life.  She recalls prior EGD in [place] in 2009 that was performed due to reflux and revealed possible Forrest's but she feels as though she was not diagnosed with Forrest's.  She takes omeprazole 20 mg daily but is interested in eventually stopping omeprazole.  She has previously discontinued use of omeprazole but restarted it.  She reports at least weekly, she has episodes of nausea, vomiting and bloating.  She mostly wants to undergo further evaluation for possible celiac.  She also reports she drinks 6 beers per day approximately 4 days/week.  She is interested in discontinuing use of alcohol.  She reports she has experienced difficulty sleeping and alcohol helps her sleep for a few hours. She also feels as though she drinks due to boredom.  She reports she has previously attended  meetings and had evaluation and treatment at the [facility] in the past.  She reports she previously lived in [place] but after the death of her sister she moved to [place].  10/29/2021 colonoscopy per Dr. Nice that revealed 1 4 mm polyp in the transverse colon resected and retrieved and diverticulosis in the sigmoid colon with recommendation to repeat colonoscopy in 5 years for surveillance.  Review of pathology report revealed transverse colon polyp that was tubular adenoma  Past Medical History:    Diagnosis Date   • Cyst of ovary 11/21/2016   • Depression    • Depression with anxiety 11/4/2016    seeing [facility]- doing well  off lithium  changed to lamictal getting adhd testing   • GERD (gastroesophageal reflux disease)    • Hyperlipidemia 11/4/2016 26 Jan 2016  reviewed lipid panel with her    • Hypothyroidism    • Primary insomnia 11/21/2016   • Sleep difficulties         Past Surgical History:   Procedure Laterality Date   • COLONOSCOPY     • COLONOSCOPY W/ POLYPECTOMY  10/29/2021    Dr. Nice, diverticulosis and removal of tubular adenoma   • ENDOMETRIAL ABLATION W/ NOVASURE     • ESSURE TUBAL LIGATION     • EYE SURGERY  2002    Lasik   • LASIK Bilateral    • LIPOSUCTION     • RHINOPLASTY          Current Outpatient Medications on File Prior to Visit   Medication Sig   • Biotin 1000 MCG tablet Take 1,000 mcg by mouth Daily.   • cetirizine (zyrTEC) 10 MG tablet Take 10 mg by mouth Daily.   • cholecalciferol (Vitamin D, Cholecalciferol,) 25 MCG (1000 UT) tablet Take 5,000 Units by mouth Daily.   • estradiol (Vagifem) 10 MCG tablet vaginal tablet Insert 1 tablet into the vagina 2 (Two) Times a Week.   • FLUoxetine (PROzac) 40 MG capsule TAKE ONE CAPSULE BY MOUTH ONE TIME DAILY   • levothyroxine (SYNTHROID, LEVOTHROID) 50 MCG tablet TAKE ONE TABLET BY MOUTH ONE TIME DAILY   • omeprazole (priLOSEC) 20 MG capsule Take 1 capsule by mouth Daily.   • traZODone (DESYREL) 100 MG tablet Take 1 tablet by mouth Every Night.     No current facility-administered medications on file prior to visit.       No Known Allergies    Family History   Problem Relation Age of Onset   • Thyroid disease Mother    • Breast cancer Mother 53   • Hypertension Mother         Essential HTN   • Hyperlipidemia Mother    • Myelodysplastic syndrome Mother    • Cancer Mother         Breast CA, lumpectomy w/ nodes   • Alcohol abuse Sister    • Hypertension Sister         Essential HTN   • Hyperlipidemia Sister    • Migraines Sister          "Migraine headaches   • Diabetes Paternal Aunt    • Hypertension Father         Essential HTN   • Hyperlipidemia Father    • Myelodysplastic syndrome Father    • Breast cancer Maternal Grandmother 74   • Arthritis Sister         RA versus psoriatic arthritis   • Hypertension Sister    • Hypothyroidism Sister    • Hyperlipidemia Sister    • Thyroid disease Sister    • Ovarian cancer Neg Hx    • Colon cancer Neg Hx    • Liver cancer Neg Hx    • Rectal cancer Neg Hx    • Stomach cancer Neg Hx        Social History     Socioeconomic History   • Marital status:    Tobacco Use   • Smoking status: Former Smoker     Packs/day: 0.75     Years: 30.00     Pack years: 22.50     Types: Cigarettes   • Smokeless tobacco: Never Used   Vaping Use   • Vaping Use: Never used   Substance and Sexual Activity   • Alcohol use: Yes     Alcohol/week: 30.0 standard drinks     Types: 30 Cans of beer per week     Comment: 6 pack per day, 5 days per week. Starting 2017. Went to rehab 2019.   • Drug use: No   • Sexual activity: Not Currently     Partners: Male     Birth control/protection: Tubal ligation       PHYSICAL EXAM   /81 (BP Location: Left arm, Patient Position: Sitting, Cuff Size: Adult)   Pulse 85   Temp 98.2 °F (36.8 °C) (Temporal)   Ht 160 cm (63\")   Wt 80 kg (176 lb 6.4 oz)   BMI 31.25 kg/m²   Physical Exam  Constitutional:       General: She is not in acute distress.     Appearance: She is not toxic-appearing.   HENT:      Head: Normocephalic and atraumatic. No contusion.      Right Ear: External ear normal.      Left Ear: External ear normal.   Eyes:      General: Lids are normal. No scleral icterus.        Right eye: No discharge.         Left eye: No discharge.      Extraocular Movements: Extraocular movements intact.   Neck:      Trachea: Trachea normal.      Comments: No visible mass  No visible adenopathy  Cardiovascular:      Rate and Rhythm: Normal rate.   Pulmonary:      Effort: No respiratory distress. "      Comments: Symmetrical expansion    Musculoskeletal:      Comments: Symmetrical movement of upper extremities  Symmetrical movement of lower extremities  No visible deformities   Skin:     General: Skin is warm and dry.      Coloration: Skin is not jaundiced.   Neurological:      General: No focal deficit present.      Mental Status: She is alert and oriented to person, place, and time.   Psychiatric:         Mood and Affect: Mood normal.         Behavior: Behavior normal.         Thought Content: Thought content normal.     Results Review:  10/29/2021 colonoscopy per Dr. Nice that revealed 1 4 mm polyp in the transverse colon resected and retrieved and diverticulosis in the sigmoid colon with recommendation to repeat colonoscopy in 5 years for surveillance.  Review of pathology report revealed transverse colon polyp that was tubular adenoma    CMP    CMP 4/30/21 9/3/21   Glucose 88 91   BUN 11 14   Creatinine 0.99 0.86   eGFR Non African Am 59 (A) 69   Sodium 138 136   Potassium 4.0 4.2   Chloride 99 99   Calcium 9.5 9.7   Albumin 4.70    Total Bilirubin 0.2    Alkaline Phosphatase 117    AST (SGOT) 23    ALT (SGPT) 18    (A) Abnormal value            ASSESSMENT / PLAN  1. Nausea and vomiting, intractability of vomiting not specified, unspecified vomiting type  -We will send for celiac comprehensive panel and plan for EGD for further evaluation.  At this time, I recommend she continue daily proton pump inhibitor in the event acid reflux is contributing to nausea and vomiting.  We may consider discontinuing daily proton pump inhibitor if no evidence of reflux on upcoming EGD but I would like for her to continue daily proton pump inhibitor until EGD is complete.  I also discussed that decreasing with eventual goal of stopping alcohol may be helpful for what could be acid reflux aggravated by alcohol intake.  She demonstrated understanding.  Please consider biopsies of the duodenum to also evaluate for celiac.   She may have had prior biopsies during EGD in 2009.  She mentioned she may try to review prior records.  - Celiac Comprehensive Panel    2. Heartburn  -continue PPI daily for now and will consider trial off of PPI in the future pending EGD result.     3. History of colonic polyps  -due for surveillance colonoscopy 10/2026    4. Diverticulosis  - plan to discuss dietary fiber intake of 30 grams per day at follow up and consideration for daily dose of Metamucil at follow up visit.     5. Loose stool:  -Currently reports 1 loose stool per day.  Evaluate for celiac and try to decrease alcohol with goal of alcohol cessation to determine if loose stools improved.  Plan to discuss fiber intake at follow-up visit.    I recommend she consider establishing with a counselor to assist with coping mechanisms to work on healthy thought processes and interventions that may help her avoid alcohol going forward.    Return for Follow-up after EGD.    Nelly Tejada, APRN  01/14/2022

## 2022-01-17 ENCOUNTER — LAB (OUTPATIENT)
Dept: LAB | Facility: HOSPITAL | Age: 52
End: 2022-01-17

## 2022-01-17 PROCEDURE — 82784 ASSAY IGA/IGD/IGG/IGM EACH: CPT | Performed by: NURSE PRACTITIONER

## 2022-01-17 PROCEDURE — 86258 DGP ANTIBODY EACH IG CLASS: CPT | Performed by: NURSE PRACTITIONER

## 2022-01-17 PROCEDURE — 86364 TISS TRNSGLTMNASE EA IG CLAS: CPT | Performed by: NURSE PRACTITIONER

## 2022-01-17 PROCEDURE — 36415 COLL VENOUS BLD VENIPUNCTURE: CPT | Performed by: NURSE PRACTITIONER

## 2022-01-17 PROCEDURE — 86231 EMA EACH IG CLASS: CPT | Performed by: NURSE PRACTITIONER

## 2022-01-18 LAB
ENDOMYSIUM IGA SER QL: NEGATIVE
GLIADIN PEPTIDE IGA SER-ACNC: 6 UNITS (ref 0–19)
GLIADIN PEPTIDE IGG SER-ACNC: 4 UNITS (ref 0–19)
IGA SERPL-MCNC: 214 MG/DL (ref 87–352)
TTG IGA SER-ACNC: <2 U/ML (ref 0–3)
TTG IGG SER-ACNC: <2 U/ML (ref 0–5)

## 2022-01-19 NOTE — PROGRESS NOTES
My chart message to patient: Celiac comprehensive panel was normal/negative.   Plan to proceed with EGD as scheduled.   Try to consume 30 grams of dietary fiber per day and take a daily fiber supplement such as Metamucil powder mixed in 8 oz of any liquid once daily  from other medications by 2 hours on both sides. This may help bulk/form stool. And with history of diverticulosis, high fiber diet may help decrease episode of diverticulitis.

## 2022-01-28 ENCOUNTER — HOSPITAL ENCOUNTER (EMERGENCY)
Facility: HOSPITAL | Age: 52
Discharge: HOME | End: 2022-01-28
Attending: EMERGENCY MEDICINE | Admitting: EMERGENCY MEDICINE

## 2022-01-28 ENCOUNTER — APPOINTMENT (OUTPATIENT)
Dept: CT IMAGING | Facility: HOSPITAL | Age: 52
End: 2022-01-28

## 2022-01-28 VITALS
DIASTOLIC BLOOD PRESSURE: 106 MMHG | WEIGHT: 170 LBS | HEIGHT: 63 IN | OXYGEN SATURATION: 97 % | SYSTOLIC BLOOD PRESSURE: 161 MMHG | BODY MASS INDEX: 30.12 KG/M2 | TEMPERATURE: 97.7 F | HEART RATE: 70 BPM | RESPIRATION RATE: 18 BRPM

## 2022-01-28 DIAGNOSIS — M50.30 DEGENERATIVE DISC DISEASE, CERVICAL: ICD-10-CM

## 2022-01-28 DIAGNOSIS — S09.90XA ACUTE HEAD INJURY, INITIAL ENCOUNTER: Primary | ICD-10-CM

## 2022-01-28 DIAGNOSIS — S16.1XXA ACUTE CERVICAL MYOFASCIAL STRAIN, INITIAL ENCOUNTER: ICD-10-CM

## 2022-01-28 DIAGNOSIS — M54.12 CERVICAL RADICULOPATHY AT C6: ICD-10-CM

## 2022-01-28 PROCEDURE — 99283 EMERGENCY DEPT VISIT LOW MDM: CPT

## 2022-01-28 PROCEDURE — 70450 CT HEAD/BRAIN W/O DYE: CPT

## 2022-01-28 PROCEDURE — 72125 CT NECK SPINE W/O DYE: CPT

## 2022-01-28 RX ORDER — IBUPROFEN 600 MG/1
600 TABLET ORAL EVERY 6 HOURS PRN
Qty: 40 TABLET | Refills: 0 | Status: SHIPPED | OUTPATIENT
Start: 2022-01-28 | End: 2022-04-22

## 2022-01-28 RX ORDER — CYCLOBENZAPRINE HCL 10 MG
10 TABLET ORAL 3 TIMES DAILY PRN
Qty: 30 TABLET | Refills: 0 | Status: SHIPPED | OUTPATIENT
Start: 2022-01-28 | End: 2022-01-28 | Stop reason: SDUPTHER

## 2022-01-28 RX ORDER — PREDNISONE 20 MG/1
20 TABLET ORAL 2 TIMES DAILY
Qty: 6 TABLET | Refills: 0 | Status: SHIPPED | OUTPATIENT
Start: 2022-01-28 | End: 2022-01-31

## 2022-01-28 RX ORDER — CYCLOBENZAPRINE HCL 10 MG
10 TABLET ORAL 3 TIMES DAILY PRN
Qty: 30 TABLET | Refills: 0 | Status: SHIPPED | OUTPATIENT
Start: 2022-01-28 | End: 2022-04-22 | Stop reason: SDUPTHER

## 2022-01-28 RX ORDER — IBUPROFEN 600 MG/1
600 TABLET ORAL EVERY 6 HOURS PRN
Qty: 40 TABLET | Refills: 0 | Status: SHIPPED | OUTPATIENT
Start: 2022-01-28 | End: 2022-01-28 | Stop reason: SDUPTHER

## 2022-01-28 RX ORDER — PREDNISONE 20 MG/1
20 TABLET ORAL 2 TIMES DAILY
Qty: 6 TABLET | Refills: 0 | Status: SHIPPED | OUTPATIENT
Start: 2022-01-28 | End: 2022-01-28 | Stop reason: SDUPTHER

## 2022-03-01 DIAGNOSIS — F33.41 RECURRENT MAJOR DEPRESSIVE DISORDER, IN PARTIAL REMISSION: ICD-10-CM

## 2022-03-01 DIAGNOSIS — E03.9 HYPOTHYROIDISM (ACQUIRED): ICD-10-CM

## 2022-03-01 DIAGNOSIS — F41.9 ANXIETY: ICD-10-CM

## 2022-03-01 RX ORDER — LEVOTHYROXINE SODIUM 0.05 MG/1
TABLET ORAL
Qty: 90 TABLET | Refills: 0 | OUTPATIENT
Start: 2022-03-01

## 2022-03-01 RX ORDER — FLUOXETINE HYDROCHLORIDE 40 MG/1
CAPSULE ORAL
Qty: 90 CAPSULE | Refills: 0 | OUTPATIENT
Start: 2022-03-01

## 2022-04-08 ENCOUNTER — OFFICE VISIT (OUTPATIENT)
Dept: NEUROSURGERY | Facility: CLINIC | Age: 52
End: 2022-04-08

## 2022-04-08 VITALS
BODY MASS INDEX: 30.58 KG/M2 | SYSTOLIC BLOOD PRESSURE: 131 MMHG | HEART RATE: 76 BPM | RESPIRATION RATE: 18 BRPM | WEIGHT: 172.6 LBS | HEIGHT: 63 IN | DIASTOLIC BLOOD PRESSURE: 94 MMHG

## 2022-04-08 DIAGNOSIS — N18.31 STAGE 3A CHRONIC KIDNEY DISEASE: Primary | ICD-10-CM

## 2022-04-08 DIAGNOSIS — M50.30 DDD (DEGENERATIVE DISC DISEASE), CERVICAL: ICD-10-CM

## 2022-04-08 DIAGNOSIS — E66.09 CLASS 1 OBESITY DUE TO EXCESS CALORIES WITH SERIOUS COMORBIDITY AND BODY MASS INDEX (BMI) OF 30.0 TO 30.9 IN ADULT: ICD-10-CM

## 2022-04-08 PROBLEM — E66.811 CLASS 1 OBESITY DUE TO EXCESS CALORIES WITH SERIOUS COMORBIDITY AND BODY MASS INDEX (BMI) OF 30.0 TO 30.9 IN ADULT: Status: ACTIVE | Noted: 2022-04-08

## 2022-04-08 PROCEDURE — 99204 OFFICE O/P NEW MOD 45 MIN: CPT | Performed by: NEUROLOGICAL SURGERY

## 2022-04-08 NOTE — PROGRESS NOTES
Subjective     Chief Complaint: Neck and shoulder pain    Patient ID: Mona Wilkerson is a 52 y.o. female seen for consultation today at the request of  Muna Huitron MD    History of Present Illness    This is a 52-year-old woman who fell several weeks ago.  She was evaluated with a CT scan of the cervical spine which demonstrated some chronic degenerative changes.  Referral to my clinic was accordingly established.  She denies any neck pain, shoulder pain, or arm pain, other than the very mild chronic neck and shoulder pain which she has been suffering with for many years.  The symptoms are intermittent and not particularly concerning for her.  She similarly denies any numbness, tingling, or weakness in her arms or hands.  She has occasional paresthesias of the right iliotibial band.  This is intermittent and has not bothered her recently.  There is no temporal relationship between this numbness and her trauma.    The following portions of the patient's history were reviewed and updated as appropriate: allergies, current medications, past family history, past medical history, past social history, past surgical history and problem list.    Family history:   Family History   Problem Relation Age of Onset   • Thyroid disease Mother    • Breast cancer Mother 53   • Hypertension Mother         Essential HTN   • Hyperlipidemia Mother    • Myelodysplastic syndrome Mother    • Cancer Mother         Breast CA, lumpectomy w/ nodes   • Alcohol abuse Sister    • Hypertension Sister         Essential HTN   • Hyperlipidemia Sister    • Migraines Sister         Migraine headaches   • Diabetes Paternal Aunt    • Hypertension Father         Essential HTN   • Hyperlipidemia Father    • Myelodysplastic syndrome Father    • Breast cancer Maternal Grandmother 74   • Arthritis Sister         RA versus psoriatic arthritis   • Hypertension Sister    • Hypothyroidism Sister    • Hyperlipidemia Sister    • Thyroid disease Sister     • Ovarian cancer Neg Hx    • Colon cancer Neg Hx    • Liver cancer Neg Hx    • Rectal cancer Neg Hx    • Stomach cancer Neg Hx        Social history:   Social History     Socioeconomic History   • Marital status:    Tobacco Use   • Smoking status: Former Smoker     Packs/day: 0.75     Years: 30.00     Pack years: 22.50     Types: Cigarettes   • Smokeless tobacco: Never Used   Vaping Use   • Vaping Use: Never used   Substance and Sexual Activity   • Alcohol use: Yes     Alcohol/week: 30.0 standard drinks     Types: 30 Cans of beer per week     Comment: 6 pack per day, 5 days per week. Starting 2017. Went to rehab 2019.   • Drug use: No   • Sexual activity: Not Currently     Partners: Male     Birth control/protection: Tubal ligation       Review of Systems   Constitutional: Negative for activity change, appetite change, chills, diaphoresis, fatigue, fever and unexpected weight change.   HENT: Negative for congestion, dental problem, drooling, ear discharge, ear pain, facial swelling, hearing loss, mouth sores, nosebleeds, postnasal drip, rhinorrhea, sinus pressure, sinus pain, sneezing, sore throat, tinnitus, trouble swallowing and voice change.    Eyes: Negative for photophobia, pain, discharge, redness, itching and visual disturbance.   Respiratory: Negative for apnea, cough, choking, chest tightness, shortness of breath, wheezing and stridor.    Cardiovascular: Negative for chest pain, palpitations and leg swelling.   Gastrointestinal: Negative for abdominal distention, abdominal pain, anal bleeding, blood in stool, constipation, diarrhea, nausea, rectal pain and vomiting.   Endocrine: Negative for cold intolerance, heat intolerance, polydipsia, polyphagia and polyuria.   Genitourinary: Positive for frequency and urgency. Negative for decreased urine volume, difficulty urinating, dysuria, enuresis, flank pain, genital sores and hematuria.   Musculoskeletal: Positive for back pain and neck pain. Negative  "for arthralgias, gait problem, joint swelling, myalgias and neck stiffness.   Skin: Negative for color change, pallor, rash and wound.   Allergic/Immunologic: Positive for environmental allergies. Negative for food allergies and immunocompromised state.   Neurological: Positive for weakness and numbness. Negative for dizziness, tremors, seizures, syncope, facial asymmetry, speech difficulty, light-headedness and headaches.   Hematological: Negative for adenopathy. Does not bruise/bleed easily.   Psychiatric/Behavioral: Positive for dysphoric mood. Negative for agitation, behavioral problems, confusion, decreased concentration, hallucinations, self-injury, sleep disturbance and suicidal ideas. The patient is not nervous/anxious and is not hyperactive.        Objective   Blood pressure 131/94, pulse 76, resp. rate 18, height 160 cm (63\"), weight 78.3 kg (172 lb 9.6 oz), not currently breastfeeding.  Body mass index is 30.57 kg/m².    Physical Exam  Constitutional:       General: She is not in acute distress.     Appearance: She is well-developed. She is not diaphoretic.   HENT:      Head: Normocephalic and atraumatic.   Neck:      Comments: Cervical range of motion is somewhat decreased, however she does not have any pain with movement.  Pulmonary:      Effort: Pulmonary effort is normal.   Musculoskeletal:      Cervical back: No pain with movement. Decreased range of motion.      Comments: No focal motor weakness proximally or distally in her bilateral upper extremities.   Skin:     General: Skin is warm and dry.   Neurological:      Mental Status: She is alert and oriented to person, place, and time.      Cranial Nerves: No cranial nerve deficit.      Deep Tendon Reflexes:      Reflex Scores:       Tricep reflexes are 1+ on the right side and 1+ on the left side.       Bicep reflexes are 1+ on the right side and 1+ on the left side.       Brachioradialis reflexes are 1+ on the right side and 1+ on the left side.     "   Patellar reflexes are 1+ on the right side and 1+ on the left side.       Achilles reflexes are 1+ on the right side and 1+ on the left side.     Comments: Muscle stretch reflexes are symmetric and hyporeactive.  Babar sign is absent.  Romberg sign is absent.         Assessment/Plan     Independent Review of Radiographic Studies:      Available for my review is a CT scan of the cervical spine which was performed on January 28, 2022.  This demonstrates advanced degenerative disc disease at C4-5 and C5-6.  There are associated disc osteophyte complexes which appear to encroach somewhat on the neuroforamina.  Cervical lordosis is decreased.  I do not appreciate any fractures.    Medical Decision Making:      This is a 52-year-old woman with cervical degenerative disc disease, mild, chronic, intermittent neck pain and no complaints of cervical myelopathy or cervical radiculopathy.  There is no role for surgical intervention.  I reviewed the signs and symptoms of cervical radiculopathy and cervical myelopathy with her.  I directed her to contact my office with new or worsening symptoms.  I would be happy to follow-up with her on an as-needed basis moving forward.    Diagnoses and all orders for this visit:    1. Stage 3a chronic kidney disease (HCC) (Primary)    2. DDD (degenerative disc disease), cervical    3. Class 1 obesity due to excess calories with serious comorbidity and body mass index (BMI) of 30.0 to 30.9 in adult        No follow-ups on file.           This document signed by AMANDA Caro MD April 8, 2022 14:30 EDT

## 2022-04-22 ENCOUNTER — OFFICE VISIT (OUTPATIENT)
Dept: INTERNAL MEDICINE | Facility: CLINIC | Age: 52
End: 2022-04-22

## 2022-04-22 ENCOUNTER — LAB (OUTPATIENT)
Dept: LAB | Facility: HOSPITAL | Age: 52
End: 2022-04-22

## 2022-04-22 VITALS
HEART RATE: 80 BPM | TEMPERATURE: 96.8 F | RESPIRATION RATE: 18 BRPM | BODY MASS INDEX: 30.72 KG/M2 | SYSTOLIC BLOOD PRESSURE: 120 MMHG | OXYGEN SATURATION: 98 % | WEIGHT: 173.4 LBS | HEIGHT: 63 IN | DIASTOLIC BLOOD PRESSURE: 72 MMHG

## 2022-04-22 DIAGNOSIS — Z13.220 SCREENING, LIPID: ICD-10-CM

## 2022-04-22 DIAGNOSIS — Z13.1 SCREENING FOR DIABETES MELLITUS: ICD-10-CM

## 2022-04-22 DIAGNOSIS — Z78.9 ALCOHOL USE: ICD-10-CM

## 2022-04-22 DIAGNOSIS — E03.9 ACQUIRED HYPOTHYROIDISM: ICD-10-CM

## 2022-04-22 DIAGNOSIS — E55.9 VITAMIN D DEFICIENCY: ICD-10-CM

## 2022-04-22 DIAGNOSIS — F51.05 INSOMNIA DUE TO OTHER MENTAL DISORDER: ICD-10-CM

## 2022-04-22 DIAGNOSIS — E03.9 ACQUIRED HYPOTHYROIDISM: Primary | ICD-10-CM

## 2022-04-22 DIAGNOSIS — F33.41 RECURRENT MAJOR DEPRESSIVE DISORDER, IN PARTIAL REMISSION: ICD-10-CM

## 2022-04-22 DIAGNOSIS — K21.9 GERD WITHOUT ESOPHAGITIS: Chronic | ICD-10-CM

## 2022-04-22 DIAGNOSIS — F41.9 ANXIETY: ICD-10-CM

## 2022-04-22 DIAGNOSIS — N18.31 STAGE 3A CHRONIC KIDNEY DISEASE: ICD-10-CM

## 2022-04-22 DIAGNOSIS — D50.9 IRON DEFICIENCY ANEMIA, UNSPECIFIED IRON DEFICIENCY ANEMIA TYPE: ICD-10-CM

## 2022-04-22 DIAGNOSIS — F99 INSOMNIA DUE TO OTHER MENTAL DISORDER: ICD-10-CM

## 2022-04-22 DIAGNOSIS — M50.30 DDD (DEGENERATIVE DISC DISEASE), CERVICAL: ICD-10-CM

## 2022-04-22 DIAGNOSIS — R87.610 ASCUS OF CERVIX WITH NEGATIVE HIGH RISK HPV: ICD-10-CM

## 2022-04-22 LAB
DEPRECATED RDW RBC AUTO: 45.7 FL (ref 37–54)
ERYTHROCYTE [DISTWIDTH] IN BLOOD BY AUTOMATED COUNT: 12.8 % (ref 12.3–15.4)
HCT VFR BLD AUTO: 48.1 % (ref 34–46.6)
HGB BLD-MCNC: 16.2 G/DL (ref 12–15.9)
MCH RBC QN AUTO: 32.6 PG (ref 26.6–33)
MCHC RBC AUTO-ENTMCNC: 33.7 G/DL (ref 31.5–35.7)
MCV RBC AUTO: 96.8 FL (ref 79–97)
PLATELET # BLD AUTO: 246 10*3/MM3 (ref 140–450)
PMV BLD AUTO: 10.9 FL (ref 6–12)
RBC # BLD AUTO: 4.97 10*6/MM3 (ref 3.77–5.28)
WBC NRBC COR # BLD: 4.19 10*3/MM3 (ref 3.4–10.8)

## 2022-04-22 PROCEDURE — 83540 ASSAY OF IRON: CPT

## 2022-04-22 PROCEDURE — 82728 ASSAY OF FERRITIN: CPT

## 2022-04-22 PROCEDURE — 80050 GENERAL HEALTH PANEL: CPT

## 2022-04-22 PROCEDURE — 84466 ASSAY OF TRANSFERRIN: CPT

## 2022-04-22 PROCEDURE — 80061 LIPID PANEL: CPT

## 2022-04-22 PROCEDURE — 82306 VITAMIN D 25 HYDROXY: CPT

## 2022-04-22 PROCEDURE — 99214 OFFICE O/P EST MOD 30 MIN: CPT | Performed by: INTERNAL MEDICINE

## 2022-04-22 PROCEDURE — 83036 HEMOGLOBIN GLYCOSYLATED A1C: CPT

## 2022-04-22 RX ORDER — LEVOTHYROXINE SODIUM 0.05 MG/1
50 TABLET ORAL DAILY
Qty: 90 TABLET | Refills: 0 | Status: SHIPPED | OUTPATIENT
Start: 2022-04-22 | End: 2022-05-06

## 2022-04-22 RX ORDER — OMEPRAZOLE 20 MG/1
20 CAPSULE, DELAYED RELEASE ORAL DAILY
Qty: 90 CAPSULE | Refills: 0 | Status: SHIPPED | OUTPATIENT
Start: 2022-04-22 | End: 2022-05-06

## 2022-04-22 RX ORDER — CYCLOBENZAPRINE HCL 10 MG
10 TABLET ORAL 3 TIMES DAILY PRN
Qty: 30 TABLET | Refills: 0 | Status: SHIPPED | OUTPATIENT
Start: 2022-04-22 | End: 2022-09-06

## 2022-04-22 RX ORDER — FLUOXETINE HYDROCHLORIDE 40 MG/1
CAPSULE ORAL
Qty: 45 CAPSULE | Refills: 0 | OUTPATIENT
Start: 2022-04-22

## 2022-04-22 RX ORDER — FLUOXETINE HYDROCHLORIDE 40 MG/1
40 CAPSULE ORAL DAILY
Qty: 90 CAPSULE | Refills: 0 | Status: SHIPPED | OUTPATIENT
Start: 2022-04-22 | End: 2022-05-20

## 2022-04-23 LAB
25(OH)D3 SERPL-MCNC: 59.4 NG/ML (ref 30–100)
ALBUMIN SERPL-MCNC: 5.3 G/DL (ref 3.5–5.2)
ALBUMIN/GLOB SERPL: 1.8 G/DL
ALP SERPL-CCNC: 112 U/L (ref 39–117)
ALT SERPL W P-5'-P-CCNC: 39 U/L (ref 1–33)
ANION GAP SERPL CALCULATED.3IONS-SCNC: 12.5 MMOL/L (ref 5–15)
AST SERPL-CCNC: 36 U/L (ref 1–32)
BILIRUB SERPL-MCNC: 0.3 MG/DL (ref 0–1.2)
BUN SERPL-MCNC: 13 MG/DL (ref 6–20)
BUN/CREAT SERPL: 13.7 (ref 7–25)
CALCIUM SPEC-SCNC: 9.7 MG/DL (ref 8.6–10.5)
CHLORIDE SERPL-SCNC: 99 MMOL/L (ref 98–107)
CHOLEST SERPL-MCNC: 268 MG/DL (ref 0–200)
CO2 SERPL-SCNC: 26.5 MMOL/L (ref 22–29)
CREAT SERPL-MCNC: 0.95 MG/DL (ref 0.57–1)
EGFRCR SERPLBLD CKD-EPI 2021: 71.8 ML/MIN/1.73
FERRITIN SERPL-MCNC: 181 NG/ML (ref 13–150)
GLOBULIN UR ELPH-MCNC: 2.9 GM/DL
GLUCOSE SERPL-MCNC: 81 MG/DL (ref 65–99)
HBA1C MFR BLD: 5.2 % (ref 4.8–5.6)
HDLC SERPL-MCNC: 83 MG/DL (ref 40–60)
IRON 24H UR-MRATE: 108 MCG/DL (ref 37–145)
IRON SATN MFR SERPL: 26 % (ref 20–50)
LDLC SERPL CALC-MCNC: 156 MG/DL (ref 0–100)
LDLC/HDLC SERPL: 1.84 {RATIO}
POTASSIUM SERPL-SCNC: 4.5 MMOL/L (ref 3.5–5.2)
PROT SERPL-MCNC: 8.2 G/DL (ref 6–8.5)
SODIUM SERPL-SCNC: 138 MMOL/L (ref 136–145)
TIBC SERPL-MCNC: 416 MCG/DL (ref 298–536)
TRANSFERRIN SERPL-MCNC: 279 MG/DL (ref 200–360)
TRIGL SERPL-MCNC: 163 MG/DL (ref 0–150)
TSH SERPL DL<=0.05 MIU/L-ACNC: 0.84 UIU/ML (ref 0.27–4.2)
VLDLC SERPL-MCNC: 29 MG/DL (ref 5–40)

## 2022-05-06 ENCOUNTER — HOSPITAL ENCOUNTER (OUTPATIENT)
Dept: GENERAL RADIOLOGY | Facility: HOSPITAL | Age: 52
Discharge: HOME | End: 2022-05-06

## 2022-05-06 ENCOUNTER — OFFICE VISIT (OUTPATIENT)
Dept: INTERNAL MEDICINE | Facility: CLINIC | Age: 52
End: 2022-05-06

## 2022-05-06 VITALS
TEMPERATURE: 96.7 F | SYSTOLIC BLOOD PRESSURE: 130 MMHG | WEIGHT: 173.8 LBS | DIASTOLIC BLOOD PRESSURE: 98 MMHG | HEART RATE: 80 BPM | OXYGEN SATURATION: 97 % | RESPIRATION RATE: 16 BRPM | BODY MASS INDEX: 31.98 KG/M2 | HEIGHT: 62 IN

## 2022-05-06 DIAGNOSIS — E03.9 ACQUIRED HYPOTHYROIDISM: ICD-10-CM

## 2022-05-06 DIAGNOSIS — K21.9 GERD WITHOUT ESOPHAGITIS: Chronic | ICD-10-CM

## 2022-05-06 DIAGNOSIS — Z00.00 ANNUAL PHYSICAL EXAM: ICD-10-CM

## 2022-05-06 DIAGNOSIS — R06.02 SHORTNESS OF BREATH: Primary | ICD-10-CM

## 2022-05-06 DIAGNOSIS — R06.02 SHORTNESS OF BREATH: ICD-10-CM

## 2022-05-06 DIAGNOSIS — Z12.31 VISIT FOR SCREENING MAMMOGRAM: ICD-10-CM

## 2022-05-06 DIAGNOSIS — Z87.891 PERSONAL HISTORY OF TOBACCO USE, PRESENTING HAZARDS TO HEALTH: ICD-10-CM

## 2022-05-06 PROCEDURE — 93000 ELECTROCARDIOGRAM COMPLETE: CPT

## 2022-05-06 PROCEDURE — 71046 X-RAY EXAM CHEST 2 VIEWS: CPT

## 2022-05-06 PROCEDURE — 99396 PREV VISIT EST AGE 40-64: CPT

## 2022-05-06 PROCEDURE — 99214 OFFICE O/P EST MOD 30 MIN: CPT

## 2022-05-06 RX ORDER — OMEPRAZOLE 20 MG/1
20 CAPSULE, DELAYED RELEASE ORAL DAILY
Qty: 90 CAPSULE | Refills: 1 | Status: SHIPPED | OUTPATIENT
Start: 2022-05-06 | End: 2023-01-03 | Stop reason: SDUPTHER

## 2022-05-06 RX ORDER — LEVOTHYROXINE SODIUM 0.05 MG/1
50 TABLET ORAL DAILY
Qty: 90 TABLET | Refills: 2 | Status: SHIPPED | OUTPATIENT
Start: 2022-05-06 | End: 2023-02-23 | Stop reason: SDUPTHER

## 2022-05-06 RX ORDER — ALBUTEROL SULFATE 90 UG/1
2 AEROSOL, METERED RESPIRATORY (INHALATION) EVERY 4 HOURS PRN
Qty: 8 G | Refills: 1 | Status: SHIPPED | OUTPATIENT
Start: 2022-05-06 | End: 2022-09-06

## 2022-05-06 NOTE — PROGRESS NOTES
"Chief Complaint  Annual Exam    Mona Wilkerson presents to [facility]    Hypothyroidism- Taking synthroid each day with no omitted doses and no side effects reported. Currently asymptomatic. Last TSH  (4/22) .842.     GERD- Taking omeprazole each day with no omitted doses and no side effects reported. Reports good symptom control. No known trigger foods. Has quit smoking the past 3 weeks. Does endorse heavy alcohol use. No alarm S/S.     SOA: Reports shortness of breath on exertion for the past year. Has a 30 pk yr smoking history. States she can maybe go up one flight of stairs without becoming short of breath. Did have a daily cough which has since went away when she stopped smoking. Able to recover rather quickly. Denies chest pains, wheezing, fevers, chills, PND, night sweats, chest tightness, or palpitations.     The patient is being seen for a health maintenance evaluation.  The last health maintenance was 1 year(s) ago.    Reported Health  Good Yes  Fair No  Poor No    Social History  Mona  does not smoke cigarettes. Quit smoking 3 weeks ago.   She drinks frequent alcohol. Drinking 4 nights per week. Drinks 6-8 beers on occasion.   She does not use illicit drugs.    General History  Mona  does have regular dental visits.  She does not complain of vision problems. Last eye exam was 2021.   Immunizations are not up to date. The patient needs the following immunizations: 2nd dose of shingrix.     Lifestyle  Mona  consumes in general, an \"unhealthy\" diet.  She exercises never.  Healthy Diet No  Weight Concerns Yes    Reproductive Health  Mona Is post endometrial ablation.    She reports periods are never.   She is not sexually active. Her contraceptive plan is endometrial ablation.     Screening  Last pap was 11/21. MARIA L Puga. History of abnormal pap smear or family history of gyn cancer: Yes. Needs follow-up PAP. Has appointment scheduled in 2 " weeks.   Last mammogram was 2021. Personal or family history of abnormal mammograms or breast cancer: Mother dx with breast cancer at 53.   Last colonoscopy was 2021 by Dr. Nice. Family history of colon cancer: No.     Health Maintenance -  10-year risk of heart disease or stroke using the ASCVD algorithm = 1.5%  Wearing seatbelt: yes  Smoke detectors in home: yes    Patient denies fever, chills, headache, ear pain, sore throat, cough, wheezing, chest pain, palpitations, abdominal pain, nausea, vomiting, diarrhea, dysuria, blood in stool or urine. Mood is stable. Eating and drinking as usual. No unexplained weight loss or gain.     Subjective       Health Maintenance   Topic   • ANNUAL PHYSICAL    • ZOSTER VACCINE (2 of 2)   • INFLUENZA VACCINE    • LIPID PANEL    • MAMMOGRAM    • PAP SMEAR    • TDAP/TD VACCINES (2 - Td or Tdap)   • COLORECTAL CANCER SCREENING    • HEPATITIS C SCREENING    • COVID-19 Vaccine    • Pneumococcal Vaccine 0-64        PMSFH  The following portions of the patient's history were reviewed and updated as appropriate: allergies, current medications, past family history, past medical history, past social history, past surgical history and problem list.     Past Medical History:   Diagnosis Date   • Cervical disc disorder    • Cyst of ovary 11/21/2016   • Depression    • Depression with anxiety 11/04/2016    seeing [facility]- doing well  off lithium  changed to lamictal getting adhd testing   • GERD (gastroesophageal reflux disease)    • Hyperlipidemia 11/04/2016 26 Jan 2016  reviewed lipid panel with her    • Hypothyroidism    • Low back pain    • Lumbosacral disc disease    • Primary insomnia 11/21/2016   • Sleep difficulties       No Known Allergies   Social History     Tobacco Use   • Smoking status: Former Smoker     Packs/day: 0.75     Years: 30.00     Pack years: 22.50     Types: Cigarettes   • Smokeless tobacco: Never Used   • Tobacco comment: last cigarette 4/15/2022   Vaping Use   •  Vaping Use: Never used   Substance Use Topics   • Alcohol use: Yes     Alcohol/week: 30.0 standard drinks     Types: 30 Cans of beer per week     Comment: Occasionally   • Drug use: No     Past Surgical History:   Procedure Laterality Date   • COLONOSCOPY     • COLONOSCOPY W/ POLYPECTOMY  10/29/2021    Dr. Nice, diverticulosis and removal of tubular adenoma   • ENDOMETRIAL ABLATION W/ NOVASURE     • ESSURE TUBAL LIGATION     • EYE SURGERY  2002    Lasik   • LASIK Bilateral    • LIPOSUCTION     • RHINOPLASTY        Family History   Problem Relation Age of Onset   • Thyroid disease Mother    • Breast cancer Mother 53   • Hypertension Mother         Essential HTN   • Hyperlipidemia Mother    • Myelodysplastic syndrome Mother    • Cancer Mother         Breast CA, lumpectomy w/ nodes   • Alcohol abuse Sister    • Hypertension Sister         Essential HTN   • Hyperlipidemia Sister    • Migraines Sister         Migraine headaches   • Diabetes Paternal Aunt    • Hypertension Father         Essential HTN   • Hyperlipidemia Father    • Myelodysplastic syndrome Father    • Breast cancer Maternal Grandmother 74   • Arthritis Sister         RA versus psoriatic arthritis   • Hypertension Sister    • Hypothyroidism Sister    • Hyperlipidemia Sister    • Thyroid disease Sister    • Ovarian cancer Neg Hx    • Colon cancer Neg Hx    • Liver cancer Neg Hx    • Rectal cancer Neg Hx    • Stomach cancer Neg Hx          Current Outpatient Medications:   •  Biotin 1000 MCG tablet, Take 1,000 mcg by mouth Daily., Disp: , Rfl:   •  cetirizine (zyrTEC) 10 MG tablet, Take 10 mg by mouth Daily., Disp: , Rfl:   •  cholecalciferol (VITAMIN D3) 25 MCG (1000 UT) tablet, Take 5,000 Units by mouth Daily., Disp: , Rfl:   •  cyclobenzaprine (FLEXERIL) 10 MG tablet, Take 1 tablet by mouth 3 (Three) Times a Day As Needed for Muscle Spasms., Disp: 30 tablet, Rfl: 0  •  estradiol (Vagifem) 10 MCG tablet vaginal tablet, Insert 1 tablet into the vagina 2  "(Two) Times a Week., Disp: 24 tablet, Rfl: 3  •  FLUoxetine (PROzac) 40 MG capsule, Take 1 capsule by mouth Daily., Disp: 90 capsule, Rfl: 0  •  levothyroxine (SYNTHROID, LEVOTHROID) 50 MCG tablet, Take 1 tablet by mouth Daily., Disp: 90 tablet, Rfl: 2  •  omeprazole (priLOSEC) 20 MG capsule, Take 1 capsule by mouth Daily., Disp: 90 capsule, Rfl: 1  •  traZODone (DESYREL) 100 MG tablet, Take 1 tablet by mouth Every Night., Disp: 90 tablet, Rfl: 1  •  albuterol sulfate  (90 Base) MCG/ACT inhaler, Inhale 2 puffs Every 4 (Four) Hours As Needed for Wheezing or Shortness of Air., Disp: 8 g, Rfl: 1    Review of Systems   Constitutional: Negative for appetite change, chills, diaphoresis, fatigue, fever and unexpected weight loss.   HENT: Negative for nosebleeds, rhinorrhea, sinus pressure, sore throat, swollen glands, trouble swallowing and voice change.    Eyes: Negative for blurred vision, double vision, photophobia and visual disturbance.   Respiratory: Positive for shortness of breath. Negative for apnea, cough, chest tightness and wheezing.    Cardiovascular: Negative for chest pain, palpitations and leg swelling.   Gastrointestinal: Negative for abdominal pain, blood in stool, constipation, diarrhea, nausea, vomiting and GERD.   Genitourinary: Negative for decreased urine volume, difficulty urinating, dysuria and flank pain.   Musculoskeletal: Negative for arthralgias and myalgias.   Skin: Negative for color change, rash and skin lesions.   Neurological: Negative for dizziness, syncope, facial asymmetry, speech difficulty, weakness, light-headedness and headache.   Psychiatric/Behavioral: Negative for behavioral problems, dysphoric mood, hallucinations, self-injury, sleep disturbance, suicidal ideas and depressed mood. The patient is not nervous/anxious.        Objective   Vital Signs  /98   Pulse 80   Temp 96.7 °F (35.9 °C)   Resp 16   Ht 158 cm (62.21\")   Wt 78.8 kg (173 lb 12.8 oz)   SpO2 97%   " BMI 31.58 kg/m²     Physical Exam  Constitutional:       General: She is not in acute distress.     Appearance: Normal appearance. She is obese. She is not ill-appearing or toxic-appearing.   HENT:      Head: Normocephalic.      Right Ear: Hearing, tympanic membrane, ear canal and external ear normal.      Left Ear: Hearing, tympanic membrane, ear canal and external ear normal.      Nose: Nose normal. No congestion or rhinorrhea.      Mouth/Throat:      Mouth: Mucous membranes are moist.      Pharynx: Oropharynx is clear. No oropharyngeal exudate or posterior oropharyngeal erythema.   Eyes:      General: Lids are normal. No allergic shiner, visual field deficit or scleral icterus.     Extraocular Movements: Extraocular movements intact.      Right eye: No nystagmus.      Left eye: No nystagmus.      Conjunctiva/sclera: Conjunctivae normal.      Pupils: Pupils are equal, round, and reactive to light.   Neck:      Thyroid: No thyroid mass, thyromegaly or thyroid tenderness.      Vascular: No carotid bruit.      Trachea: Trachea and phonation normal.   Cardiovascular:      Rate and Rhythm: Normal rate and regular rhythm.      Chest Wall: PMI is not displaced. No thrill.      Pulses: Normal pulses.           Radial pulses are 2+ on the right side and 2+ on the left side.        Dorsalis pedis pulses are 2+ on the right side and 2+ on the left side.        Posterior tibial pulses are 2+ on the right side and 2+ on the left side.      Heart sounds: No murmur heard.    No gallop. No S3 sounds.   Pulmonary:      Effort: Pulmonary effort is normal.      Breath sounds: Normal breath sounds.   Chest:   Breasts:      Right: No axillary adenopathy or supraclavicular adenopathy.      Left: No axillary adenopathy or supraclavicular adenopathy.       Abdominal:      General: Abdomen is flat. Bowel sounds are normal.      Palpations: Abdomen is soft.      Tenderness: There is no abdominal tenderness. There is no guarding or rebound.       Hernia: No hernia is present.   Musculoskeletal:         General: Normal range of motion.      Cervical back: Normal range of motion and neck supple. No rigidity.      Right lower leg: No edema.      Left lower leg: No edema.   Lymphadenopathy:      Head:      Right side of head: No submental, submandibular, tonsillar, preauricular, posterior auricular or occipital adenopathy.      Left side of head: No submental, submandibular, tonsillar, preauricular, posterior auricular or occipital adenopathy.      Cervical: No cervical adenopathy.      Upper Body:      Right upper body: No supraclavicular, axillary, pectoral or epitrochlear adenopathy.      Left upper body: No supraclavicular, axillary, pectoral or epitrochlear adenopathy.   Skin:     General: Skin is warm and dry.      Capillary Refill: Capillary refill takes less than 2 seconds.      Findings: No abrasion.      Nails: There is no clubbing.   Neurological:      General: No focal deficit present.      Mental Status: She is alert and oriented to person, place, and time.      GCS: GCS eye subscore is 4. GCS verbal subscore is 5. GCS motor subscore is 6.      Cranial Nerves: Cranial nerves are intact.      Motor: Motor function is intact.      Coordination: Coordination is intact.      Deep Tendon Reflexes: Reflexes are normal and symmetric.   Psychiatric:         Attention and Perception: Attention and perception normal.         Mood and Affect: Mood and affect normal.         Speech: Speech normal.         Behavior: Behavior normal. Behavior is cooperative.         Thought Content: Thought content normal.         Cognition and Memory: Cognition and memory normal.         Judgment: Judgment normal.          Result Review :     The following data was reviewed by: MARIA L Mc on 05/06/2022:  CMP    CMP 9/3/21 4/22/22   Glucose 91 81   BUN 14 13   Creatinine 0.86 0.95   eGFR Non African Am 69    Sodium 136 138   Potassium 4.2 4.5   Chloride 99 99    Calcium 9.7 9.7   Albumin  5.30 (A)   Total Bilirubin  0.3   Alkaline Phosphatase  112   AST (SGOT)  36 (A)   ALT (SGPT)  39 (A)   (A) Abnormal value            CBC    CBC 9/3/21 4/22/22   WBC 6.73 4.19   RBC 4.85 4.97   Hemoglobin 15.5 16.2 (A)   Hematocrit 45.3 48.1 (A)   MCV 93.4 96.8   MCH 32.0 32.6   MCHC 34.2 33.7   RDW 15.7 (A) 12.8   Platelets 278 246   (A) Abnormal value            TSH    TSH 4/22/22   TSH 0.842           Data reviewed: Radiologic studies CXR 5/6/22     IMPRESSION:  No evidence of acute disease in the chest.       ECG 12 Lead    Date/Time: 5/6/2022 6:11 PM  Performed by: Ankur Burns APRN  Authorized by: Ankur Burns APRN   Comparison: not compared with previous ECG   Rhythm: sinus rhythm  Rate: normal  Conduction: conduction normal  ST Segments: ST segments normal  T Waves: T waves normal  QRS axis: normal  Other: no other findings    Clinical impression: normal ECG            Assessment and Plan    1. Acquired hypothyroidism  - levothyroxine (SYNTHROID, LEVOTHROID) 50 MCG tablet; Take 1 tablet by mouth Daily.  Dispense: 90 tablet; Refill: 2  - Controlled. Continue with synthroid as prescribed.     2. GERD without esophagitis  - omeprazole (priLOSEC) 20 MG capsule; Take 1 capsule by mouth Daily.  Dispense: 90 capsule; Refill: 1  - Controlled. Continue with Prilosec as prescribed.   - Discussed Potential adverse effects with long-term PPI usage include C. difficile, atrophic gastritis, hypergastrinemia, microscopic colitis, magnesium malabsorption, calcium malabsorption, increased fracture risk, osteoporosis, vitamin B12 malabsorption, iron malabsorption, interstitial nephritis and resulting kidney disease, drug-induced lupus, dementia, pneumonia, and increased risk for overall mortality have been discussed with the patient.     3. Shortness of breath  - ECG 12 Lead  - proBNP; Future  - Full Pulmonary Function Test With Bronchodilator; Future  - XR Chest PA & Lateral; Future  -  albuterol sulfate  (90 Base) MCG/ACT inhaler; Inhale 2 puffs Every 4 (Four) Hours As Needed for Wheezing or Shortness of Air.  Dispense: 8 g; Refill: 1  - COVID-19 PCR, [facility], NP SWAB IN [facility]SP[facility] MEDIA/ORAL SWISH 24-30 HR TAT - Swab, Nasopharynx; Future  - ECG WNL and CXR negative. No etiology identified on labs. Will trial albuterol inhaler and evaluate for improvement. Will review PFT's for etiology. Encouraged to follow-up in office with PCP if work-up unrevealing for further work-up and evaluation.     4. Visit for screening mammogram  - Mammo screening digital tomosynthesis bilateral w CAD; Future    5. Personal history of tobacco use, presenting hazards to health  -  CT Chest Low Dose Cancer Screening WO; Future    6. Annual physical Exam   - Nutrition and activity goals reviewed including: mainly water to drink, limit white flour/processed sugar; increase high protein, high fiber carbs, good breakfast, working toward 150 mins cardio per week, resistance training 2x/week.    The patient is here for a health maintenance visit.  Screening lab work is ordered.  Immunizations are reported as current.  Advice and education is given regarding nutrition, aerobic exercise, routine dental evaluations, routine eye exams, reproductive health, cardiovascular risk reduction.  Further recommendations after lab evaluation.  Annual wellness evaluations recommended.     I discussed the patients findings and my recommendations with patient.  Patient was encouraged to keep me informed of any acute changes or any new concerning symptoms.  Patient voiced understanding of all instructions and denied further questions.    Follow Up     Return in about 1 year (around 5/6/2023) for Annual.    Patient was given instructions and counseling regarding her condition or for health maintenance advice. Please see specific information pulled into the AVS if appropriate.    Part of this note may be an electronic  transcription/translation of spoken language to printed text using the Dragon Dictation System.    Electronically signed by:   MARIA L Mc  05/06/2022

## 2022-05-08 DIAGNOSIS — D75.1 POLYCYTHEMIA: ICD-10-CM

## 2022-05-08 DIAGNOSIS — R79.89 ELEVATED LFTS: Primary | ICD-10-CM

## 2022-05-20 ENCOUNTER — OFFICE VISIT (OUTPATIENT)
Dept: OBSTETRICS AND GYNECOLOGY | Facility: CLINIC | Age: 52
End: 2022-05-20

## 2022-05-20 VITALS
DIASTOLIC BLOOD PRESSURE: 84 MMHG | BODY MASS INDEX: 31.47 KG/M2 | WEIGHT: 171 LBS | SYSTOLIC BLOOD PRESSURE: 140 MMHG | HEIGHT: 62 IN

## 2022-05-20 DIAGNOSIS — N95.2 ATROPHY OF VAGINA: Primary | ICD-10-CM

## 2022-05-20 DIAGNOSIS — Z09 FOLLOW-UP EXAM: ICD-10-CM

## 2022-05-20 PROCEDURE — 99212 OFFICE O/P EST SF 10 MIN: CPT | Performed by: NURSE PRACTITIONER

## 2022-05-20 RX ORDER — ZOLPIDEM TARTRATE 5 MG/1
1 TABLET ORAL NIGHTLY
COMMUNITY
Start: 2022-05-12 | End: 2023-02-23

## 2022-05-20 RX ORDER — FLUOXETINE HYDROCHLORIDE 20 MG/1
3 CAPSULE ORAL DAILY
COMMUNITY
Start: 2022-05-14 | End: 2022-07-21 | Stop reason: SDUPTHER

## 2022-05-20 NOTE — PROGRESS NOTES
Chief Complaint  Mona Wilkerson is a 52 y.o.  female presenting for Follow-up (Follow-up after starting vaginal estrogen therapy.)    History of Present Illness  Mona is a very pleasant 53yo woman, , here for FU of vaginal atrophy.  Her last pap (2021) was ASCUS, but negative for any of the HR HPV.  We did testing for bacterial and fungal infections in November, and there were no infections.  She was started on Vagifem (generic) twice weekly.  She is pretty faithful about using the medication twice weekly.  She is not SA.  Denies having any UTIs.      The following portions of the patient's history were reviewed and updated as appropriate: allergies, current medications, past family history, past medical history, past social history, past surgical history and problem list.    No Known Allergies      Current Outpatient Medications:   •  albuterol sulfate  (90 Base) MCG/ACT inhaler, Inhale 2 puffs Every 4 (Four) Hours As Needed for Wheezing or Shortness of Air., Disp: 8 g, Rfl: 1  •  Biotin 1000 MCG tablet, Take 1,000 mcg by mouth Daily., Disp: , Rfl:   •  cetirizine (zyrTEC) 10 MG tablet, Take 10 mg by mouth Daily., Disp: , Rfl:   •  cholecalciferol (VITAMIN D3) 25 MCG (1000 UT) tablet, Take 5,000 Units by mouth Daily., Disp: , Rfl:   •  cyclobenzaprine (FLEXERIL) 10 MG tablet, Take 1 tablet by mouth 3 (Three) Times a Day As Needed for Muscle Spasms., Disp: 30 tablet, Rfl: 0  •  estradiol (Vagifem) 10 MCG tablet vaginal tablet, Insert 1 tablet into the vagina 2 (Two) Times a Week., Disp: 24 tablet, Rfl: 3  •  FLUoxetine (PROzac) 20 MG capsule, Take 3 capsules by mouth Daily., Disp: , Rfl:   •  levothyroxine (SYNTHROID, LEVOTHROID) 50 MCG tablet, Take 1 tablet by mouth Daily., Disp: 90 tablet, Rfl: 2  •  omeprazole (priLOSEC) 20 MG capsule, Take 1 capsule by mouth Daily., Disp: 90 capsule, Rfl: 1  •  zolpidem (AMBIEN) 5 MG tablet, Take 1 tablet by mouth Every Night., Disp: , Rfl:  "    Past Medical History:   Diagnosis Date   • Cervical disc disorder    • Cyst of ovary 11/21/2016   • Depression    • Depression with anxiety 11/04/2016    seeing [facility]- doing well  off lithium  changed to lamictal getting adhd testing   • GERD (gastroesophageal reflux disease)    • Hyperlipidemia 11/04/2016 26 Jan 2016  reviewed lipid panel with her    • Hypothyroidism    • Low back pain    • Lumbosacral disc disease    • Primary insomnia 11/21/2016   • Sleep difficulties         Past Surgical History:   Procedure Laterality Date   • COLONOSCOPY     • COLONOSCOPY W/ POLYPECTOMY  10/29/2021    Dr. Nice, diverticulosis and removal of tubular adenoma   • ENDOMETRIAL ABLATION W/ NOVASURE     • ESSURE TUBAL LIGATION     • EYE SURGERY  2002    Lasik   • LASIK Bilateral    • LIPOSUCTION     • RHINOPLASTY         Objective  /84   Ht 158 cm (62.21\")   Wt 77.6 kg (171 lb)   Breastfeeding No   BMI 31.07 kg/m²     Physical Exam  Exam conducted with a chaperone present.   Constitutional:       Appearance: Normal appearance.   Abdominal:      General: Bowel sounds are normal.      Palpations: Abdomen is soft. There is no mass.      Tenderness: There is no abdominal tenderness.   Genitourinary:     General: Normal vulva.      Labia:         Right: No rash, tenderness or lesion.         Left: No rash, tenderness or lesion.       Vagina: Normal. No erythema.      Cervix: No cervical motion tenderness, discharge, lesion or erythema.      Uterus: Normal. Not enlarged and not tender.       Adnexa: Right adnexa normal and left adnexa normal.        Right: No mass or tenderness.          Left: No mass or tenderness.        Rectum: Normal.      Comments: Vaginal mucosa is pink, intact.  (Much better estrogenized.)  Anus appears wnl.  (No rectal exam performed.)        Assessment/Plan   Diagnoses and all orders for this visit:    1. Atrophy of vagina (Primary)    2. Follow-up exam    Atrophy is much improved.  Will repeat " the pap at Annual exam in Sept.    Procedures        Return in about 4 months (around 9/20/2022) for Annual physical.    Jen Ramos, APRN  05/20/2022

## 2022-05-23 ENCOUNTER — APPOINTMENT (OUTPATIENT)
Dept: PREADMISSION TESTING | Facility: HOSPITAL | Age: 52
End: 2022-05-23

## 2022-05-24 ENCOUNTER — OFFICE VISIT (OUTPATIENT)
Dept: SLEEP MEDICINE | Facility: HOSPITAL | Age: 52
End: 2022-05-24

## 2022-05-24 ENCOUNTER — LAB (OUTPATIENT)
Dept: LAB | Facility: HOSPITAL | Age: 52
End: 2022-05-24

## 2022-05-24 VITALS
BODY MASS INDEX: 30.87 KG/M2 | OXYGEN SATURATION: 98 % | DIASTOLIC BLOOD PRESSURE: 87 MMHG | SYSTOLIC BLOOD PRESSURE: 147 MMHG | HEART RATE: 75 BPM | HEIGHT: 63 IN | WEIGHT: 174.2 LBS

## 2022-05-24 DIAGNOSIS — R03.0 ELEVATED BLOOD PRESSURE READING: ICD-10-CM

## 2022-05-24 DIAGNOSIS — E66.9 OBESITY (BMI 30-39.9): ICD-10-CM

## 2022-05-24 DIAGNOSIS — R79.89 ELEVATED LFTS: ICD-10-CM

## 2022-05-24 DIAGNOSIS — Z78.9 ALCOHOL USE: ICD-10-CM

## 2022-05-24 DIAGNOSIS — R06.83 SNORING: ICD-10-CM

## 2022-05-24 DIAGNOSIS — D75.1 POLYCYTHEMIA: ICD-10-CM

## 2022-05-24 DIAGNOSIS — R29.818 SUSPECTED SLEEP APNEA: Primary | ICD-10-CM

## 2022-05-24 DIAGNOSIS — R06.02 SHORTNESS OF BREATH: ICD-10-CM

## 2022-05-24 DIAGNOSIS — F51.04 CHRONIC INSOMNIA: ICD-10-CM

## 2022-05-24 LAB
ALBUMIN SERPL-MCNC: 4.7 G/DL (ref 3.5–5.2)
ALBUMIN/GLOB SERPL: 2.1 G/DL
ALP SERPL-CCNC: 112 U/L (ref 39–117)
ALT SERPL W P-5'-P-CCNC: 27 U/L (ref 1–33)
ANION GAP SERPL CALCULATED.3IONS-SCNC: 15 MMOL/L (ref 5–15)
AST SERPL-CCNC: 27 U/L (ref 1–32)
BASOPHILS # BLD AUTO: 0.1 10*3/MM3 (ref 0–0.2)
BASOPHILS NFR BLD AUTO: 1.7 % (ref 0–1.5)
BILIRUB SERPL-MCNC: 0.3 MG/DL (ref 0–1.2)
BUN SERPL-MCNC: 12 MG/DL (ref 6–20)
BUN/CREAT SERPL: 13 (ref 7–25)
CALCIUM SPEC-SCNC: 9.3 MG/DL (ref 8.6–10.5)
CHLORIDE SERPL-SCNC: 98 MMOL/L (ref 98–107)
CO2 SERPL-SCNC: 24 MMOL/L (ref 22–29)
CREAT SERPL-MCNC: 0.92 MG/DL (ref 0.57–1)
DEPRECATED RDW RBC AUTO: 42.8 FL (ref 37–54)
EGFRCR SERPLBLD CKD-EPI 2021: 74.6 ML/MIN/1.73
EOSINOPHIL # BLD AUTO: 0.24 10*3/MM3 (ref 0–0.4)
EOSINOPHIL NFR BLD AUTO: 4 % (ref 0.3–6.2)
ERYTHROCYTE [DISTWIDTH] IN BLOOD BY AUTOMATED COUNT: 12.3 % (ref 12.3–15.4)
GLOBULIN UR ELPH-MCNC: 2.2 GM/DL
GLUCOSE SERPL-MCNC: 84 MG/DL (ref 65–99)
HCT VFR BLD AUTO: 42.8 % (ref 34–46.6)
HGB BLD-MCNC: 14.6 G/DL (ref 12–15.9)
IMM GRANULOCYTES # BLD AUTO: 0.01 10*3/MM3 (ref 0–0.05)
IMM GRANULOCYTES NFR BLD AUTO: 0.2 % (ref 0–0.5)
LYMPHOCYTES # BLD AUTO: 2.23 10*3/MM3 (ref 0.7–3.1)
LYMPHOCYTES NFR BLD AUTO: 37.2 % (ref 19.6–45.3)
MCH RBC QN AUTO: 32.3 PG (ref 26.6–33)
MCHC RBC AUTO-ENTMCNC: 34.1 G/DL (ref 31.5–35.7)
MCV RBC AUTO: 94.7 FL (ref 79–97)
MONOCYTES # BLD AUTO: 0.41 10*3/MM3 (ref 0.1–0.9)
MONOCYTES NFR BLD AUTO: 6.8 % (ref 5–12)
NEUTROPHILS NFR BLD AUTO: 3.01 10*3/MM3 (ref 1.7–7)
NEUTROPHILS NFR BLD AUTO: 50.1 % (ref 42.7–76)
NRBC BLD AUTO-RTO: 0 /100 WBC (ref 0–0.2)
NT-PROBNP SERPL-MCNC: 78 PG/ML (ref 0–900)
PLATELET # BLD AUTO: 267 10*3/MM3 (ref 140–450)
PMV BLD AUTO: 10.4 FL (ref 6–12)
POTASSIUM SERPL-SCNC: 3.8 MMOL/L (ref 3.5–5.2)
PROT SERPL-MCNC: 6.9 G/DL (ref 6–8.5)
RBC # BLD AUTO: 4.52 10*6/MM3 (ref 3.77–5.28)
SODIUM SERPL-SCNC: 137 MMOL/L (ref 136–145)
WBC NRBC COR # BLD: 6 10*3/MM3 (ref 3.4–10.8)

## 2022-05-24 PROCEDURE — 83880 ASSAY OF NATRIURETIC PEPTIDE: CPT

## 2022-05-24 PROCEDURE — 85025 COMPLETE CBC W/AUTO DIFF WBC: CPT

## 2022-05-24 PROCEDURE — 80053 COMPREHEN METABOLIC PANEL: CPT

## 2022-05-24 PROCEDURE — 99204 OFFICE O/P NEW MOD 45 MIN: CPT | Performed by: INTERNAL MEDICINE

## 2022-05-24 NOTE — PROGRESS NOTES
New Sleep Patient Office Visit      Patient Name: Mona Wilkerson    Referring Physician: Muna Huitron MD    Chief Complaint:    Chief Complaint   Patient presents with   • Sleeping Problem       History of Present Illness: Mona Wilkerson is a 52 y.o. female who is here today to establish care with Sleep Medicine.    52-year-old female with past medical history of hypothyroidism, GERD, anxiety/depression, chronic smoking quit last month, alcohol abuse history presenting for initial evaluation.  Patient states that she feels fatigued all day long.  She has been told that she snores loudly.  She has not woken herself up snoring or with apneas.  Denies any orthopnea or PND symptoms.  She states that she used to be on trazodone for a number of years after her bout with depression where she was admitted in the [facility].  Recently she was taken off trazodone and placed on Ambien 5 mg about 2 weeks ago and she is sleeping better.  She has noticed some improvement in daytime fatigue after switching from trazodone.  She has also started working toward quitting alcohol.  She used to drink 12 packs of beer a night but now she is down to 6-8 beers a night and working towards quitting.  She is also quit smoking in the interim.  All these things have made her feel better.  She states that she is having issues with intermittent nightmares which she thought was from trazodone.  She does wake up with dry mouth.  Denies any headaches.  Denies any driving problems or sleep-related accidents at work or on the road.  She does talk in her sleep.  Denies any sleepwalking or any other episodes.  Denies any REM behavior disorder.  Denies any history of concussions or significant head trauma.    Patient works here as endoscopy nurse.  Denies any night shift work.    Further sleep history is as below.    [place] Scale: 0/24    Estimated average amount of sleep per night: 5-6 h  Number of times  she wakes up at night:  1-2  Difficulty falling back asleep: no  It usually takes 15-20 minutes to go to sleep. Alcohol with Ambien  She feels sleepy upon waking up: no  Rotating or night shift work: no    Drowsiness/Sleepiness:  She exhibits the following:  excessive daytime fatigue    Snoring/Breathing:  She exhibits the following:  loud snoring  snores in all sleep positions  awoken with dry mouth    Reflux:  She describes the following:  takes medication for reflux    Narcolepsy:  She exhibits the following:  none    RLS/PLMs:  She describes the following:  none    Insomnia:  She describes the following:  frequent awakenings    Parasomnia:  She exhibits the following:  NA    Weight:  Weight change in the last year:  Stable    Subjective      Review of Systems:   Review of Systems   Constitutional: Positive for fatigue.   HENT: Positive for postnasal drip.    Respiratory: Negative.    Cardiovascular: Negative.    Gastrointestinal: Negative.    Endocrine: Negative.    Genitourinary: Positive for urgency.   Musculoskeletal: Positive for back pain, neck pain and neck stiffness.   Skin: Negative.    Allergic/Immunologic: Positive for environmental allergies.   Neurological: Negative.    Hematological: Negative.    Psychiatric/Behavioral: Negative.    All other systems reviewed and are negative.      Past Medical History:   Past Medical History:   Diagnosis Date   • Cervical disc disorder    • Cyst of ovary 11/21/2016   • Depression    • Depression with anxiety 11/04/2016    seeing [facility]- doing well  off lithium  changed to lamictal getting adhd testing   • GERD (gastroesophageal reflux disease)    • Hyperlipidemia 11/04/2016 26 Jan 2016  reviewed lipid panel with her    • Hypothyroidism    • Low back pain    • Lumbosacral disc disease    • Primary insomnia 11/21/2016   • Sleep difficulties        Past Surgical History:   Past Surgical History:   Procedure Laterality Date   • COLONOSCOPY     • COLONOSCOPY W/ POLYPECTOMY  10/29/2021      Pezzi, diverticulosis and removal of tubular adenoma   • ENDOMETRIAL ABLATION W/ NOVASURE     • ESSURE TUBAL LIGATION     • EYE SURGERY      Lasik   • LASIK Bilateral    • LIPOSUCTION     • RHINOPLASTY         Family History:   Family History   Problem Relation Age of Onset   • Sleep disorder Mother    • Thyroid disease Mother    • Breast cancer Mother 53   • Hypertension Mother         Essential HTN   • Hyperlipidemia Mother    • Myelodysplastic syndrome Mother    • Cancer Mother         Breast CA, lumpectomy w/ nodes   • Hypertension Father         Essential HTN   • Hyperlipidemia Father    • Myelodysplastic syndrome Father    • Alcohol abuse Sister    • Hypertension Sister         Essential HTN   • Hyperlipidemia Sister    • Migraines Sister         Migraine headaches   • Sleep disorder Sister    • Arthritis Sister         RA versus psoriatic arthritis   • Hypertension Sister    • Hypothyroidism Sister    • Hyperlipidemia Sister    • Thyroid disease Sister    • Diabetes Paternal Aunt    • Breast cancer Maternal Grandmother 74   • Ovarian cancer Neg Hx    • Colon cancer Neg Hx    • Liver cancer Neg Hx    • Rectal cancer Neg Hx    • Stomach cancer Neg Hx        Social History:   Social History     Socioeconomic History   • Marital status:    Tobacco Use   • Smoking status: Former Smoker     Packs/day: 0.75     Years: 30.00     Pack years: 22.50     Types: Cigarettes     Quit date: 2022     Years since quittin.1   • Smokeless tobacco: Never Used   • Tobacco comment: last cigarette 4/15/2022   Vaping Use   • Vaping Use: Never used   Substance and Sexual Activity   • Alcohol use: Yes     Alcohol/week: 30.0 standard drinks     Types: 30 Cans of beer per week     Comment: Occasionally   • Drug use: No   • Sexual activity: Not Currently     Partners: Male     Birth control/protection: Tubal ligation       Medications:     Current Outpatient Medications:   •  Biotin 1000 MCG tablet, Take 1,000 mcg by  "mouth Daily., Disp: , Rfl:   •  cetirizine (zyrTEC) 10 MG tablet, Take 10 mg by mouth Daily., Disp: , Rfl:   •  cholecalciferol (VITAMIN D3) 25 MCG (1000 UT) tablet, Take 5,000 Units by mouth Daily., Disp: , Rfl:   •  cyclobenzaprine (FLEXERIL) 10 MG tablet, Take 1 tablet by mouth 3 (Three) Times a Day As Needed for Muscle Spasms., Disp: 30 tablet, Rfl: 0  •  estradiol (Vagifem) 10 MCG tablet vaginal tablet, Insert 1 tablet into the vagina 2 (Two) Times a Week., Disp: 24 tablet, Rfl: 3  •  FLUoxetine (PROzac) 20 MG capsule, Take 3 capsules by mouth Daily., Disp: , Rfl:   •  levothyroxine (SYNTHROID, LEVOTHROID) 50 MCG tablet, Take 1 tablet by mouth Daily., Disp: 90 tablet, Rfl: 2  •  omeprazole (priLOSEC) 20 MG capsule, Take 1 capsule by mouth Daily., Disp: 90 capsule, Rfl: 1  •  zolpidem (AMBIEN) 5 MG tablet, Take 1 tablet by mouth Every Night., Disp: , Rfl:   •  albuterol sulfate  (90 Base) MCG/ACT inhaler, Inhale 2 puffs Every 4 (Four) Hours As Needed for Wheezing or Shortness of Air., Disp: 8 g, Rfl: 1    Allergies:   No Known Allergies    Objective     Physical Exam:  Vital Signs:   Vitals:    05/24/22 1426   BP: 147/87   Pulse: 75   SpO2: 98%   Weight: 79 kg (174 lb 3.2 oz)   Height: 160 cm (63\")     Body mass index is 30.86 kg/m².    Physical Exam  Vitals and nursing note reviewed.   Constitutional:       General: She is not in acute distress.     Appearance: She is well-developed. She is not diaphoretic.   HENT:      Head: Normocephalic and atraumatic.      Comments: Mallampati 2 airway, webbed soft palate     Nose: Nose normal.      Mouth/Throat:      Pharynx: No oropharyngeal exudate.   Eyes:      General: No scleral icterus.        Right eye: No discharge.         Left eye: No discharge.      Pupils: Pupils are equal, round, and reactive to light.   Neck:      Thyroid: No thyromegaly.      Trachea: No tracheal deviation.   Cardiovascular:      Rate and Rhythm: Normal rate and regular rhythm.      " Heart sounds: Normal heart sounds. No murmur heard.    No friction rub. No gallop.   Pulmonary:      Effort: Pulmonary effort is normal. No respiratory distress.      Breath sounds: Normal breath sounds. No stridor. No wheezing or rales.   Abdominal:      Palpations: Abdomen is soft.   Musculoskeletal:         General: No tenderness.      Cervical back: Neck supple.      Right lower leg: No edema.      Left lower leg: No edema.      Comments: Clubbing: none   Lymphadenopathy:      Cervical: No cervical adenopathy.   Neurological:      Mental Status: She is alert and oriented to person, place, and time.      Cranial Nerves: No cranial nerve deficit.      Coordination: Coordination normal.   Psychiatric:         Behavior: Behavior normal.         Thought Content: Thought content normal.         Judgment: Judgment normal.         Results Review:   I reviewed the patient's new clinical results.  Lab Results   Component Value Date    TSH 0.842 04/22/2022       Assessment / Plan      Assessment:   Problem List Items Addressed This Visit        Mental Health    Alcohol use      Other Visit Diagnoses     Suspected sleep apnea    -  Primary    Relevant Orders    Home Sleep Study    Snoring        Elevated blood pressure reading        Obesity (BMI 30-39.9)        Chronic insomnia                Plan:   1.  Patient with complains of snoring, significant fatigue during the daytime and chronic insomnia.  She has Mallampati 2 airway with developed soft palate, loud snoring and dry mouth.All this put her at high risk of sleep disordered breathing.  Discussed at length about pathophysiology of sleep apnea.  Discussed side effects of untreated sleep apnea including poor quality sleep, cardiovascular, neurologic and metabolic side effects also discussed.  Further diagnostic testing recommended.  Patient is amenable to proceed with further testing and treatment as needed.  We discussed home study versus in lab study.  Patient is  amenable to proceeding with home-based testing after our discussion.  We will set her up for that and follow-up closely after.  Urged to keep her daily routine same while doing the sleep study.  She verbalized understanding.     2.  If found to have significant sleep apnea available treatment options discussed including CPAP therapy, oral appliance, surgical options and inspire therapy.  Weight loss as a treatment option for sleep apnea also discussed and counseled.  Patient is working on that aspect.     3.    Patient was commended on stopping smoking.  Also commended on stopping drinking alcohol as well.  She is working on that aspect and feels like she is doing well with that aspect.  She understands the importance of quitting alcohol.  Also advised not to mix alcohol with Ambien as that could potentiate sedative effects.    4.  Patient's blood pressure is mildly elevated today.  Advised to monitor at at work and if values remain above 140 systolic will need treatment.    5.  Patient has history of smoking and intermittent shortness of breath. Consider PFTs. CT chest for lung cancer screening.     Will follow closely after sleep study to go over the results and discuss further management options.  Alcohol cessation recommended and counseled strongly.      Follow Up:   After HST    Discussed plan of care in detail with patient today. Patient verbally understands and agrees. I spent 45 minutes on this date of service. This time includes time spent by me in the following activities:preparing for the visit, reviewing tests, obtaining and/or reviewing a separately obtained history, performing a medically appropriate examination, counseling the patient, ordering tests, or procedures, and/or documenting information in the medical record.       Price Noel MD  Pulmonary Critical Care and Sleep Medicine

## 2022-05-26 ENCOUNTER — APPOINTMENT (OUTPATIENT)
Dept: PULMONOLOGY | Facility: HOSPITAL | Age: 52
End: 2022-05-26

## 2022-05-26 ENCOUNTER — APPOINTMENT (OUTPATIENT)
Dept: CT IMAGING | Facility: HOSPITAL | Age: 52
End: 2022-05-26

## 2022-06-01 ENCOUNTER — APPOINTMENT (OUTPATIENT)
Dept: CT IMAGING | Facility: HOSPITAL | Age: 52
End: 2022-06-01

## 2022-06-23 ENCOUNTER — HOSPITAL ENCOUNTER (OUTPATIENT)
Dept: CT IMAGING | Facility: HOSPITAL | Age: 52
Discharge: HOME | End: 2022-06-23

## 2022-06-23 ENCOUNTER — HOSPITAL ENCOUNTER (OUTPATIENT)
Dept: MAMMOGRAPHY | Facility: HOSPITAL | Age: 52
Discharge: HOME | End: 2022-06-23

## 2022-06-23 DIAGNOSIS — Z12.31 VISIT FOR SCREENING MAMMOGRAM: ICD-10-CM

## 2022-06-23 DIAGNOSIS — Z87.891 PERSONAL HISTORY OF TOBACCO USE, PRESENTING HAZARDS TO HEALTH: ICD-10-CM

## 2022-06-23 PROCEDURE — 77067 SCR MAMMO BI INCL CAD: CPT

## 2022-06-23 PROCEDURE — 77067 SCR MAMMO BI INCL CAD: CPT | Performed by: RADIOLOGY

## 2022-06-23 PROCEDURE — 77063 BREAST TOMOSYNTHESIS BI: CPT | Performed by: RADIOLOGY

## 2022-06-23 PROCEDURE — 71271 CT THORAX LUNG CANCER SCR C-: CPT

## 2022-06-23 PROCEDURE — 77063 BREAST TOMOSYNTHESIS BI: CPT

## 2022-07-14 DIAGNOSIS — F33.41 RECURRENT MAJOR DEPRESSIVE DISORDER, IN PARTIAL REMISSION: ICD-10-CM

## 2022-07-14 DIAGNOSIS — F41.9 ANXIETY: ICD-10-CM

## 2022-07-14 RX ORDER — FLUOXETINE HYDROCHLORIDE 40 MG/1
CAPSULE ORAL
Qty: 90 CAPSULE | Refills: 0 | OUTPATIENT
Start: 2022-07-14

## 2022-07-19 DIAGNOSIS — N95.2 ATROPHY OF VAGINA: ICD-10-CM

## 2022-07-19 RX ORDER — ESTRADIOL 10 UG/1
1 INSERT VAGINAL 2 TIMES WEEKLY
Qty: 24 TABLET | Refills: 3 | Status: CANCELLED | OUTPATIENT
Start: 2022-07-21

## 2022-07-19 NOTE — TELEPHONE ENCOUNTER
Called [facility] pharmacy and they said last filled was by Eufemia Grimaldo for # [id number], she is not due until mid August, but states it was filled in April by you.

## 2022-07-19 NOTE — TELEPHONE ENCOUNTER
[facility] CALLED STATING THEY SENT A REFILL REQUEST ON THE 14TH OF July, AND STILL HAVEN'T RECEIVED WHAT THEY NEED FOR PT'S PROZAC.    [facility]: [phone]    PLEASE ADVISE

## 2022-07-21 RX ORDER — FLUOXETINE HYDROCHLORIDE 20 MG/1
40 CAPSULE ORAL DAILY
Qty: 60 CAPSULE | Refills: 0 | Status: SHIPPED | OUTPATIENT
Start: 2022-07-21 | End: 2022-09-06

## 2022-07-21 NOTE — TELEPHONE ENCOUNTER
REGGIE SPOKE W PT REGARDING HER PROZAC. SHE LET HER KNOW SHE NEEDED TO CONTACT [facility] FOR REFILLS, BUT PT STATED SHE IS NO LONGER SEEING [facility].    PT ALSO BEING PRESCRIBED AMBIEN BY [facility], WOULD SHE BE WILLING TO CONTINUE THAT? PT WILL SCHEDULE APPOINTMENT FOR CONTROLLED SUB AGREEMENT.

## 2022-08-31 ENCOUNTER — TELEPHONE (OUTPATIENT)
Dept: SLEEP MEDICINE | Facility: HOSPITAL | Age: 52
End: 2022-08-31

## 2022-08-31 NOTE — TELEPHONE ENCOUNTER
Caller: Mona Wilkerson    Relationship: Self    Best call back number: [phone]    What is the best time to reach you: ANYTIME    Who are you requesting to speak with (clinical staff, provider,  specific staff member): CLINICAL STAFF    What was the call regarding: PATIENT WOULD LIKE FOR SOMEONE IN OFFICE TO GIVE HER A CALL BACK. SHE WOULD LIKE TO KNOW IF DR. HENNESSY IS ABLE TO PRESCRIBE AMBIEN    Do you require a callback: YES          
PATIENT SCHEDULED FOR FOLLOW UP TO DISCUSS MEDICATION 08/31/22 C  
01-Nov-2018 09:38

## 2022-09-06 ENCOUNTER — OFFICE VISIT (OUTPATIENT)
Dept: OBSTETRICS AND GYNECOLOGY | Facility: CLINIC | Age: 52
End: 2022-09-06

## 2022-09-06 VITALS
DIASTOLIC BLOOD PRESSURE: 88 MMHG | SYSTOLIC BLOOD PRESSURE: 160 MMHG | WEIGHT: 170.8 LBS | HEIGHT: 63 IN | BODY MASS INDEX: 30.26 KG/M2

## 2022-09-06 DIAGNOSIS — N95.2 ATROPHY OF VAGINA: ICD-10-CM

## 2022-09-06 DIAGNOSIS — Z01.411 ENCOUNTER FOR GYNECOLOGICAL EXAMINATION WITH ABNORMAL FINDING: Primary | ICD-10-CM

## 2022-09-06 DIAGNOSIS — Z87.42 HISTORY OF ABNORMAL CERVICAL PAP SMEAR: ICD-10-CM

## 2022-09-06 PROCEDURE — 99396 PREV VISIT EST AGE 40-64: CPT | Performed by: NURSE PRACTITIONER

## 2022-09-06 RX ORDER — DEXTROAMPHETAMINE SACCHARATE, AMPHETAMINE ASPARTATE, DEXTROAMPHETAMINE SULFATE AND AMPHETAMINE SULFATE 5; 5; 5; 5 MG/1; MG/1; MG/1; MG/1
0.5 TABLET ORAL 2 TIMES DAILY
COMMUNITY
Start: 2022-08-22 | End: 2022-10-07

## 2022-09-06 RX ORDER — ESTRADIOL 10 UG/1
1 INSERT VAGINAL 2 TIMES WEEKLY
Qty: 24 TABLET | Refills: 3 | Status: SHIPPED | OUTPATIENT
Start: 2022-09-08

## 2022-09-06 RX ORDER — FLUOXETINE HYDROCHLORIDE 40 MG/1
0.5 CAPSULE ORAL DAILY
COMMUNITY
Start: 2022-07-22 | End: 2022-10-07

## 2022-09-06 NOTE — PROGRESS NOTES
Chief Complaint  Mona Wilkerson is a 52 y.o.  female presenting for Annual Exam (Repeat pap.  Last pap 2021 ASC, HR HPV neg.) and Med Refill (Vagifem 10 mcg (#24 with refills).  Anthony Jeronimo.)    History of Present Illness  Ramy is a very pleasant 53yo woman, , here for pap & annual gyn exam.  Her pap last year was ASCUS, neg for HR HPV.  She has signed an ABN Waiver, and wishes to get pap this year.  She has a PSHx of endometrial ablation / BTL.  She does NOT have a dx of HTN, but has just started Adderall, and she attributes the elevated BP reading today to that med.  Will follow BP carefully at work q d, and will stop the Adderall if BP stays up.  Hypothyroidism, compensated.  She stopped smoking in mid-April, and has not smoked since that time.  (This was after 30 years of smoking.  Praised for this great accomplishment!)  Mammo & colonoscopy up to date.  Otherwise, ROS neg.    The following portions of the patient's history were reviewed and updated as appropriate: allergies, current medications, past family history, past medical history, past social history, past surgical history and problem list.    No Known Allergies      Current Outpatient Medications:   •  [START ON 2022] estradiol (Vagifem) 10 MCG tablet vaginal tablet, Insert 1 tablet into the vagina 2 (Two) Times a Week., Disp: 24 tablet, Rfl: 3  •  amphetamine-dextroamphetamine (ADDERALL) 20 MG tablet, Take 0.5 tablets by mouth 2 (Two) Times a Day., Disp: , Rfl:   •  cetirizine (zyrTEC) 10 MG tablet, Take 10 mg by mouth Daily., Disp: , Rfl:   •  cholecalciferol (VITAMIN D3) 25 MCG (1000 UT) tablet, Take 5,000 Units by mouth Daily., Disp: , Rfl:   •  FLUoxetine (PROzac) 40 MG capsule, Take 1 capsule by mouth Daily., Disp: , Rfl:   •  levothyroxine (SYNTHROID, LEVOTHROID) 50 MCG tablet, Take 1 tablet by mouth Daily., Disp: 90 tablet, Rfl: 2  •  omeprazole (priLOSEC) 20 MG capsule, Take 1 capsule by mouth Daily., Disp: 90  "capsule, Rfl: 1  •  zolpidem (AMBIEN) 5 MG tablet, Take 1 tablet by mouth Every Night., Disp: , Rfl:     Past Medical History:   Diagnosis Date   • Abnormal Pap smear of cervix    • ADHD    • Cervical disc disorder    • Cyst of ovary 11/21/2016   • Depression    • Depression with anxiety 11/04/2016    seeing [facility] [place]- doing well  off lithium  changed to lamictal getting adhd testing   • GERD (gastroesophageal reflux disease)    • Hyperlipidemia 11/04/2016 26 Jan 2016  reviewed lipid panel with her    • Hypothyroidism    • Low back pain    • Lumbosacral disc disease    • Primary insomnia 11/21/2016   • Sleep difficulties         Past Surgical History:   Procedure Laterality Date   • COLONOSCOPY     • COLONOSCOPY W/ POLYPECTOMY  10/29/2021    Dr. Nice, diverticulosis and removal of tubular adenoma   • ENDOMETRIAL ABLATION W/ NOVASURE     • ESSURE TUBAL LIGATION     • EYE SURGERY  2002    Lasik   • LASIK Bilateral    • LIPOSUCTION     • RHINOPLASTY         Objective  /88   Ht 160 cm (63\")   Wt 77.5 kg (170 lb 12.8 oz)   Breastfeeding No   BMI 30.26 kg/m²     Physical Exam  Exam conducted with a chaperone present.   Constitutional:       Appearance: Normal appearance.   HENT:      Head: Normocephalic.   Neck:      Thyroid: No thyroid mass or thyromegaly.   Cardiovascular:      Rate and Rhythm: Normal rate and regular rhythm.      Heart sounds: Normal heart sounds.   Pulmonary:      Effort: Pulmonary effort is normal.      Breath sounds: Normal breath sounds.   Chest:   Breasts:      Right: No inverted nipple, mass, nipple discharge, axillary adenopathy or supraclavicular adenopathy.      Left: No inverted nipple, mass, nipple discharge, axillary adenopathy or supraclavicular adenopathy.       Abdominal:      Palpations: Abdomen is soft. There is no mass.      Tenderness: There is no abdominal tenderness.   Genitourinary:     General: Normal vulva.      Labia:         Right: No lesion.         Left: No lesion.  "      Vagina: Normal. No erythema.      Cervix: No discharge, lesion or erythema.      Uterus: Not enlarged and not tender.       Adnexa:         Right: No mass or tenderness.          Left: No mass or tenderness.        Comments: Anus appears wnl.  No rectal exam performed.  Lymphadenopathy:      Upper Body:      Right upper body: No supraclavicular or axillary adenopathy.      Left upper body: No supraclavicular or axillary adenopathy.   Neurological:      Mental Status: She is alert.         Assessment/Plan   Diagnoses and all orders for this visit:    1. Encounter for gynecological examination with abnormal finding (Primary)    2. History of abnormal cervical Pap smear    3. Atrophy of vagina  -     estradiol (Vagifem) 10 MCG tablet vaginal tablet; Insert 1 tablet into the vagina 2 (Two) Times a Week.  Dispense: 24 tablet; Refill: 3    Atrophy is well controlled with Vagifem.    Continue this med twice/ weekly.    Procedures    40 to 64: Counseling/Anticipatory Guidance Discussed: nutrition, physical activity, screenings and self-breast exam    Return in about 1 year (around 9/6/2023) for Annual physical.    Jen Ramos, APRN  09/06/2022

## 2022-09-09 LAB — REF LAB TEST METHOD: NORMAL

## 2022-10-07 ENCOUNTER — OFFICE VISIT (OUTPATIENT)
Dept: INTERNAL MEDICINE | Facility: CLINIC | Age: 52
End: 2022-10-07

## 2022-10-07 VITALS
SYSTOLIC BLOOD PRESSURE: 142 MMHG | HEART RATE: 74 BPM | TEMPERATURE: 97.3 F | OXYGEN SATURATION: 98 % | DIASTOLIC BLOOD PRESSURE: 86 MMHG | BODY MASS INDEX: 30.12 KG/M2 | WEIGHT: 170 LBS | HEIGHT: 63 IN

## 2022-10-07 DIAGNOSIS — Z78.9 ALCOHOL USE: ICD-10-CM

## 2022-10-07 DIAGNOSIS — I10 HYPERTENSION, UNSPECIFIED TYPE: Primary | ICD-10-CM

## 2022-10-07 DIAGNOSIS — F33.41 RECURRENT MAJOR DEPRESSIVE DISORDER, IN PARTIAL REMISSION: ICD-10-CM

## 2022-10-07 DIAGNOSIS — F90.9 ATTENTION DEFICIT HYPERACTIVITY DISORDER (ADHD), UNSPECIFIED ADHD TYPE: ICD-10-CM

## 2022-10-07 PROCEDURE — 99214 OFFICE O/P EST MOD 30 MIN: CPT | Performed by: INTERNAL MEDICINE

## 2022-10-07 RX ORDER — FLUOXETINE HYDROCHLORIDE 20 MG/1
20 CAPSULE ORAL DAILY
COMMUNITY
End: 2023-02-23 | Stop reason: SDUPTHER

## 2022-10-07 NOTE — PROGRESS NOTES
Internal Medicine Acute Visit    Chief Complaint   Patient presents with   • Hypertension        HPI  Ms. Wilkerson comes in today for HTN. She notes that she was started on adderall 10mg BID by psychiatry. She noted even prior to starting adderall her BP had been creeping upward. She notes that she was taking adderall inconsistently. She stopped adderall 2-3 weeks ago and recently noted BP readings at home decreasing from 150s/100s to 120-130s/80s. She had tried a nonstimulant option which was ineffective. Not using NSAIDs or decongestants. She is drinking a 12 pack over the weekend which is an improvement. She is hesitant to start any medication for BP.       Review of Systems  Review of Systems   Constitutional: Negative.    Respiratory: Negative.    Cardiovascular: Negative.    Psychiatric/Behavioral: Positive for decreased concentration. Negative for depressed mood (mood recently better after raise at work).        Medications  Current Outpatient Medications on File Prior to Visit   Medication Sig Dispense Refill   • amphetamine-dextroamphetamine (ADDERALL) 20 MG tablet Take 0.5 tablets by mouth 2 (Two) Times a Day.     • cetirizine (zyrTEC) 10 MG tablet Take 10 mg by mouth Daily.     • cholecalciferol (VITAMIN D3) 25 MCG (1000 UT) tablet Take 5,000 Units by mouth Daily.     • estradiol (Vagifem) 10 MCG tablet vaginal tablet Insert 1 tablet into the vagina 2 (Two) Times a Week. 24 tablet 3   • FLUoxetine (PROzac) 40 MG capsule Take 0.5 capsules by mouth Daily.     • levothyroxine (SYNTHROID, LEVOTHROID) 50 MCG tablet Take 1 tablet by mouth Daily. 90 tablet 2   • omeprazole (priLOSEC) 20 MG capsule Take 1 capsule by mouth Daily. 90 capsule 1   • zolpidem (AMBIEN) 5 MG tablet Take 1 tablet by mouth Every Night.       No current facility-administered medications on file prior to visit.        Allergies  No Known Allergies    PM  Past Medical History:   Diagnosis Date   • Abnormal Pap smear of cervix    • ADHD    •  "Cervical disc disorder    • Cyst of ovary 11/21/2016   • Depression    • Depression with anxiety 11/04/2016    seeing [facility]- doing well  off lithium  changed to lamictal getting adhd testing   • GERD (gastroesophageal reflux disease)    • Hyperlipidemia 11/04/2016 26 Jan 2016  reviewed lipid panel with her    • Hypothyroidism    • Low back pain    • Lumbosacral disc disease    • Primary insomnia 11/21/2016   • Sleep difficulties        Objective  Visit Vitals  /86   Pulse 74   Temp 97.3 °F (36.3 °C)   Ht 160 cm (63\")   Wt 77.1 kg (170 lb)   SpO2 98%   BMI 30.11 kg/m²        Physical Exam  Physical Exam  Vitals and nursing note reviewed.   Constitutional:       General: She is not in acute distress.     Appearance: She is well-developed. She is not ill-appearing or toxic-appearing.   HENT:      Head: Normocephalic and atraumatic.   Eyes:      Conjunctiva/sclera: Conjunctivae normal.   Cardiovascular:      Rate and Rhythm: Normal rate and regular rhythm.      Heart sounds: Normal heart sounds. No murmur heard.  Pulmonary:      Effort: Pulmonary effort is normal. No respiratory distress.      Breath sounds: Normal breath sounds.   Musculoskeletal:      Right lower leg: No edema.      Left lower leg: No edema.   Skin:     General: Skin is warm and dry.   Neurological:      Mental Status: She is alert and oriented to person, place, and time.   Psychiatric:         Mood and Affect: Mood is anxious.         Speech: Speech normal.         Behavior: Behavior normal.         Results  Results for orders placed or performed in visit on 09/06/22   LIQUID-BASED PAP SMEAR[facility] ([facility])    Specimen: ThinPrep Vial   Result Value Ref Range    Reference Lab Report       [facility]  [address] [place] [address]  Phone: [phone] or [phone]  Fax: [phone]  Abdullahi Fernandez M.D., Medical Director    PATIENT NAME                                     LABORATORY NO.  170   " REGGIE BRASHER GWENSusu                            [id number]  [id number]                                 AGE                    SEX   SSN              CLIENT REF #  [facility]             52        1970      F     [id number]      [id number]    [address]           REQUESTING MADRIANA.           ATTENDING M.D.         [place] [address]                        PB PABON  DATE COLLECTED            DATE RECEIVED          DATE REPORTED  2022    ThinPrep Pap with [facility]    DIAGNOSIS:  Negative for intraepithelial lesion or malignancy    Multiple factors can influence accuracy of Pap tests; therefore, screening at  regular  intervals is necessary for early cancer detection.      SPECIMEN ADEQUACY:  SATISFACTORY FOR EVALUATION  Transformation zone is present.  SOURCE OF SPECIMEN:       CERVICAL/ENDOCERVICAL  SLIDES:  1  CLINICAL HISTORY:  History of ASC  History of abnormal cervical Pap smear  Ablation, Atrophic Vagina    HPV  HR-HPV POOL: Negative    The Aptima HPV assay is an in vitro nucleic acid amplification test for the  qualitative detection of E6/E7 viral messenger RNA from 14 high risk types of  HPV in cervical specimens. The high risk HPV types detected include: 16, 18,  31, 33, 35, 39, 45, 51, 52, 56, 58, 59, 66, 68    CYTOTECHNOLOGIST:             HARDEEP MORALES (ASCP)    CPT CODES:  22171, 10700          Assessment and Plan  Diagnoses and all orders for this visit:    Hypertension, unspecified type  - Per chart review BP elevated intermittently since beginning of year and elevated in office today  - Her home readings are showing borderline readings in the 120-130s.  - Her alcohol use is likely contributing and adderall also likely worsened BP. She is now off adderall and cutting back on alcohol.  - She does not wish to start a medication today. I do worry that a medication will be necessary. I am also  concerned with her resuming adderall in the future however if BP is controlled this could be considered cautiously.  - follow up in 2-4 weeks with BP readings    Alcohol use  - cutting back, down to 12 pack over weekend    Recurrent major depressive disorder, in partial remission (HCC)  - following with Eufemia Grimaldo and has seen improved mood after recent raise. She is on prozac 20mg daily.    Attention deficit hyperactivity disorder (ADHD), unspecified ADHD type  - Has tried nonstimulant which was ineffective and more recently on adderall but with elevated BP. Following with Eufemia Grimaldo.    Health Maintenance  - Pap smear: 2021 neg cytology, neg HPV. Following with GYN.  - Mammogram: 2022 BIRADS 1  - Colonoscopy: 2021, repeat 5y  - Lung cancer screenin2022, repeat annually  - HCV: negative  - Immunizations: COVID booster discussed. Flu UTD. Tdap 2015. Shingrix complete.  - Depression screening: negative 2022    Return in about 2 weeks (around 10/21/2022) for Follow up HTN.

## 2022-10-24 ENCOUNTER — OFFICE VISIT (OUTPATIENT)
Dept: INTERNAL MEDICINE | Facility: CLINIC | Age: 52
End: 2022-10-24

## 2022-10-24 VITALS
TEMPERATURE: 97.7 F | WEIGHT: 172.6 LBS | HEIGHT: 63 IN | DIASTOLIC BLOOD PRESSURE: 98 MMHG | BODY MASS INDEX: 30.58 KG/M2 | SYSTOLIC BLOOD PRESSURE: 150 MMHG | HEART RATE: 75 BPM | OXYGEN SATURATION: 98 %

## 2022-10-24 DIAGNOSIS — I10 PRIMARY HYPERTENSION: Primary | ICD-10-CM

## 2022-10-24 PROBLEM — N18.31 STAGE 3A CHRONIC KIDNEY DISEASE: Status: RESOLVED | Noted: 2021-05-05 | Resolved: 2022-10-24

## 2022-10-24 PROCEDURE — 99214 OFFICE O/P EST MOD 30 MIN: CPT | Performed by: INTERNAL MEDICINE

## 2022-10-24 RX ORDER — AMLODIPINE BESYLATE 5 MG/1
5 TABLET ORAL DAILY
Qty: 90 TABLET | Refills: 1 | Status: SHIPPED | OUTPATIENT
Start: 2022-10-24 | End: 2022-11-21 | Stop reason: SDUPTHER

## 2022-10-24 NOTE — PROGRESS NOTES
"Internal Medicine Follow Up    Chief Complaint  Mona Wilkerson is a 52 y.o. female who presents today for follow up of chronic medical conditions outlined below.    Chief Complaint   Patient presents with   • Hypertension        HPI  Ms. Wilkerson comes in today for follow up on BP. BP remains ~140-150s/90s. She is ready to consider a BP medication. Denies edema. She notes that GYN exam in September was normal and requests for next pap to be done in this office.       Review of Systems  Review of Systems   Constitutional: Negative.    Respiratory: Negative.    Cardiovascular: Negative.         Current Medications  Current Outpatient Medications on File Prior to Visit   Medication Sig Dispense Refill   • cetirizine (zyrTEC) 10 MG tablet Take 10 mg by mouth Daily.     • cholecalciferol (VITAMIN D3) 25 MCG (1000 UT) tablet Take 5,000 Units by mouth Daily.     • estradiol (Vagifem) 10 MCG tablet vaginal tablet Insert 1 tablet into the vagina 2 (Two) Times a Week. 24 tablet 3   • FLUoxetine (PROzac) 20 MG capsule Take 20 mg by mouth Daily.     • levothyroxine (SYNTHROID, LEVOTHROID) 50 MCG tablet Take 1 tablet by mouth Daily. 90 tablet 2   • omeprazole (priLOSEC) 20 MG capsule Take 1 capsule by mouth Daily. 90 capsule 1   • zolpidem (AMBIEN) 5 MG tablet Take 1 tablet by mouth Every Night.       No current facility-administered medications on file prior to visit.       Allergies  No Known Allergies    Objective  Visit Vitals  /98   Pulse 75   Temp 97.7 °F (36.5 °C)   Ht 160 cm (63\")   Wt 78.3 kg (172 lb 9.6 oz)   SpO2 98% Comment: ra   BMI 30.57 kg/m²        Physical Exam  Physical Exam  Vitals and nursing note reviewed.   Constitutional:       General: She is not in acute distress.     Appearance: She is well-developed. She is not ill-appearing or toxic-appearing.   HENT:      Head: Normocephalic and atraumatic.   Eyes:      Conjunctiva/sclera: Conjunctivae normal.   Pulmonary:      Effort: Pulmonary effort is " normal. No respiratory distress.   Skin:     General: Skin is warm and dry.   Neurological:      Mental Status: She is alert and oriented to person, place, and time. Mental status is at baseline.         Results  Results for orders placed or performed in visit on 22   LIQUID-BASED PAP SMEAR[facility] ([facility])    Specimen: ThinPrep Vial   Result Value Ref Range    Reference Lab Report       [facility]  [address] [place] [address]  Phone: [phone] or [phone]  Fax: [phone]  Abdullahi Fernandez M.D., Medical Director    PATIENT NAME                                     LABORATORY NO.  REGGIE TABARES                            [id number]  [id number]                                 AGE                    SEX   SSN              CLIENT REF #  [facility]             52        1970      F     [id number]      [id number]    [address]           REQUESTING M.DI.           ATTENDING M.D.         COPY TO.  [address]  DATE COLLECTED            DATE RECEIVED          DATE REPORTED  2022    ThinPrep Pap with [facility]    DIAGNOSIS:  Negative for intraepithelial lesion or malignancy    Multiple factors can influence accuracy of Pap tests; therefore, screening at  regular  intervals is necessary for early cancer detection.      SPECIMEN ADEQUACY:  SATISFACTORY FOR EVALUATION  Transformation zone is present.  SOURCE OF SPECIMEN:       CERVICAL/ENDOCERVICAL  SLIDES:  1  CLINICAL HISTORY:  History of ASC  History of abnormal cervical Pap smear  Ablation, Atrophic Vagina    HPV  HR-HPV POOL: Negative    The Aptima HPV assay is an in vitro nucleic acid amplification test for the  qualitative detection of E6/E7 viral messenger RNA from 14 high risk types of  HPV in cervical specimens. The high risk HPV types detected  include: 16, 18,  31, 33, 35, 39, 45, 51, 52, 56, 58, 59, 66, 68    CYTOTECHNOLOGIST:             HARDEEP MORALES (ASCP)    CPT CODES:  99730, 13014          Assessment and Plan  Diagnoses and all orders for this visit:    Primary hypertension  - BP elevated consistently  - will start amlodipine 5mg daily  - follow up in 4-6 weeks    Health Maintenance  - Pap smear: 2022 neg cytology, neg HPV. Okay to do pap in this office if she chooses. Next .  - Mammogram: 2022 BIRADS 1  - Colonoscopy: 2021, repeat 5y  - Lung cancer screenin2022, repeat annually  - HCV: negative  - Immunizations: COVID booster discussed. Flu UTD. Tdap 2015. Shingrix complete.  - Depression screening: negative 2022    Return in about 4 weeks (around 2022) for Follow up HTN.  Answers for HPI/ROS submitted by the patient on 10/17/2022  What is the primary reason for your visit?: High Blood Pressure

## 2022-11-21 ENCOUNTER — OFFICE VISIT (OUTPATIENT)
Dept: INTERNAL MEDICINE | Facility: CLINIC | Age: 52
End: 2022-11-21

## 2022-11-21 VITALS
DIASTOLIC BLOOD PRESSURE: 80 MMHG | SYSTOLIC BLOOD PRESSURE: 134 MMHG | OXYGEN SATURATION: 98 % | WEIGHT: 173 LBS | HEIGHT: 63 IN | BODY MASS INDEX: 30.65 KG/M2 | TEMPERATURE: 97.6 F | HEART RATE: 83 BPM

## 2022-11-21 DIAGNOSIS — I10 PRIMARY HYPERTENSION: ICD-10-CM

## 2022-11-21 PROCEDURE — 99214 OFFICE O/P EST MOD 30 MIN: CPT | Performed by: INTERNAL MEDICINE

## 2022-11-21 RX ORDER — AMLODIPINE BESYLATE 10 MG/1
10 TABLET ORAL DAILY
Qty: 90 TABLET | Refills: 1 | Status: SHIPPED | OUTPATIENT
Start: 2022-11-21 | End: 2023-02-23

## 2022-11-21 NOTE — PROGRESS NOTES
"Internal Medicine Follow Up    Chief Complaint  Mona Wilkerson is a 52 y.o. female who presents today for follow up of chronic medical conditions outlined below.    Chief Complaint   Patient presents with   • Hypertension        HPI  Ms. Wilkerson comes in today for follow up on HTN. BP readings improved however she reports home readings 3 times per week still 140/90. She does not have her cuff with her today. She denies edema or other side effect from amlodipine.       Review of Systems  Review of Systems   Constitutional: Negative.    Respiratory: Negative.    Cardiovascular: Negative.         Current Medications  Current Outpatient Medications on File Prior to Visit   Medication Sig Dispense Refill   • cetirizine (zyrTEC) 10 MG tablet Take 10 mg by mouth Daily.     • cholecalciferol (VITAMIN D3) 25 MCG (1000 UT) tablet Take 5,000 Units by mouth Daily.     • estradiol (Vagifem) 10 MCG tablet vaginal tablet Insert 1 tablet into the vagina 2 (Two) Times a Week. 24 tablet 3   • FLUoxetine (PROzac) 20 MG capsule Take 20 mg by mouth Daily.     • levothyroxine (SYNTHROID, LEVOTHROID) 50 MCG tablet Take 1 tablet by mouth Daily. 90 tablet 2   • omeprazole (priLOSEC) 20 MG capsule Take 1 capsule by mouth Daily. 90 capsule 1   • zolpidem (AMBIEN) 5 MG tablet Take 1 tablet by mouth Every Night.     • [DISCONTINUED] amLODIPine (NORVASC) 5 MG tablet Take 1 tablet by mouth Daily. 90 tablet 1     No current facility-administered medications on file prior to visit.       Allergies  No Known Allergies    Objective  Visit Vitals  /80   Pulse 83   Temp 97.6 °F (36.4 °C)   Ht 160 cm (63\")   Wt 78.5 kg (173 lb)   SpO2 98%   BMI 30.65 kg/m²        Physical Exam  Physical Exam  Vitals and nursing note reviewed.   Constitutional:       General: She is not in acute distress.     Appearance: She is well-developed. She is not ill-appearing or toxic-appearing.   HENT:      Head: Normocephalic and atraumatic.   Eyes:      " Conjunctiva/sclera: Conjunctivae normal.   Pulmonary:      Effort: Pulmonary effort is normal. No respiratory distress.   Skin:     General: Skin is warm and dry.   Neurological:      Mental Status: She is alert and oriented to person, place, and time. Mental status is at baseline.   Psychiatric:         Mood and Affect: Mood normal.         Behavior: Behavior normal.         Results  Results for orders placed or performed in visit on 22   LIQUID-BASED PAP SMEAR[facility] ([facility])    Specimen: ThinPrep Vial   Result Value Ref Range    Reference Lab Report       [facility]  [address] [place] [address]  Phone: [phone] or [phone]  Fax: [phone]  Abdullahi Fernandez M.D., Medical Director    PATIENT NAME                                     LABORATORY NO.  170   REGGIE BRASHER                            [id number]  [id number]                                 AGE                    SEX   SSN              CLIENT REF #  [facility]             52        1970      F     [id number]      [id number]    [address]           REQUESTING M.D.           ATTENDING M.D.         COPY TO.  [address]  DATE COLLECTED            DATE RECEIVED          DATE REPORTED  2022    ThinPrep Pap with [facility]    DIAGNOSIS:  Negative for intraepithelial lesion or malignancy    Multiple factors can influence accuracy of Pap tests; therefore, screening at  regular  intervals is necessary for early cancer detection.      SPECIMEN ADEQUACY:  SATISFACTORY FOR EVALUATION  Transformation zone is present.  SOURCE OF SPECIMEN:       CERVICAL/ENDOCERVICAL  SLIDES:  1  CLINICAL HISTORY:  History of ASC  History of abnormal cervical Pap smear  Ablation, Atrophic Vagina    HPV  HR-HPV POOL: Negative    The Aptima HPV assay is an in vitro nucleic  acid amplification test for the  qualitative detection of E6/E7 viral messenger RNA from 14 high risk types of  HPV in cervical specimens. The high risk HPV types detected include: 16, 18,  31, 33, 35, 39, 45, 51, 52, 56, 58, 59, 66, 68    CYTOTECHNOLOGIST:             HARDEEP MORALES (ASCP)    CPT CODES:  71935, 40250          Assessment and Plan  Diagnoses and all orders for this visit:    Primary hypertension  - BP improved but still elevated on home readings. Unfortunately did not bring home BP cuff today for comparison.  - regardless there is room for amlodipine to be increased. Will increase to 10mg daily.  - recheck in 3 months but also encourage her to have nurse visit for BP check and to compare home cuff.     Health Maintenance  - Pap smear: 2022 neg cytology, neg HPV. Okay to do pap in this office if she chooses. Next .  - Mammogram: 2022 BIRADS 1  - Colonoscopy: 2021, repeat 5y  - Lung cancer screenin2022, repeat annually  - HCV: negative  - Immunizations: COVID booster discussed. Flu UTD. Tdap 2015. Shingrix complete.  - Depression screening: negative 2022    Return in about 3 months (around 2023) for Follow up HTN.  Answers for HPI/ROS submitted by the patient on 2022  What is the primary reason for your visit?: High Blood Pressure

## 2022-11-28 ENCOUNTER — OFFICE VISIT (OUTPATIENT)
Dept: INTERNAL MEDICINE | Facility: CLINIC | Age: 52
End: 2022-11-28

## 2022-11-28 VITALS
DIASTOLIC BLOOD PRESSURE: 82 MMHG | SYSTOLIC BLOOD PRESSURE: 140 MMHG | OXYGEN SATURATION: 99 % | WEIGHT: 174.4 LBS | BODY MASS INDEX: 30.9 KG/M2 | HEART RATE: 83 BPM | HEIGHT: 63 IN

## 2022-11-28 DIAGNOSIS — Z78.9 ALCOHOL USE: Primary | ICD-10-CM

## 2022-11-28 DIAGNOSIS — R45.89 CRYING: ICD-10-CM

## 2022-11-28 PROCEDURE — 99213 OFFICE O/P EST LOW 20 MIN: CPT | Performed by: NURSE PRACTITIONER

## 2022-11-28 RX ORDER — NALTREXONE HYDROCHLORIDE 50 MG/1
50 TABLET, FILM COATED ORAL DAILY
Qty: 30 TABLET | Refills: 0 | Status: SHIPPED | OUTPATIENT
Start: 2022-11-28 | End: 2022-12-28

## 2022-11-28 RX ORDER — DISULFIRAM 250 MG/1
250 TABLET ORAL DAILY
Qty: 14 TABLET | Refills: 0 | Status: SHIPPED | OUTPATIENT
Start: 2022-11-28 | End: 2023-02-23

## 2022-11-28 NOTE — PROGRESS NOTES
Office Note     Name: Mona Wilkerson    : 1970     MRN: [id number]     Chief Complaint  Alcohol Problem    Subjective     History of Present Illness:  Mona Wilkerson is a 52 y.o. female who presents today for concerns of alcohol abuse.  Patient states she has been using alcohol for the last 5 years.  In 2019 she did go into inpatient rehab.  She states she stayed about a week.  She has continued her alcohol use since then.  She averages about 8 beers a night.  She states she gets the taller beers, 4 per night.  She does not drink on the job.  She states she is tired of it as she is taking care of of family members at home and wants to make sure she is within fully.  Patient does see a psychiatrist related to insomnia as well as ADHD.  Patient ideally would not like to start on any additional medication.  She thinks she has tried naltrexone in the past but only took 1 dose.  Patient was visibly upset as we discussed alcohol use today.  Patient has discussed her alcohol use with her supervisor and HR.  They are aware.  Patient is seeking outpatient treatment from [facility]    Review of Systems   Constitutional: Negative for chills, fatigue and fever.        Alcohol use   HENT: Negative for sore throat.    Eyes: Negative for visual disturbance.   Respiratory: Negative for cough and shortness of breath.    Cardiovascular: Negative for chest pain.   Gastrointestinal: Negative for abdominal pain.   Skin: Negative for color change.   Allergic/Immunologic: Negative for immunocompromised state.   Neurological: Negative for headaches.   Psychiatric/Behavioral: Negative for behavioral problems.       Past Medical History:   Diagnosis Date   • Abnormal Pap smear of cervix    • ADHD    • Cervical disc disorder    • Cyst of ovary 2016   • Depression    • Depression with anxiety 2016    seeing [facility] [place]- doing well  off lithium  changed to lamictal getting adhd testing   • GERD (gastroesophageal reflux  disease)    • Hyperlipidemia 2016  reviewed lipid panel with her    • Hypertension 10/7/2022   • Hypothyroidism    • Low back pain    • Lumbosacral disc disease    • Primary insomnia 2016   • Sleep difficulties    • Stage 3a chronic kidney disease (HCC) 2021       Past Surgical History:   Procedure Laterality Date   • COLONOSCOPY     • COLONOSCOPY W/ POLYPECTOMY  10/29/2021    Dr. Nice, diverticulosis and removal of tubular adenoma   • ENDOMETRIAL ABLATION W/ NOVASURE     • ESSURE TUBAL LIGATION     • EYE SURGERY      Lasik   • LASIK Bilateral    • LIPOSUCTION     • RHINOPLASTY         Social History     Socioeconomic History   • Marital status:    Tobacco Use   • Smoking status: Former     Packs/day: 0.75     Years: 30.00     Pack years: 22.50     Types: Cigarettes     Quit date: 2022     Years since quittin.6   • Smokeless tobacco: Never   • Tobacco comments:     last cigarette 4/15/2022   Vaping Use   • Vaping Use: Never used   Substance and Sexual Activity   • Alcohol use: Yes     Alcohol/week: 12.0 standard drinks     Types: 12 Standard drinks or equivalent per week     Comment: 6 each weekend day   • Drug use: No   • Sexual activity: Not Currently     Partners: Male     Birth control/protection: Essure     Comment: Essure         Current Outpatient Medications:   •  amLODIPine (NORVASC) 10 MG tablet, Take 1 tablet by mouth Daily., Disp: 90 tablet, Rfl: 1  •  cetirizine (zyrTEC) 10 MG tablet, Take 10 mg by mouth Daily., Disp: , Rfl:   •  cholecalciferol (VITAMIN D3) 25 MCG (1000 UT) tablet, Take 5,000 Units by mouth Daily., Disp: , Rfl:   •  estradiol (Vagifem) 10 MCG tablet vaginal tablet, Insert 1 tablet into the vagina 2 (Two) Times a Week., Disp: 24 tablet, Rfl: 3  •  FLUoxetine (PROzac) 20 MG capsule, Take 20 mg by mouth Daily., Disp: , Rfl:   •  levothyroxine (SYNTHROID, LEVOTHROID) 50 MCG tablet, Take 1 tablet by mouth Daily., Disp: 90 tablet, Rfl:  "2  •  omeprazole (priLOSEC) 20 MG capsule, Take 1 capsule by mouth Daily., Disp: 90 capsule, Rfl: 1  •  zolpidem (AMBIEN) 5 MG tablet, Take 1 tablet by mouth Every Night., Disp: , Rfl:   •  disulfiram (Antabuse) 250 MG tablet, Take 1 tablet by mouth Daily for 14 days., Disp: 14 tablet, Rfl: 0  •  naltrexone (DEPADE) 50 MG tablet, Take 1 tablet by mouth Daily for 30 days., Disp: 30 tablet, Rfl: 0    Objective     Vital Signs  /82   Pulse 83   Ht 160 cm (63\")   Wt 79.1 kg (174 lb 6.4 oz)   SpO2 99%   BMI 30.89 kg/m²   Estimated body mass index is 30.89 kg/m² as calculated from the following:    Height as of this encounter: 160 cm (63\").    Weight as of this encounter: 79.1 kg (174 lb 6.4 oz).    BMI is >= 30 and <35. (Class 1 Obesity). The following options were offered after discussion;: Will address at next visit        Physical Exam  Vitals and nursing note reviewed.   Constitutional:       Appearance: Normal appearance.   HENT:      Head: Normocephalic and atraumatic.   Eyes:      Extraocular Movements: Extraocular movements intact.      Pupils: Pupils are equal, round, and reactive to light.   Cardiovascular:      Rate and Rhythm: Normal rate and regular rhythm.      Pulses: Normal pulses.      Heart sounds: Normal heart sounds.   Pulmonary:      Effort: Pulmonary effort is normal.      Breath sounds: Normal breath sounds.   Musculoskeletal:         General: Normal range of motion.   Skin:     General: Skin is warm and dry.   Neurological:      Mental Status: She is alert and oriented to person, place, and time.   Psychiatric:         Mood and Affect: Mood normal.         Behavior: Behavior normal.      Comments: Visibly upset today                 Assessment and Plan     Diagnoses and all orders for this visit:    1. Alcohol use (Primary)  -     naltrexone (DEPADE) 50 MG tablet; Take 1 tablet by mouth Daily for 30 days.  Dispense: 30 tablet; Refill: 0  -     disulfiram (Antabuse) 250 MG tablet; Take 1 " tablet by mouth Daily for 14 days.  Dispense: 14 tablet; Refill: 0    2. Crying    Plan  Discussed with patient that I will send in a limited supply of naltrexone.  I do understand if she does not want to start taking this medication but I wanted to make sure she had this available to her.  I will also start her on Antabuse to take 1 tablet by mouth for the next 2 weeks.  We did discuss the significant side effects of drinking on this medication.    We will plan to follow-up in 2 weeks    Will discuss possible Vivitrol options with Dr. Huitron.    Go to ER if any condition worsens or severe    I would highly recommend she consider just having a counselor along with a psychiatrist.  Patient is going to seek outpatient treatment at [facility]    Patient has discussed her concerns with her job.  Patient requested a work note.    Follow Up  Return for 2 week follow up on alcohol use- AD.    MARIA L Quinones    Part of this note may be an electronic transcription/translation of spoken language to printed text using the Dragon Dictation System.

## 2022-12-12 ENCOUNTER — TELEPHONE (OUTPATIENT)
Dept: INTERNAL MEDICINE | Facility: CLINIC | Age: 52
End: 2022-12-12

## 2022-12-12 NOTE — TELEPHONE ENCOUNTER
**HUB TO READ**    CALLING PATIENT TO RESCHEDULE THE FOLLOWING APPOINTMENT WITH DR. PERSAUD:  12/15/2022 AT 10:45AM    LVM FOR PATIENT.    RESCHEDULED PATIENT WITH MARIA L VILLEDA 12/15/2022 AT 10:45AM.    PLEASE RESCHEDULE IF NEEDED.    THANKS

## 2023-01-03 DIAGNOSIS — K21.9 GERD WITHOUT ESOPHAGITIS: Chronic | ICD-10-CM

## 2023-01-03 RX ORDER — OMEPRAZOLE 20 MG/1
20 CAPSULE, DELAYED RELEASE ORAL DAILY
Qty: 90 CAPSULE | Refills: 1 | Status: SHIPPED | OUTPATIENT
Start: 2023-01-03

## 2023-01-03 NOTE — TELEPHONE ENCOUNTER
Caller: Mona Wilkerson    Relationship: Self    Best call back number: [phone]     Requested Prescriptions:   Requested Prescriptions     Pending Prescriptions Disp Refills   • omeprazole (priLOSEC) 20 MG capsule 90 capsule 1     Sig: Take 1 capsule by mouth Daily.        Pharmacy where request should be sent: [id number] [place] [facility] [address] [facility] - [phone]  - [phone] FX     Additional details provided by patient: PATIENT HAS CALLED REQUESTING NEW 90 DAY PRESCRIPTION WITH REFILLS ON ABOVE MEDICATION    Does the patient have less than a 3 day supply:  [] Yes  [x] No    Would you like a call back once the refill request has been completed: [x] Yes [] No    If the office needs to give you a call back, can they leave a voicemail: [x] Yes [] No    Panfilo Cleary   01/03/23 14:38 EST

## 2023-02-23 ENCOUNTER — OFFICE VISIT (OUTPATIENT)
Dept: INTERNAL MEDICINE | Facility: CLINIC | Age: 53
End: 2023-02-23
Payer: COMMERCIAL

## 2023-02-23 VITALS
BODY MASS INDEX: 31.36 KG/M2 | HEIGHT: 63 IN | OXYGEN SATURATION: 98 % | DIASTOLIC BLOOD PRESSURE: 96 MMHG | WEIGHT: 177 LBS | HEART RATE: 69 BPM | TEMPERATURE: 97.4 F | SYSTOLIC BLOOD PRESSURE: 150 MMHG

## 2023-02-23 DIAGNOSIS — E03.9 ACQUIRED HYPOTHYROIDISM: Chronic | ICD-10-CM

## 2023-02-23 DIAGNOSIS — F99 INSOMNIA DUE TO OTHER MENTAL DISORDER: ICD-10-CM

## 2023-02-23 DIAGNOSIS — Z78.9 ALCOHOL USE: Primary | ICD-10-CM

## 2023-02-23 DIAGNOSIS — F51.05 INSOMNIA DUE TO OTHER MENTAL DISORDER: ICD-10-CM

## 2023-02-23 DIAGNOSIS — F33.41 RECURRENT MAJOR DEPRESSIVE DISORDER, IN PARTIAL REMISSION: ICD-10-CM

## 2023-02-23 DIAGNOSIS — J34.89 NASAL SORE: ICD-10-CM

## 2023-02-23 DIAGNOSIS — F41.9 ANXIETY: ICD-10-CM

## 2023-02-23 DIAGNOSIS — I10 PRIMARY HYPERTENSION: ICD-10-CM

## 2023-02-23 PROCEDURE — 99214 OFFICE O/P EST MOD 30 MIN: CPT | Performed by: INTERNAL MEDICINE

## 2023-02-23 RX ORDER — MUPIROCIN CALCIUM 20 MG/G
1 CREAM TOPICAL 3 TIMES DAILY
Qty: 30 G | Refills: 0 | OUTPATIENT
Start: 2023-02-23

## 2023-02-23 RX ORDER — LISINOPRIL 20 MG/1
20 TABLET ORAL DAILY
Qty: 90 TABLET | Refills: 3 | Status: SHIPPED | OUTPATIENT
Start: 2023-02-23

## 2023-02-23 RX ORDER — TRAZODONE HYDROCHLORIDE 100 MG/1
50-100 TABLET ORAL NIGHTLY PRN
COMMUNITY

## 2023-02-23 RX ORDER — AMLODIPINE BESYLATE 5 MG/1
5 TABLET ORAL DAILY
Qty: 90 TABLET | Refills: 3 | Status: SHIPPED | OUTPATIENT
Start: 2023-02-23

## 2023-02-23 RX ORDER — MUPIROCIN CALCIUM 20 MG/G
1 CREAM TOPICAL 3 TIMES DAILY
Qty: 30 G | Refills: 0 | Status: SHIPPED | OUTPATIENT
Start: 2023-02-23 | End: 2023-03-02

## 2023-02-23 RX ORDER — AMLODIPINE BESYLATE 5 MG/1
TABLET ORAL
COMMUNITY
Start: 2023-01-21 | End: 2023-02-23 | Stop reason: SDUPTHER

## 2023-02-23 RX ORDER — LEVOTHYROXINE SODIUM 0.05 MG/1
50 TABLET ORAL DAILY
Qty: 90 TABLET | Refills: 3 | Status: SHIPPED | OUTPATIENT
Start: 2023-02-23

## 2023-02-23 RX ORDER — FLUOXETINE HYDROCHLORIDE 20 MG/1
20 CAPSULE ORAL DAILY
Qty: 90 CAPSULE | Refills: 1 | Status: SHIPPED | OUTPATIENT
Start: 2023-02-23

## 2023-02-23 NOTE — PROGRESS NOTES
Internal Medicine Follow Up    Chief Complaint  Mona Wilkerson is a 52 y.o. female who presents today for follow up of chronic medical conditions outlined below.    Chief Complaint   Patient presents with   • Hypertension   • Alcohol Problem        HPI  Ms. Wilkerson comes in today for follow up. She is doing well overall. She has cut back on alcohol intake to 2-4 drinks twice a week. She has done so on her own. Not seeing a therapist and not taking the naltrexone or antabuse sent last visit. She notes that she lives with her parents who are supportive. Her father was an alcoholic and sober x30y. She did try increasing amlodipine but developed edema so back on 5mg daily. She reports BP readings at home are similar to today's office reading. She is no longer seeing Eufemia Grimaldo because she has left her previous practice and no longer takes insurance. Mood is stable on prozac 20mg daily. No longer using ambien but has trazodone 100mg tabs on hand. She notes hx of nasal sores which were previously diagnosed as staph by another provider. She is out of mupirocin and needs refill.       Review of Systems  Review of Systems   Constitutional: Negative.    HENT:        +sore in nose   Respiratory: Negative.    Cardiovascular: Positive for leg swelling (now resolved).   Psychiatric/Behavioral: Positive for sleep disturbance. Negative for depressed mood. The patient is not nervous/anxious.         Current Medications  Current Outpatient Medications on File Prior to Visit   Medication Sig Dispense Refill   • estradiol (Vagifem) 10 MCG tablet vaginal tablet Insert 1 tablet into the vagina 2 (Two) Times a Week. 24 tablet 3   • omeprazole (priLOSEC) 20 MG capsule Take 1 capsule by mouth Daily. 90 capsule 1   • traZODone (DESYREL) 100 MG tablet Take  mg by mouth At Night As Needed.     • [DISCONTINUED] amLODIPine (NORVASC) 5 MG tablet      • [DISCONTINUED] FLUoxetine (PROzac) 20 MG capsule Take 20 mg by mouth Daily.  "    • [DISCONTINUED] levothyroxine (SYNTHROID, LEVOTHROID) 50 MCG tablet Take 1 tablet by mouth Daily. 90 tablet 2   • [DISCONTINUED] zolpidem (AMBIEN) 5 MG tablet Take 1 tablet by mouth Every Night.     • [DISCONTINUED] amLODIPine (NORVASC) 10 MG tablet Take 1 tablet by mouth Daily. 90 tablet 1   • [DISCONTINUED] cetirizine (zyrTEC) 10 MG tablet Take 10 mg by mouth Daily.     • [DISCONTINUED] cholecalciferol (VITAMIN D3) 25 MCG (1000 UT) tablet Take 5,000 Units by mouth Daily.     • [DISCONTINUED] disulfiram (Antabuse) 250 MG tablet Take 1 tablet by mouth Daily for 14 days. 14 tablet 0     No current facility-administered medications on file prior to visit.       Allergies  No Known Allergies    Objective  Visit Vitals  /96   Pulse 69   Temp 97.4 °F (36.3 °C)   Ht 160 cm (63\")   Wt 80.3 kg (177 lb)   SpO2 98%   BMI 31.35 kg/m²        Physical Exam  Physical Exam  Vitals and nursing note reviewed.   Constitutional:       General: She is not in acute distress.     Appearance: She is well-developed. She is not ill-appearing or toxic-appearing.   HENT:      Head: Normocephalic and atraumatic.   Eyes:      Conjunctiva/sclera: Conjunctivae normal.   Pulmonary:      Effort: Pulmonary effort is normal. No respiratory distress.   Musculoskeletal:      Right lower leg: No edema.      Left lower leg: No edema.   Skin:     General: Skin is warm and dry.   Neurological:      Mental Status: She is alert and oriented to person, place, and time. Mental status is at baseline.      Gait: Gait normal.   Psychiatric:         Mood and Affect: Mood normal.         Behavior: Behavior normal.         Results  Results for orders placed or performed in visit on 09/06/22   LIQUID-BASED PAP SMEAR, [facility] ([facility])    Specimen: ThinPrep Vial   Result Value Ref Range    Reference Lab Report       [facility]  [address] [place] [address]  Phone: [phone] or [phone]  Fax: " [phone]  Abdullahi Fernandez M.D., Medical Director    PATIENT NAME                                     LABORATORY NO.  [id number]   REGGIE BRASHER                            [id number]  [id number]                                 AGE                    SEX   SSN              CLIENT REF #  [facility]             52        1970      F     [id number]      [id number]    [address]           REQUESTING MADRIANA.           ATTENDING M.D.         COPY TO.  [address] [place]  DATE COLLECTED            DATE RECEIVED          DATE REPORTED  2022    ThinPrep Pap with [facility]    DIAGNOSIS:  Negative for intraepithelial lesion or malignancy    Multiple factors can influence accuracy of Pap tests; therefore, screening at  regular  intervals is necessary for early cancer detection.      SPECIMEN ADEQUACY:  SATISFACTORY FOR EVALUATION  Transformation zone is present.  SOURCE OF SPECIMEN:       CERVICAL/ENDOCERVICAL  SLIDES:  1  CLINICAL HISTORY:  History of ASC  History of abnormal cervical Pap smear  Ablation, Atrophic Vagina    HPV  HR-HPV POOL: Negative    The Aptima HPV assay is an in vitro nucleic acid amplification test for the  qualitative detection of E6/E7 viral messenger RNA from 14 high risk types of  HPV in cervical specimens. The high risk HPV types detected include: 16, 18,  31, 33, 35, 39, 45, 51, 52, 56, 58, 59, 66, 68    CYTOTECHNOLOGIST:             HARDEEP MORALES (ASCP)    CPT CODES:  26866, 02688          Assessment and Plan  Diagnoses and all orders for this visit:    Alcohol use  - has cut back to 2-4 drinks twice a week  - has not started naltrexone or antabuse. Not seeing a substance abuse counselor or treatment center.  - encouraged her to continue efforts to cut back    Primary hypertension  - BP not controlled.  - did not tolerate higher dose of amlodipine due to  edema. Will continue amlodipine 5mg daily.  - start lisinopril 20mg daily  - BMP in 1 week    Anxiety and Recurrent major depressive disorder, in partial remission (HCC)  - No longer seeing Eufemia Grimaldo  - mood has been stable so I will take over management.   - Continue prozac 20mg daily.    Insomnia due to other mental disorder  - no longer seeing her psychiatrist so she is stopping ambien  - has trazodone 50-100mg qhs PRN    Acquired hypothyroidism  - continue levothyroxine 50mcg daily and update TSH    Nasal sore  - previously diagnosed as staph and treated with mupirocin  - will send refill of mupirocin to pharmacy      Health Maintenance  - Pap smear: 2022 neg cytology, neg HPV. Okay to do pap in this office if she chooses. Next .  - Mammogram: 2022 BIRADS 1  - Colonoscopy: 2021, repeat 5y  - Lung cancer screenin2022, repeat annually  - HCV: negative  - Immunizations: COVID UTD. Flu UTD. Tdap . Shingrix complete.  - Depression screening: negative 2022    Return for Next scheduled follow up.  Answers for HPI/ROS submitted by the patient on 2023  What is the primary reason for your visit?: High Blood Pressure

## 2023-02-23 NOTE — TELEPHONE ENCOUNTER
Caller: [id number] - [place], [place] - [address] - [phone] [facility] [phone] FX    Relationship: Pharmacy    Best call back number: [phone]    Requested Prescriptions:   Requested Prescriptions     Pending Prescriptions Disp Refills   • mupirocin (Bactroban) 2 % cream 30 g 0     Sig: Apply 1 application topically to the appropriate area as directed 3 (Three) Times a Day.      Pharmacy where request should be sent: [id number] - [place] - [address] - [phone] [facility] [phone] FX     Additional details provided by patient: PHARMACY STATES THEY'RE OUT OF THIS, THEY CAN ORDER IT OR SWITCH IT TO SOMETHING ELSE.    Does the patient have less than a 3 day supply:  [] Yes  [x] No    Would you like a call back once the refill request has been completed: [x] Yes [] No    If the office needs to give you a call back, can they leave a voicemail: [x] Yes [] No    Lamin Castillo Rep   02/23/23 15:31 EST

## 2023-02-27 ENCOUNTER — TELEPHONE (OUTPATIENT)
Dept: INTERNAL MEDICINE | Facility: CLINIC | Age: 53
End: 2023-02-27
Payer: COMMERCIAL

## 2023-02-27 NOTE — TELEPHONE ENCOUNTER
Caller: [id number] - [place], [place] - [address] - [phone]  - [phone] FX    Relationship: Pharmacy        What was the call regarding: mupirocin (Bactroban) 2 % cream  PHARMACY CARRIES THE OINTMENT, DO THE DIRECTIONS NEED TO BE CHANGED IF  APPROVES OINTMENT?    Do you require a callback: YES

## 2023-03-02 ENCOUNTER — TELEPHONE (OUTPATIENT)
Dept: INTERNAL MEDICINE | Facility: CLINIC | Age: 53
End: 2023-03-02
Payer: COMMERCIAL

## 2023-03-02 DIAGNOSIS — J34.89 NASAL SORE: Primary | ICD-10-CM

## 2023-03-02 NOTE — TELEPHONE ENCOUNTER
Pharmacy Name: [facility][id number] - [place], [place] - [address] - [phone] [facility] [phone]      Pharmacy representative name: ZEV     Pharmacy representative phone number: [phone]    What medication are you calling in regards to: BUPIRISON CREAM     What question does the pharmacy have: THE PHARMACY STATES THAT THE CREAM  IS NOT COVERED AND THE MEDICATION .00 BUT THE OINTMENT IS COVERED AND IT COULD POSSIBLY BE CHEAPER THE PHARMACY WOULD LIKE TO KNOW IF THEY CAN GET A PRESCRIPTION FOR THE OINTMENT     Who is the provider that prescribed the medication: DOCTOR PERSAUD

## 2023-04-05 ENCOUNTER — LAB (OUTPATIENT)
Dept: LAB | Facility: HOSPITAL | Age: 53
End: 2023-04-05
Payer: COMMERCIAL

## 2023-04-05 DIAGNOSIS — I10 PRIMARY HYPERTENSION: ICD-10-CM

## 2023-04-05 DIAGNOSIS — E03.9 ACQUIRED HYPOTHYROIDISM: Chronic | ICD-10-CM

## 2023-04-05 LAB
ANION GAP SERPL CALCULATED.3IONS-SCNC: 10.9 MMOL/L (ref 5–15)
BUN SERPL-MCNC: 9 MG/DL (ref 6–20)
BUN/CREAT SERPL: 8 (ref 7–25)
CALCIUM SPEC-SCNC: 9.6 MG/DL (ref 8.6–10.5)
CHLORIDE SERPL-SCNC: 97 MMOL/L (ref 98–107)
CO2 SERPL-SCNC: 28.1 MMOL/L (ref 22–29)
CREAT SERPL-MCNC: 1.12 MG/DL (ref 0.57–1)
EGFRCR SERPLBLD CKD-EPI 2021: 58.6 ML/MIN/1.73
GLUCOSE SERPL-MCNC: 84 MG/DL (ref 65–99)
POTASSIUM SERPL-SCNC: 3.8 MMOL/L (ref 3.5–5.2)
SODIUM SERPL-SCNC: 136 MMOL/L (ref 136–145)
TSH SERPL DL<=0.05 MIU/L-ACNC: 1.06 UIU/ML (ref 0.27–4.2)

## 2023-04-05 PROCEDURE — 80048 BASIC METABOLIC PNL TOTAL CA: CPT

## 2023-04-05 PROCEDURE — 84443 ASSAY THYROID STIM HORMONE: CPT

## 2023-04-16 DIAGNOSIS — N17.9 AKI (ACUTE KIDNEY INJURY): Primary | ICD-10-CM

## 2023-05-10 ENCOUNTER — OFFICE VISIT (OUTPATIENT)
Dept: INTERNAL MEDICINE | Facility: CLINIC | Age: 53
End: 2023-05-10
Payer: COMMERCIAL

## 2023-05-10 ENCOUNTER — LAB (OUTPATIENT)
Dept: LAB | Facility: HOSPITAL | Age: 53
End: 2023-05-10
Payer: COMMERCIAL

## 2023-05-10 VITALS
HEIGHT: 63 IN | TEMPERATURE: 98.5 F | BODY MASS INDEX: 31.01 KG/M2 | DIASTOLIC BLOOD PRESSURE: 96 MMHG | WEIGHT: 175 LBS | OXYGEN SATURATION: 97 % | HEART RATE: 80 BPM | SYSTOLIC BLOOD PRESSURE: 144 MMHG

## 2023-05-10 DIAGNOSIS — Z87.891 PERSONAL HISTORY OF TOBACCO USE, PRESENTING HAZARDS TO HEALTH: ICD-10-CM

## 2023-05-10 DIAGNOSIS — E03.9 ACQUIRED HYPOTHYROIDISM: Chronic | ICD-10-CM

## 2023-05-10 DIAGNOSIS — F99 INSOMNIA DUE TO OTHER MENTAL DISORDER: ICD-10-CM

## 2023-05-10 DIAGNOSIS — E78.2 MIXED HYPERLIPIDEMIA: ICD-10-CM

## 2023-05-10 DIAGNOSIS — D50.9 IRON DEFICIENCY ANEMIA, UNSPECIFIED IRON DEFICIENCY ANEMIA TYPE: ICD-10-CM

## 2023-05-10 DIAGNOSIS — Z12.31 ENCOUNTER FOR SCREENING MAMMOGRAM FOR MALIGNANT NEOPLASM OF BREAST: ICD-10-CM

## 2023-05-10 DIAGNOSIS — R87.610 ASCUS OF CERVIX WITH NEGATIVE HIGH RISK HPV: ICD-10-CM

## 2023-05-10 DIAGNOSIS — I10 PRIMARY HYPERTENSION: ICD-10-CM

## 2023-05-10 DIAGNOSIS — Z78.9 ALCOHOL USE: ICD-10-CM

## 2023-05-10 DIAGNOSIS — R21 RASH: ICD-10-CM

## 2023-05-10 DIAGNOSIS — M50.30 DDD (DEGENERATIVE DISC DISEASE), CERVICAL: ICD-10-CM

## 2023-05-10 DIAGNOSIS — Z00.00 ANNUAL PHYSICAL EXAM: Primary | ICD-10-CM

## 2023-05-10 DIAGNOSIS — K21.9 GERD WITHOUT ESOPHAGITIS: Chronic | ICD-10-CM

## 2023-05-10 DIAGNOSIS — F41.9 ANXIETY: ICD-10-CM

## 2023-05-10 DIAGNOSIS — E55.9 VITAMIN D DEFICIENCY: ICD-10-CM

## 2023-05-10 DIAGNOSIS — R20.0 RIGHT LEG NUMBNESS: ICD-10-CM

## 2023-05-10 DIAGNOSIS — F33.41 RECURRENT MAJOR DEPRESSIVE DISORDER, IN PARTIAL REMISSION: ICD-10-CM

## 2023-05-10 DIAGNOSIS — F51.05 INSOMNIA DUE TO OTHER MENTAL DISORDER: ICD-10-CM

## 2023-05-10 DIAGNOSIS — M79.604 RIGHT LEG PAIN: ICD-10-CM

## 2023-05-10 PROBLEM — Z86.39 HISTORY OF VITAMIN D DEFICIENCY: Status: RESOLVED | Noted: 2021-04-30 | Resolved: 2023-05-10

## 2023-05-10 PROBLEM — R11.2 NAUSEA AND VOMITING: Status: RESOLVED | Noted: 2022-01-14 | Resolved: 2023-05-10

## 2023-05-10 PROBLEM — Z72.0 TOBACCO USE: Status: RESOLVED | Noted: 2021-04-30 | Resolved: 2023-05-10

## 2023-05-10 LAB
DEPRECATED RDW RBC AUTO: 42.2 FL (ref 37–54)
ERYTHROCYTE [DISTWIDTH] IN BLOOD BY AUTOMATED COUNT: 12.8 % (ref 12.3–15.4)
HCT VFR BLD AUTO: 42 % (ref 34–46.6)
HGB BLD-MCNC: 14.9 G/DL (ref 12–15.9)
MCH RBC QN AUTO: 32.3 PG (ref 26.6–33)
MCHC RBC AUTO-ENTMCNC: 35.5 G/DL (ref 31.5–35.7)
MCV RBC AUTO: 90.9 FL (ref 79–97)
PLATELET # BLD AUTO: 269 10*3/MM3 (ref 140–450)
PMV BLD AUTO: 10 FL (ref 6–12)
RBC # BLD AUTO: 4.62 10*6/MM3 (ref 3.77–5.28)
WBC NRBC COR # BLD: 5.72 10*3/MM3 (ref 3.4–10.8)

## 2023-05-10 PROCEDURE — 84466 ASSAY OF TRANSFERRIN: CPT

## 2023-05-10 PROCEDURE — 80053 COMPREHEN METABOLIC PANEL: CPT

## 2023-05-10 PROCEDURE — 82306 VITAMIN D 25 HYDROXY: CPT

## 2023-05-10 PROCEDURE — 85027 COMPLETE CBC AUTOMATED: CPT

## 2023-05-10 PROCEDURE — 80061 LIPID PANEL: CPT

## 2023-05-10 PROCEDURE — 83540 ASSAY OF IRON: CPT

## 2023-05-10 PROCEDURE — 82728 ASSAY OF FERRITIN: CPT

## 2023-05-10 NOTE — PROGRESS NOTES
Internal Medicine Annual Exam  Mona Wilkerson is a 53 y.o. female who presents today for an annual exam and with concerns as outlined below.    Chief Complaint  Chief Complaint   Patient presents with   • Annual Exam   • Knee Pain     Left knee soft tissue, inner knee area, a couple days        HPI  Ms. Wilkerson comes in today for her physical. She has quit amlodipine due to swelling. On lisinopril with elevated BP. She notes that she continues to drink about 2-4 alcoholic beverages twice a week. She has struggled with weight loss but denies increased appetite. She notes some issues with R thigh pain and numbness. This is improving with deep tissue massage. She does have some muscular pain in her low back that she also manages with massage. She is up to date with pap smear and colon cancer screening. Her mammogram and LDCT are due next month. She remains tobacco free. She also denies e cigarette and illicit drug use. Vaccines are up to date.       Review of Systems  Review of Systems   Constitutional: Negative.    HENT: Negative for hearing loss.    Eyes: Negative.    Respiratory: Negative.    Cardiovascular: Negative.    Gastrointestinal: Negative.    Genitourinary: Negative.    Musculoskeletal: Positive for back pain and myalgias.   Skin: Negative.    Neurological: Positive for numbness.   Psychiatric/Behavioral: Positive for sleep disturbance. Negative for depressed mood.        Past Medical History  Past Medical History:   Diagnosis Date   • Abnormal Pap smear of cervix    • ADHD    • Cervical disc disorder    • Colon polyp 2018   • Cyst of ovary 11/21/2016   • Depression    • Depression with anxiety 11/04/2016    seeing [facility]- doing well  off lithium  changed to lamictal getting adhd testing   • GERD (gastroesophageal reflux disease)    • Hyperlipidemia 11/04/2016 26 Jan 2016  reviewed lipid panel with her    • Hypertension 10/07/2022   • Hypothyroidism    • Low back pain    • Lumbosacral disc disease     • Primary insomnia 2016   • Sleep difficulties    • Stage 3a chronic kidney disease 2021        Surgical History  Past Surgical History:   Procedure Laterality Date   • COLONOSCOPY     • COLONOSCOPY W/ POLYPECTOMY  10/29/2021    Dr. Nice, diverticulosis and removal of tubular adenoma   • ENDOMETRIAL ABLATION W/ NOVASURE     • ESSURE TUBAL LIGATION     • EYE SURGERY      Lasik   • LASIK Bilateral    • LIPOSUCTION     • RHINOPLASTY          Family History  Family History   Problem Relation Age of Onset   • Sleep disorder Mother    • Thyroid disease Mother    • Breast cancer Mother 53   • Hypertension Mother         Essential HTN   • Hyperlipidemia Mother    • Myelodysplastic syndrome Mother    • Cancer Mother         Breast CA, lumpectomy w/ nodes   • Hypertension Father         Essential HTN   • Hyperlipidemia Father    • Myelodysplastic syndrome Father    • Alcohol abuse Sister    • Hypertension Sister         Essential HTN   • Hyperlipidemia Sister    • Migraines Sister         Migraine headaches   • Sleep disorder Sister    • Arthritis Sister         Rheumatoid/Psoriatic?   • Hypertension Sister    • Hypothyroidism Sister    • Hyperlipidemia Sister    • Thyroid disease Sister    • Diabetes Paternal Aunt    • Breast cancer Maternal Grandmother 74   • Ovarian cancer Neg Hx    • Colon cancer Neg Hx    • Liver cancer Neg Hx    • Rectal cancer Neg Hx    • Stomach cancer Neg Hx         Social History  Social History     Socioeconomic History   • Marital status:    Tobacco Use   • Smoking status: Former     Packs/day: 0.75     Years: 30.00     Pack years: 22.50     Types: Cigarettes     Quit date: 2022     Years since quittin.0   • Smokeless tobacco: Never   • Tobacco comments:     last cigarette 4/15/2022   Vaping Use   • Vaping Use: Never used   Substance and Sexual Activity   • Alcohol use: Yes     Comment: 2-4 drinks two times a week   • Drug use: No   • Sexual activity: Not  "Currently     Partners: Male     Birth control/protection: Latoya     Comment: Latoya        Current Medications  Current Outpatient Medications on File Prior to Visit   Medication Sig Dispense Refill   • estradiol (Vagifem) 10 MCG tablet vaginal tablet Insert 1 tablet into the vagina 2 (Two) Times a Week. 24 tablet 3   • FLUoxetine (PROzac) 20 MG capsule Take 1 capsule by mouth Daily. 90 capsule 1   • levothyroxine (SYNTHROID, LEVOTHROID) 50 MCG tablet Take 1 tablet by mouth Daily. 90 tablet 3   • lisinopril (PRINIVIL,ZESTRIL) 20 MG tablet Take 1 tablet by mouth Daily. 90 tablet 3   • mupirocin (BACTROBAN) 2 % ointment Apply 1 application topically to the appropriate area as directed 3 (Three) Times a Day. 30 g 0   • omeprazole (priLOSEC) 20 MG capsule Take 1 capsule by mouth Daily. 90 capsule 1   • traZODone (DESYREL) 100 MG tablet Take  mg by mouth At Night As Needed.     • amLODIPine (NORVASC) 5 MG tablet Take 1 tablet by mouth Daily. (Patient not taking: Reported on 5/10/2023) 90 tablet 3     No current facility-administered medications on file prior to visit.       Allergies  No Known Allergies     Objective  Visit Vitals  /96   Pulse 80   Temp 98.5 °F (36.9 °C)   Ht 160 cm (63\")   Wt 79.4 kg (175 lb)   SpO2 97%   BMI 31.00 kg/m²        Physical Exam  Physical Exam  Vitals and nursing note reviewed.   Constitutional:       General: She is not in acute distress.     Appearance: She is well-developed. She is obese. She is not ill-appearing, toxic-appearing or diaphoretic.   HENT:      Head: Normocephalic and atraumatic.      Right Ear: Tympanic membrane, ear canal and external ear normal.      Left Ear: Tympanic membrane, ear canal and external ear normal.      Nose: Nose normal.   Eyes:      General: No scleral icterus.     Conjunctiva/sclera: Conjunctivae normal.   Cardiovascular:      Rate and Rhythm: Normal rate and regular rhythm.      Heart sounds: Normal heart sounds. No murmur " heard.  Pulmonary:      Effort: Pulmonary effort is normal. No respiratory distress.      Breath sounds: Normal breath sounds.   Abdominal:      General: There is no distension.      Palpations: Abdomen is soft. There is no mass.      Tenderness: There is no abdominal tenderness.   Musculoskeletal:         General: No swelling, tenderness, deformity or signs of injury.      Cervical back: Neck supple.      Right lower leg: No edema.      Left lower leg: No edema.   Skin:     General: Skin is warm and dry.      Findings: No rash.   Neurological:      Mental Status: She is alert and oriented to person, place, and time. Mental status is at baseline.      Gait: Gait normal.   Psychiatric:         Mood and Affect: Mood normal.         Behavior: Behavior normal.         Thought Content: Thought content normal.         Judgment: Judgment normal.          Results  Results for orders placed or performed in visit on 04/05/23   Basic Metabolic Panel    Specimen: Blood   Result Value Ref Range    Glucose 84 65 - 99 mg/dL    BUN 9 6 - 20 mg/dL    Creatinine 1.12 (H) 0.57 - 1.00 mg/dL    Sodium 136 136 - 145 mmol/L    Potassium 3.8 3.5 - 5.2 mmol/L    Chloride 97 (L) 98 - 107 mmol/L    CO2 28.1 22.0 - 29.0 mmol/L    Calcium 9.6 8.6 - 10.5 mg/dL    BUN/Creatinine Ratio 8.0 7.0 - 25.0    Anion Gap 10.9 5.0 - 15.0 mmol/L    eGFR 58.6 (L) >60.0 mL/min/1.73   TSH Rfx On Abnormal To Free T4    Specimen: Blood   Result Value Ref Range    TSH 1.060 0.270 - 4.200 uIU/mL        Assessment and Plan  Diagnoses and all orders for this visit:    Annual physical exam  - Counseling was given to patient for the following topics:  appropriate exercise, healthy eating habits, disease prevention, importance of self breast exam and breast health and risks of alcohol consumption and recommend decreased intake or cessation. Also discussed the importance of regular dental and vision care.    Acquired hypothyroidism  - stable on levothyroxine with recent  normal TSH  - discussed that dry skin was not related to thyroid dysfunction based on recent TSH in low normal range    GERD without esophagitis  - controlled, continue omeprazole 20mg daily    Mixed hyperlipidemia  - has not needed statin  - update lipid panel    Recurrent major depressive disorder, in partial remission  Anxiety  - mood stable  - Continue prozac 20mg daily.    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  - Will check vitamin D level    Alcohol use  - has cut back to 2-4 drinks twice a week  - encouraged her to continue efforts to cut back    Insomnia due to other mental disorder  - previously following with psychiatry on ambien but has stopped seeing psychiatrist so using trazodone which she has not tolerated due to daytime drowsiness, nightmares.  - she is interested in seeing psychiatrist in our office to discuss resuming ambien. Will refer.    Primary hypertension  - BP not controlled.  - did not tolerate amlodipine due to swelling  - on lisinopril 20mg daily and will need to defer changes to regimen due to recent mild ZONIA. Recheck CMP today.    Iron deficiency anemia, unspecified iron deficiency anemia type  - resolved with iron supplementation which has since been discontinued  - Iron deficiency potentially due to alcohol use, poor nutrition. Colonoscopy negative 11/2021  - She has significantly cut back on alcohol use  - Check CBC, iron studies today    DDD (degenerative disc disease), cervical  - Seen in ED 3/2022 after fall. CT with C4-5 and C5-6 mild spinal stenosis. She subsequently had neurosurgery eval without evidence of radiculopathy or myelopathy. Surgery deferred.    ASCUS of cervix with negative high risk HPV  - Had pap smear 9/2021 with ASCUS negative HPV. Pap 9/2022 was normal.  - following with GYN     Right leg pain, Right leg numbness  - sxs may be radicular versus meralgia paresthetica  - will evaluate low back with  lumbar xray    Rash  - used sister's calcipotriene cream with relief  - appears eczematous but could be psoriasis  - she will notify me if rash recurs    Health Maintenance   Topic Date Due   • LIPID PANEL  2023   • ANNUAL PHYSICAL  2023   • MAMMOGRAM  2023   • LUNG CANCER SCREENING  2023   • INFLUENZA VACCINE  2023   • PAP SMEAR  2025   • TDAP/TD VACCINES (2 - Td or Tdap) 2025   • COLORECTAL CANCER SCREENING  2026   • HEPATITIS C SCREENING  Completed   • COVID-19 Vaccine  Completed   • ZOSTER VACCINE  Completed   • Pneumococcal Vaccine 0-64  Aged Out      Health Maintenance  - Pap smear: 2022 neg cytology, neg HPV. Okay to do pap in this office if she chooses. Next .  - Mammogram: 2022 BIRADS 1. Ordered.   - Colonoscopy: 2021, repeat 5y  - Lung cancer screenin2022, repeat annually. Ordered.  - HCV: negative  - Immunizations: COVID UTD. Tdap . Shingrix complete.  - Depression screening: negative 2023    Return in about 3 months (around 8/10/2023) for Follow up HTN, 1 year for annual, Labs today.

## 2023-05-11 DIAGNOSIS — F41.9 ANXIETY: Primary | ICD-10-CM

## 2023-05-11 DIAGNOSIS — F33.41 RECURRENT MAJOR DEPRESSIVE DISORDER, IN PARTIAL REMISSION: ICD-10-CM

## 2023-05-11 DIAGNOSIS — F99 INSOMNIA DUE TO OTHER MENTAL DISORDER: ICD-10-CM

## 2023-05-11 DIAGNOSIS — F51.05 INSOMNIA DUE TO OTHER MENTAL DISORDER: ICD-10-CM

## 2023-05-11 LAB
25(OH)D3 SERPL-MCNC: 83.1 NG/ML (ref 30–100)
ALBUMIN SERPL-MCNC: 4.8 G/DL (ref 3.5–5.2)
ALBUMIN/GLOB SERPL: 1.7 G/DL
ALP SERPL-CCNC: 131 U/L (ref 39–117)
ALT SERPL W P-5'-P-CCNC: 25 U/L (ref 1–33)
ANION GAP SERPL CALCULATED.3IONS-SCNC: 13.7 MMOL/L (ref 5–15)
AST SERPL-CCNC: 23 U/L (ref 1–32)
BILIRUB SERPL-MCNC: 0.3 MG/DL (ref 0–1.2)
BUN SERPL-MCNC: 10 MG/DL (ref 6–20)
BUN/CREAT SERPL: 11.5 (ref 7–25)
CALCIUM SPEC-SCNC: 9.6 MG/DL (ref 8.6–10.5)
CHLORIDE SERPL-SCNC: 98 MMOL/L (ref 98–107)
CHOLEST SERPL-MCNC: 256 MG/DL (ref 0–200)
CO2 SERPL-SCNC: 26.3 MMOL/L (ref 22–29)
CREAT SERPL-MCNC: 0.87 MG/DL (ref 0.57–1)
EGFRCR SERPLBLD CKD-EPI 2021: 79.3 ML/MIN/1.73
FERRITIN SERPL-MCNC: 91 NG/ML (ref 13–150)
GLOBULIN UR ELPH-MCNC: 2.9 GM/DL
GLUCOSE SERPL-MCNC: 83 MG/DL (ref 65–99)
HDLC SERPL-MCNC: 91 MG/DL (ref 40–60)
IRON 24H UR-MRATE: 102 MCG/DL (ref 37–145)
IRON SATN MFR SERPL: 20 % (ref 20–50)
LDLC SERPL CALC-MCNC: 147 MG/DL (ref 0–100)
LDLC/HDLC SERPL: 1.58 {RATIO}
POTASSIUM SERPL-SCNC: 3.9 MMOL/L (ref 3.5–5.2)
PROT SERPL-MCNC: 7.7 G/DL (ref 6–8.5)
SODIUM SERPL-SCNC: 138 MMOL/L (ref 136–145)
TIBC SERPL-MCNC: 502 MCG/DL (ref 298–536)
TRANSFERRIN SERPL-MCNC: 337 MG/DL (ref 200–360)
TRIGL SERPL-MCNC: 107 MG/DL (ref 0–150)
VLDLC SERPL-MCNC: 18 MG/DL (ref 5–40)

## 2023-05-18 ENCOUNTER — TELEPHONE (OUTPATIENT)
Dept: INTERNAL MEDICINE | Facility: CLINIC | Age: 53
End: 2023-05-18
Payer: COMMERCIAL

## 2023-05-18 NOTE — TELEPHONE ENCOUNTER
"  Caller: Mona Wilkerson \"Ramy\"    Relationship: Self    Best call back number:[phone]    What is the best time to reach you: ANYTIME    Who are you requesting to speak with (clinical staff, provider,  specific staff member): PCP        What was the call regarding: REQUEST CALLBACK TO DISCUSS LAB RESULTS. CAN LEAVE A MESSAGE    Do you require a callback: YES        "

## 2023-05-21 DIAGNOSIS — I10 PRIMARY HYPERTENSION: ICD-10-CM

## 2023-05-21 RX ORDER — LISINOPRIL 40 MG/1
40 TABLET ORAL DAILY
Qty: 90 TABLET | Refills: 3 | Status: SHIPPED | OUTPATIENT
Start: 2023-05-21

## 2023-05-22 DIAGNOSIS — F41.9 ANXIETY: ICD-10-CM

## 2023-05-22 DIAGNOSIS — K21.9 GERD WITHOUT ESOPHAGITIS: Chronic | ICD-10-CM

## 2023-05-22 DIAGNOSIS — E03.9 ACQUIRED HYPOTHYROIDISM: Chronic | ICD-10-CM

## 2023-05-22 DIAGNOSIS — F33.41 RECURRENT MAJOR DEPRESSIVE DISORDER, IN PARTIAL REMISSION: ICD-10-CM

## 2023-05-22 DIAGNOSIS — N95.2 ATROPHY OF VAGINA: ICD-10-CM

## 2023-05-22 RX ORDER — OMEPRAZOLE 20 MG/1
20 CAPSULE, DELAYED RELEASE ORAL DAILY
Qty: 90 CAPSULE | Refills: 1 | Status: CANCELLED | OUTPATIENT
Start: 2023-05-22

## 2023-05-22 RX ORDER — FLUOXETINE HYDROCHLORIDE 20 MG/1
20 CAPSULE ORAL DAILY
Qty: 90 CAPSULE | Refills: 1 | Status: CANCELLED | OUTPATIENT
Start: 2023-05-22

## 2023-05-22 RX ORDER — LEVOTHYROXINE SODIUM 0.05 MG/1
50 TABLET ORAL DAILY
Qty: 90 TABLET | Refills: 3 | Status: CANCELLED | OUTPATIENT
Start: 2023-05-22

## 2023-05-22 NOTE — TELEPHONE ENCOUNTER
----- Message from Muna Huitron MD sent at 5/21/2023 10:28 PM EDT -----  Please call patient.  Her blood counts and iron levels remain normal.  Her kidney function has normalized and liver labs look good. I recommend she increase lisinopril to 40mg daily to better control blood pressure. She will need to recheck kidney function in a week.  Cholesterol is still elevated but overall stable.  Vitamin D level is normal.

## 2023-05-22 NOTE — TELEPHONE ENCOUNTER
PT called in and was read [employer] message [employer]. She stated she did not have any further questions at this time about her lab results.    Phone: [phone]  The PT did have a request for a refill on some of her medications.     FLUoxetine (PROzac) 20 MG capsule    levothyroxine (SYNTHROID, LEVOTHROID) 50 MCG tablet    omeprazole (priLOSEC) 20 MG capsule    estradiol (Vagifem) 10 MCG tablet vaginal tablet

## 2023-05-22 NOTE — TELEPHONE ENCOUNTER
Called pt and informed her that the Rx she requested were not due for refills. Went through each one and gave a time frame for renewal needed. Pt verbalized her understanding.    Explained that the estradiol was done by another provider but she stated that she has moved her PAP care back to [facility]. Please advise.

## 2023-05-23 RX ORDER — ESTRADIOL 10 UG/1
1 INSERT VAGINAL 2 TIMES WEEKLY
Qty: 24 TABLET | Refills: 3 | Status: SHIPPED | OUTPATIENT
Start: 2023-05-25

## 2023-05-26 ENCOUNTER — LAB (OUTPATIENT)
Dept: LAB | Facility: HOSPITAL | Age: 53
End: 2023-05-26
Payer: COMMERCIAL

## 2023-05-26 DIAGNOSIS — I10 PRIMARY HYPERTENSION: ICD-10-CM

## 2023-05-26 PROCEDURE — 80048 BASIC METABOLIC PNL TOTAL CA: CPT

## 2023-05-27 LAB
ANION GAP SERPL CALCULATED.3IONS-SCNC: 12 MMOL/L (ref 5–15)
BUN SERPL-MCNC: 10 MG/DL (ref 6–20)
BUN/CREAT SERPL: 11.1 (ref 7–25)
CALCIUM SPEC-SCNC: 9 MG/DL (ref 8.6–10.5)
CHLORIDE SERPL-SCNC: 97 MMOL/L (ref 98–107)
CO2 SERPL-SCNC: 27 MMOL/L (ref 22–29)
CREAT SERPL-MCNC: 0.9 MG/DL (ref 0.57–1)
EGFRCR SERPLBLD CKD-EPI 2021: 76.1 ML/MIN/1.73
GLUCOSE SERPL-MCNC: 89 MG/DL (ref 65–99)
POTASSIUM SERPL-SCNC: 3.9 MMOL/L (ref 3.5–5.2)
SODIUM SERPL-SCNC: 136 MMOL/L (ref 136–145)

## 2023-05-30 ENCOUNTER — TELEPHONE (OUTPATIENT)
Dept: INTERNAL MEDICINE | Facility: CLINIC | Age: 53
End: 2023-05-30

## 2023-05-30 NOTE — TELEPHONE ENCOUNTER
"  Caller: Mona Wilkerson \"Ramy\"    Relationship: Self    Best call back number:[phone]    What is the best time to reach you: ANYTIME    Who are you requesting to speak with (clinical staff, provider,  specific staff member): LA [place]    What was the call regarding: THIS PATIENT WOULD LIKE TO BECOME ANEW  PATIENT OF LA GILLILAND BUT BEFORE SCHEDULING A NEW PATIENT APPOINTMENT, SHE WOULD LIKE TO KNOW IF THIS PROVIDER IS WILLING TO PRESCRIBE AMBIEN?        "

## 2023-05-30 NOTE — TELEPHONE ENCOUNTER
"  Caller: Mona Wilkerson \"Ramy\"    Relationship: Self    Best call back number:     What is the best time to reach you: ANYTIME    Who are you requesting to speak with (clinical staff, provider,  specific staff member): CLINICAL STAFF    Do you know the name of the person who called: RAMY    What was the call regarding: PATIENT WANTED TO UPDATE PHARMACY TO [facility] MAIL ORDER    Is it okay if the provider responds through MyChart:  YES  "

## 2023-05-31 NOTE — TELEPHONE ENCOUNTER
"Spoke to patient , read Nichole's message . Patient states that she will  \"think about it and call back at a later time to schedule\"  "

## 2023-05-31 NOTE — TELEPHONE ENCOUNTER
Please let the patient know that I typically do not prescribe this medication.  I would be glad to see her and work with her to treat her insomnia and attempt other medication treatment for her insomnia but I generally do not prescribe scheduled medications for sleep.

## 2023-06-02 DIAGNOSIS — K21.9 GERD WITHOUT ESOPHAGITIS: Chronic | ICD-10-CM

## 2023-06-02 RX ORDER — OMEPRAZOLE 20 MG/1
CAPSULE, DELAYED RELEASE ORAL
Qty: 90 CAPSULE | Refills: 0 | Status: SHIPPED | OUTPATIENT
Start: 2023-06-02

## 2023-07-05 PROBLEM — M51.369 DDD (DEGENERATIVE DISC DISEASE), LUMBAR: Status: ACTIVE | Noted: 2023-07-05

## 2023-07-05 PROBLEM — M54.16 LUMBAR RADICULOPATHY: Status: ACTIVE | Noted: 2023-07-05

## 2023-07-05 PROBLEM — M51.36 DDD (DEGENERATIVE DISC DISEASE), LUMBAR: Status: ACTIVE | Noted: 2023-07-05

## 2023-08-09 DIAGNOSIS — F33.41 RECURRENT MAJOR DEPRESSIVE DISORDER, IN PARTIAL REMISSION: ICD-10-CM

## 2023-08-09 DIAGNOSIS — F41.9 ANXIETY: ICD-10-CM

## 2023-08-09 RX ORDER — FLUOXETINE HYDROCHLORIDE 20 MG/1
CAPSULE ORAL
Qty: 90 CAPSULE | Refills: 0 | Status: SHIPPED | OUTPATIENT
Start: 2023-08-09

## 2023-08-17 ENCOUNTER — OFFICE VISIT (OUTPATIENT)
Dept: INTERNAL MEDICINE | Facility: CLINIC | Age: 53
End: 2023-08-17
Payer: COMMERCIAL

## 2023-08-17 VITALS
WEIGHT: 176 LBS | BODY MASS INDEX: 31.18 KG/M2 | HEIGHT: 63 IN | OXYGEN SATURATION: 97 % | SYSTOLIC BLOOD PRESSURE: 128 MMHG | TEMPERATURE: 97.7 F | DIASTOLIC BLOOD PRESSURE: 82 MMHG | HEART RATE: 78 BPM

## 2023-08-17 DIAGNOSIS — M54.16 LUMBAR RADICULOPATHY: ICD-10-CM

## 2023-08-17 DIAGNOSIS — R06.09 DYSPNEA ON EXERTION: ICD-10-CM

## 2023-08-17 DIAGNOSIS — M72.2 PLANTAR FASCIITIS OF LEFT FOOT: ICD-10-CM

## 2023-08-17 DIAGNOSIS — M51.36 DDD (DEGENERATIVE DISC DISEASE), LUMBAR: ICD-10-CM

## 2023-08-17 DIAGNOSIS — I10 PRIMARY HYPERTENSION: Primary | ICD-10-CM

## 2023-08-17 DIAGNOSIS — K21.9 GERD WITHOUT ESOPHAGITIS: Chronic | ICD-10-CM

## 2023-08-17 PROCEDURE — 99214 OFFICE O/P EST MOD 30 MIN: CPT | Performed by: INTERNAL MEDICINE

## 2023-08-17 RX ORDER — FLUTICASONE PROPIONATE 50 MCG
2 SPRAY, SUSPENSION (ML) NASAL NIGHTLY
COMMUNITY

## 2023-08-17 RX ORDER — LORATADINE 10 MG/1
10 TABLET ORAL DAILY
COMMUNITY

## 2023-08-17 RX ORDER — OMEPRAZOLE 20 MG/1
20 CAPSULE, DELAYED RELEASE ORAL DAILY
Qty: 90 CAPSULE | Refills: 1 | Status: SHIPPED | OUTPATIENT
Start: 2023-08-17

## 2023-08-17 NOTE — PROGRESS NOTES
"Internal Medicine Follow Up    Chief Complaint  Mona Wilkerson is a 53 y.o. female who presents today for follow up of chronic medical conditions outlined below.    Chief Complaint   Patient presents with    Hypertension    Shortness of Breath        HPI  Ms. Wilkerson comes in today for follow up. BP much better with increase in lisinopril. She does notes dyspnea with exertion and a cough she describes as a \"smoker's cough.\" She initially noted this x several months but on further discussion this has been present for over a year or two. She has had CXR and BNP which were normal. PFTs were ordered in 2022 but never completed. She is now nearing her deductible and would like to schedule these. She is a former smoker. She did have LDCT in June without nodules, emphysematous changes, or cardiomegaly. No orthopnea or edema. She does have post nasal drainage on claritin. Purchased flonase but did not start using it. She notes also that she has L heel pain and diagnosis of plantar fasciitis x15y. She has custom orthotics but no improvement. Has had injection in R foot with relief while living in [place]. Would like to see Dr. Mann.    Hypertension  Associated symptoms include shortness of breath.   Shortness of Breath       Review of Systems  Review of Systems   Constitutional: Negative.    HENT:  Positive for postnasal drip.    Respiratory:  Positive for cough and shortness of breath.    Cardiovascular: Negative.    Musculoskeletal:  Positive for arthralgias and back pain.      Current Medications  Current Outpatient Medications on File Prior to Visit   Medication Sig Dispense Refill    clobetasol (TEMOVATE) 0.05 % cream Apply 1 application  topically to the appropriate area as directed 2 (Two) Times a Day.      estradiol (Vagifem) 10 MCG tablet vaginal tablet Insert 1 tablet into the vagina 2 (Two) Times a Week. 24 tablet 3    fluocinonide (LIDEX) 0.05 % external solution Apply  topically to the appropriate area " "as directed Daily.      FLUoxetine (PROzac) 20 MG capsule TAKE ONE CAPSULE BY MOUTH ONE TIME DAILY 90 capsule 0    ketoconazole (NIZORAL) 2 % shampoo       levothyroxine (SYNTHROID, LEVOTHROID) 50 MCG tablet Take 1 tablet by mouth Daily. 90 tablet 3    lisinopril (PRINIVIL,ZESTRIL) 40 MG tablet Take 1 tablet by mouth Daily. 90 tablet 3    [DISCONTINUED] mirtazapine (REMERON) 7.5 MG tablet Take 1 tablet by mouth Every Night. 30 tablet 0    [DISCONTINUED] omeprazole (priLOSEC) 20 MG capsule TAKE ONE CAPSULE BY MOUTH ONCE DAILY 90 capsule 0    fluticasone (FLONASE) 50 MCG/ACT nasal spray 2 sprays into the nostril(s) as directed by provider Every Night.      loratadine (Claritin) 10 MG tablet Take 1 tablet by mouth Daily.       No current facility-administered medications on file prior to visit.       Allergies  Allergies   Allergen Reactions    Amlodipine Swelling       Objective  Visit Vitals  /82   Pulse 78   Temp 97.7 øF (36.5 øC)   Ht 160 cm (63\")   Wt 79.8 kg (176 lb)   SpO2 97%   BMI 31.18 kg/mý        Physical Exam  Physical Exam  Vitals and nursing note reviewed.   Constitutional:       General: She is not in acute distress.     Appearance: She is well-developed. She is obese. She is not ill-appearing or toxic-appearing.   HENT:      Head: Normocephalic and atraumatic.   Eyes:      Conjunctiva/sclera: Conjunctivae normal.   Cardiovascular:      Rate and Rhythm: Normal rate and regular rhythm.      Heart sounds: Normal heart sounds. No murmur heard.  Pulmonary:      Effort: Pulmonary effort is normal. No respiratory distress.      Breath sounds: Normal breath sounds. No wheezing, rhonchi or rales.   Musculoskeletal:         General: Tenderness (L heel) present. No swelling or deformity.      Cervical back: Neck supple.      Right lower leg: No edema.      Left lower leg: No edema.   Lymphadenopathy:      Cervical: No cervical adenopathy.   Skin:     General: Skin is warm and dry.   Neurological:      " Mental Status: She is alert and oriented to person, place, and time. Mental status is at baseline.      Gait: Gait normal.       Results  Results for orders placed or performed in visit on 23   Basic Metabolic Panel    Specimen: Blood   Result Value Ref Range    Glucose 89 65 - 99 mg/dL    BUN 10 6 - 20 mg/dL    Creatinine 0.90 0.57 - 1.00 mg/dL    Sodium 136 136 - 145 mmol/L    Potassium 3.9 3.5 - 5.2 mmol/L    Chloride 97 (L) 98 - 107 mmol/L    CO2 27.0 22.0 - 29.0 mmol/L    Calcium 9.0 8.6 - 10.5 mg/dL    BUN/Creatinine Ratio 11.1 7.0 - 25.0    Anion Gap 12.0 5.0 - 15.0 mmol/L    eGFR 76.1 >60.0 mL/min/1.73        Assessment and Plan  Diagnoses and all orders for this visit:    Primary hypertension  - BP controlled, continue lisinopril 40mg daily    GERD without esophagitis  - stable, continue omeprazole 20mg daily    Dyspnea on exertion  - longstanding with history of tobacco use  - LDCT recently unremarkable and prior CXR also normal.   - PFTs ordered  - she will start flonase for post nasal drip as well    DDD (degenerative disc disease), lumbar  Lumbar radiculopathy  - xray with multilevel degenerative changes worst at L3-4  - currently able to manage and defers PT referral    Plantar fasciitis of left foot  - hx of plantar fasciitis x15+ years, previously treated in [place]  - has custom orthotics but previously has required injection for relief  - will refer to Dr. Mann      [facility] Maintenance  - Pap smear: 2022 neg cytology, neg HPV. Okay to do pap in this office if she chooses. Next .  - Mammogram: 2023 BIRADS 1.   - Colonoscopy: 2021, repeat 5y  - Lung cancer screenin2023, repeat annually.  - HCV: negative  - Immunizations: COVID UTD. Tdap 2015. Shingrix complete.  - Depression screening: negative 2023    Return in about 3 months (around 2023) for Follow up SOA.

## 2023-08-21 DIAGNOSIS — K21.9 GERD WITHOUT ESOPHAGITIS: Chronic | ICD-10-CM

## 2023-08-21 RX ORDER — OMEPRAZOLE 20 MG/1
CAPSULE, DELAYED RELEASE ORAL
Qty: 90 CAPSULE | Refills: 0 | OUTPATIENT
Start: 2023-08-21

## 2023-08-22 DIAGNOSIS — F41.9 ANXIETY: ICD-10-CM

## 2023-08-22 DIAGNOSIS — F33.41 RECURRENT MAJOR DEPRESSIVE DISORDER, IN PARTIAL REMISSION: ICD-10-CM

## 2023-08-22 DIAGNOSIS — N95.2 ATROPHY OF VAGINA: ICD-10-CM

## 2023-08-23 RX ORDER — FLUOXETINE HYDROCHLORIDE 20 MG/1
20 CAPSULE ORAL DAILY
Qty: 90 CAPSULE | Refills: 0 | OUTPATIENT
Start: 2023-08-23

## 2023-08-23 RX ORDER — ESTRADIOL 10 UG/1
1 INSERT VAGINAL 2 TIMES WEEKLY
Qty: 24 TABLET | Refills: 3 | OUTPATIENT
Start: 2023-08-24

## 2023-08-24 ENCOUNTER — TELEPHONE (OUTPATIENT)
Dept: INTERNAL MEDICINE | Facility: CLINIC | Age: 53
End: 2023-08-24
Payer: COMMERCIAL

## 2023-08-24 DIAGNOSIS — N95.2 ATROPHY OF VAGINA: ICD-10-CM

## 2023-08-24 RX ORDER — ESTRADIOL 10 UG/1
1 INSERT VAGINAL 2 TIMES WEEKLY
Qty: 24 TABLET | Refills: 3 | Status: SHIPPED | OUTPATIENT
Start: 2023-08-24

## 2023-08-24 NOTE — TELEPHONE ENCOUNTER
Pharmacy Name: [id number] - [place], [place] - [address] - [phone]  - [phone]      Pharmacy representative phone number: [phone]     What medication are you calling in regards to: OMEPRAZOLE (PRILOSEC) 20MG CAPSULE    What question does the pharmacy have: PHARMACY IS CHECKING THE STATUS OF THE REFILL REQUEST FOR THIS MEDICATION    Who is the provider that prescribed the medication: DR PERSAUD    Additional notes: N/A

## 2023-08-24 NOTE — TELEPHONE ENCOUNTER
Medication sent to [facility] [place], operated by [facility] during pts last visit on 8/17/23. Will call pt to confirm this was correct.

## 2023-08-24 NOTE — TELEPHONE ENCOUNTER
Pt called and stated that we do not need to resend this Rx to another pharmacy. Pt states she is going through pricing and is in between pharmacy's. Sent a new Rx for Estradial to [facility] pharmacy as pt no longer uses the [facility] program.

## 2023-09-08 ENCOUNTER — TELEPHONE (OUTPATIENT)
Dept: INTERNAL MEDICINE | Facility: CLINIC | Age: 53
End: 2023-09-08
Payer: COMMERCIAL

## 2023-09-08 NOTE — TELEPHONE ENCOUNTER
Called pt and she stated she was given an antibiotic amoxicillin and prednisone from  for URI earlier this week and has now developed a vaginal and oral yeast infection. Advised she would need an appt since we did not give the antibiotic and gave options for open appts tomorrow. Pt states she would prefer to have addressed today and will got back to the [facility] on [address]. Advised she can bring us the records or we can wait till next week to call for them. Pt states she will try to get them.

## 2023-09-08 NOTE — TELEPHONE ENCOUNTER
"    Caller: Mona Wilkerson \"Ramy\"    Relationship: Self    Best call back number: [phone]    What medication are you requesting: SOMETHING FOR VAGINAL YEAST INFECTION AND YEAST IN MOUTH    What are your current symptoms: YEAST     How long have you been experiencing symptoms: SINCE MONDAY NIGHT      If a prescription is needed, what is your preferred pharmacy and phone number: [facility]     Additional notes: THE PATIENT WOULD LIKE TO KNOW IF THE DOCTOR CAN CALL IN YEAST MEDICATION FOR HER SHE STATES THAT SHE JUST FINISHED AN ANTIBIOTIC FOR AN INFECTION THAT SHE HAD         "

## 2023-09-15 ENCOUNTER — OFFICE VISIT (OUTPATIENT)
Dept: ORTHOPEDIC SURGERY | Facility: CLINIC | Age: 53
End: 2023-09-15
Payer: COMMERCIAL

## 2023-09-15 ENCOUNTER — HOSPITAL ENCOUNTER (OUTPATIENT)
Dept: PULMONOLOGY | Facility: HOSPITAL | Age: 53
Discharge: HOME | End: 2023-09-15
Payer: COMMERCIAL

## 2023-09-15 VITALS
DIASTOLIC BLOOD PRESSURE: 79 MMHG | BODY MASS INDEX: 29.62 KG/M2 | SYSTOLIC BLOOD PRESSURE: 119 MMHG | WEIGHT: 167.2 LBS | HEIGHT: 63 IN

## 2023-09-15 DIAGNOSIS — M20.22 ACQUIRED HALLUX RIGIDUS OF LEFT FOOT: ICD-10-CM

## 2023-09-15 DIAGNOSIS — M79.672 LEFT FOOT PAIN: Primary | ICD-10-CM

## 2023-09-15 DIAGNOSIS — R06.09 DYSPNEA ON EXERTION: ICD-10-CM

## 2023-09-15 DIAGNOSIS — M72.2 PLANTAR FASCIITIS: ICD-10-CM

## 2023-09-15 PROCEDURE — 99203 OFFICE O/P NEW LOW 30 MIN: CPT | Performed by: ORTHOPAEDIC SURGERY

## 2023-09-15 PROCEDURE — 94726 PLETHYSMOGRAPHY LUNG VOLUMES: CPT

## 2023-09-15 PROCEDURE — 94729 DIFFUSING CAPACITY: CPT

## 2023-09-15 PROCEDURE — 94010 BREATHING CAPACITY TEST: CPT

## 2023-09-15 NOTE — PROGRESS NOTES
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  She has a 6-month history of left heel pain.  She has had right heel pain over many years also.  She had an injection in [place] that helped temporarily.  She has had a night splint for the right side, but she was not sure if it was universal.  She does wear good custom orthotics from [employer].  She has tried deep tissue massage, squeezing the heel etc.  She does not have much morning pain, the pain gets worse during the day.  She wears good shoes.  She does not have any pain in the great toe, I found she has some arthritic change on x-ray but it is asymptomatic      The patient does have a personal or family history of DVT, PE, hypercoagulable state or risk factors for clotting.  Her father had DVTs      Current Outpatient Medications on File Prior to Visit   Medication Sig Dispense Refill    clobetasol (TEMOVATE) 0.05 % cream Apply 1 application  topically to the appropriate area as directed 2 (Two) Times a Day.      estradiol (Vagifem) 10 MCG tablet vaginal tablet Insert 1 tablet into the vagina 2 (Two) Times a Week. 24 tablet 3    fluconazole (DIFLUCAN) 150 MG tablet Take 1st dose on day 1. Repeat 2nd dose in 72 hours of symptoms do not improve. 2 tablet 0    fluocinonide (LIDEX) 0.05 % external solution Apply  topically to the appropriate area as directed Daily.      FLUoxetine (PROzac) 20 MG capsule TAKE ONE CAPSULE BY MOUTH ONE TIME DAILY 90 capsule 0    fluticasone (FLONASE) 50 MCG/ACT nasal spray 2 sprays into the nostril(s) as directed by provider Every Night.      ketoconazole (NIZORAL) 2 % shampoo       levothyroxine (SYNTHROID, LEVOTHROID) 50 MCG tablet Take 1 tablet by  mouth Daily. 90 tablet 3    levothyroxine (SYNTHROID, LEVOTHROID) 50 MCG tablet Take 1 tablet by mouth Daily. 90 tablet 3    lisinopril (PRINIVIL,ZESTRIL) 40 MG tablet Take 1 tablet by mouth Daily. 90 tablet 3    lisinopril (PRINIVIL,ZESTRIL) 40 MG tablet Take 1 tablet by mouth Daily. 90 tablet 2    nystatin (MYCOSTATIN) 100,000 unit/mL suspension Take 5 mL by mouth 4 (Four) Times a Day. 473 mL 0    omeprazole (priLOSEC) 20 MG capsule Take 1 capsule by mouth Daily. 90 capsule 1    [DISCONTINUED] loratadine (CLARITIN) 10 MG tablet Take 1 tablet by mouth Daily.       No current facility-administered medications on file prior to visit.      Allergies   Allergen Reactions    Amlodipine Swelling      Past Medical History:   Diagnosis Date    Abnormal Pap smear of cervix     ADHD     Cervical disc disorder     Colon polyp 2018    Cyst of ovary 11/21/2016    Depression     Depression with anxiety 11/04/2016    seeing [facility]- doing well  off lithium  changed to lamictal getting adhd testing    GERD (gastroesophageal reflux disease)     Hyperlipidemia 11/04/2016 26 Jan 2016  reviewed lipid panel with her     Hypertension 10/07/2022    Hypothyroidism     Low back pain     Lumbar radiculopathy 7/5/2023    Lumbosacral disc disease     Nausea and vomiting 01/14/2022    Primary insomnia 11/21/2016    Sleep difficulties     Stage 3a chronic kidney disease 05/05/2021    Tobacco use 04/30/2021     Past Surgical History:   Procedure Laterality Date    COLONOSCOPY      COLONOSCOPY W/ POLYPECTOMY  10/29/2021    Dr. Nice, diverticulosis and removal of tubular adenoma    ENDOMETRIAL ABLATION W/ NOVASURE      ENDOSCOPY  2018    ESSURE TUBAL LIGATION      EYE SURGERY  2002    Lasik    LASIK Bilateral     LIPOSUCTION      RHINOPLASTY       Family History   Problem Relation Age of Onset    Sleep disorder Mother     Thyroid disease Mother     Breast cancer Mother 53    Hypertension Mother         Essential HTN    Hyperlipidemia Mother      Myelodysplastic syndrome Mother     Cancer Mother         Breast CA, lumpectomy w/ nodes    Hypertension Father         Essential HTN    Hyperlipidemia Father     Myelodysplastic syndrome Father     Alcohol abuse Sister     Hypertension Sister         Essential HTN    Hyperlipidemia Sister     Migraines Sister         Migraine headaches    Sleep disorder Sister     Arthritis Sister         Rheumatoid/Psoriatic?    Hypertension Sister     Hypothyroidism Sister     Hyperlipidemia Sister     Thyroid disease Sister     Diabetes Paternal Aunt     Breast cancer Maternal Grandmother 74    Ovarian cancer Neg Hx     Colon cancer Neg Hx     Liver cancer Neg Hx     Rectal cancer Neg Hx     Stomach cancer Neg Hx       Social History     Socioeconomic History    Marital status:    Tobacco Use    Smoking status: Former     Packs/day: 0.50     Years: 30.00     Pack years: 15.00     Types: Cigarettes     Quit date: 2022     Years since quittin.4    Smokeless tobacco: Never    Tobacco comments:     last cigarette 4/15/2022   Vaping Use    Vaping Use: Never used   Substance and Sexual Activity    Alcohol use: Yes     Alcohol/week: 6.0 standard drinks     Types: 6 Cans of beer per week     Comment: Weekly    Drug use: No    Sexual activity: Not Currently     Partners: Male     Birth control/protection: Essure     Comment: Essure        Review of Systems   Constitutional: Negative.    HENT: Negative.     Eyes: Negative.    Respiratory: Negative.     Cardiovascular: Negative.    Gastrointestinal: Negative.    Endocrine: Negative.    Genitourinary: Negative.    Musculoskeletal:  Positive for arthralgias.   Skin: Negative.    Allergic/Immunologic: Negative.    Neurological: Negative.    Hematological: Negative.    Psychiatric/Behavioral: Negative.       The following portions of the patient's history were reviewed and updated as appropriate: allergies, current medications, past family history, past medical history, past  "social history, past surgical history, and problem list.    Physical Exam:   /79   Ht 158.8 cm (62.5\")   Wt 75.8 kg (167 lb 3.2 oz)   BMI 30.09 kg/m²   GENERAL: Body habitus: overweight    Lower extremity edema: Right: none; Left: none    Varicose veins:  Right: none; Left: none    Gait: normal     Mental Status:  awake and alert; oriented to person, place, and time    Voice:  clear  SKIN:  Lower extremity: Normal    Hair Growth(lower extremity):  Right:normal; Left:  normal  NAILS: Toenails: normal  HEENT: Head: Normocephalic, atraumatic,  without obvious abnormality.  eye: normal external eye, no icterus  ears:normal external ears  nose: normal external nose  PULM:  Repiratory effort normal  CV:  Dorsalis Pedis:  Right: 2+; Left:2+    Posterior Tibial: Right:2+; Left:2+    Capillary Refill:  Brisk  MSK:        Tibia:  ; Left:  non tender      Ankle:  ; Left:   Mildly tender at the origin of the plantar fascia today, nontender Achilles, she notes that she does have pain medially and lateral in the hindfoot at the end of the day, nothing that looks like a stress fracture      Foot:  ; Left:   See ankle exam      NEURO: Heel Walking:  Right:  normal; Left:  normal    Toe Walking:  Right:  normal; Left:  normal     [place] 5.07 monofilament test: normal    Lower extremity sensation: intact         Motor Function: all 5/5         Medical Decision Making    Data Review:   ordered and reviewed x-rays today    Assessment and Plan/ Diagnosis/Treatment options:   1. Plantar fasciitis  Diagnosis: plantar fasciitis:  I explained plantar fasciitis in detail to the patient.  I explained to the bow-string mechanism of the plantar fascia.  I went over the current research of the [employer] Society with them.  I explained the treatments that were not statistically significant in improving the problem including: injection of steroids, special orthotics, special shoes, surgery, medication, " "ice, not working, etc.    I explained the treatments that are statistically significant at improving the problem.  These include stretching/night splinting, casting.    I explained the most important treatment: Stretching and night splinting.  I went over the patient information sheet with them, I showed them the stretches and they were able to reproduce them.  I emphasized the \"toe pull\" as the most important stretch.  They need to do the stretches 5 repetitions each, 6-8 times per day.  I explained how to do them at work, at home, before getting out of bed, before getting out of the car, and before getting up from a chair.    We provided them with a night splint.    If they do not improve after 4 months of aggressive stretching and night splinting, the next step will be a short leg fiberglass walking cast worn for 6 weeks.    Preprinted education was provided to the patient.        .      2. Acquired hallux rigidus of left foot  She does have arthritic change in the first MTPJ but it is not symptomatic      Part of this encounter note is an electronic transcription/translation of spoken language to printed text. The electronic translation of spoken language may permit erroneous, or at times, nonsensical words or phrases to be inadvertently transcribed; Although I have reviewed the note for such errors, some may still exist.    NOTE TO PATIENT: The 21st Century Cures Act makes medical notes like these available to patients in the interest of transparency. However, be advised this is a medical document. It is intended as peer to peer communication. It is written in medical language and may contain abbreviations or verbiage that are unfamiliar. It may appear blunt or direct. Medical documents are intended to carry relevant information, facts as evident, and the clinical opinion of the practitioner.       Ivon Marrero MD    "

## 2023-09-18 ENCOUNTER — TELEPHONE (OUTPATIENT)
Dept: ORTHOPEDIC SURGERY | Facility: CLINIC | Age: 53
End: 2023-09-18
Payer: COMMERCIAL

## 2023-09-18 NOTE — TELEPHONE ENCOUNTER
I called patient back and let her know that it can javier normal to experience some discomfort with night splinting. I talked her through how to adjust the brace some to help with her comfort level. She will give that  a try and call back if get gets worse or no better. She had no further problems or questions today.     Arleen

## 2023-09-18 NOTE — TELEPHONE ENCOUNTER
"Patient called stating she was placed in a \"brace\" for left foot however patient started experiencing pain in the middle of the night. Patient asking if she should expect sourness and it get better once she starts exercising.     Ramy: [phone]   "

## 2023-09-20 ENCOUNTER — OFFICE VISIT (OUTPATIENT)
Dept: BEHAVIORAL HEALTH | Facility: CLINIC | Age: 53
End: 2023-09-20
Payer: COMMERCIAL

## 2023-09-20 VITALS
SYSTOLIC BLOOD PRESSURE: 140 MMHG | HEART RATE: 79 BPM | OXYGEN SATURATION: 99 % | WEIGHT: 170 LBS | DIASTOLIC BLOOD PRESSURE: 92 MMHG | HEIGHT: 63 IN | BODY MASS INDEX: 30.12 KG/M2

## 2023-09-20 DIAGNOSIS — F41.9 ANXIETY: ICD-10-CM

## 2023-09-20 DIAGNOSIS — F33.41 RECURRENT MAJOR DEPRESSIVE DISORDER, IN PARTIAL REMISSION: Primary | ICD-10-CM

## 2023-09-20 DIAGNOSIS — F90.9 ATTENTION DEFICIT HYPERACTIVITY DISORDER (ADHD), UNSPECIFIED ADHD TYPE: ICD-10-CM

## 2023-09-20 DIAGNOSIS — F51.05 INSOMNIA DUE TO OTHER MENTAL DISORDER: ICD-10-CM

## 2023-09-20 DIAGNOSIS — F99 INSOMNIA DUE TO OTHER MENTAL DISORDER: ICD-10-CM

## 2023-09-20 RX ORDER — QUETIAPINE FUMARATE 25 MG/1
25-50 TABLET, FILM COATED ORAL NIGHTLY PRN
Qty: 60 TABLET | Refills: 0 | Status: SHIPPED | OUTPATIENT
Start: 2023-09-20

## 2023-09-20 NOTE — PROGRESS NOTES
Follow Up Office Visit      Patient Name: Mona Wilkerson  : 1970   MRN: [id number]     Referring Provider: Muna Huitron MD    Chief Complaint:      ICD-10-CM ICD-9-CM   1. Recurrent major depressive disorder, in partial remission  F33.41 296.35   2. Anxiety  F41.9 300.00   3. Attention deficit hyperactivity disorder (ADHD), unspecified ADHD type  F90.9 314.01   4. Alcohol use  Z78.9 V49.89   5. Insomnia due to other mental disorder  F51.05 300.9    F99 327.02        History of Present Illness:   Mona Wilkerson is a 53 y.o. female who is here today for follow up and medication management    Subjective      Patient Reports: that she tried remeron for sleep and it did not work. She reports that she has tried so many things that haven't worked riend recommended seroquel. Feels like this in a good place. Denies SI/HI/AVH.    Review of Systems:   Review of Systems   Constitutional:  Negative for appetite change and unexpected weight change.   Eyes:  Negative for visual disturbance.   Respiratory:  Negative for chest tightness and shortness of breath.    Cardiovascular:  Negative for chest pain.   Musculoskeletal:  Negative for gait problem.   Skin:  Negative for rash and wound.   Neurological:  Negative for dizziness, tremors, seizures, weakness and light-headedness.   Psychiatric/Behavioral:  Negative for agitation, behavioral problems, confusion, decreased concentration, dysphoric mood, hallucinations, self-injury, sleep disturbance and suicidal ideas. The patient is not nervous/anxious and is not hyperactive.    Sleep pattern: still with difficulty, nothing has worked except for ambien, but doesn't really want to take ambien  Appetite: no change     Screening Scores:   PHQ-9 : 3  CARLEY-7 : 0    RISK ASSESSMENT:  Patient denies any thoughts or intent of suicide today. Patient denies any impulsive behavior today.     Medications:     Current Outpatient Medications:     clobetasol (TEMOVATE)  "0.05 % cream, Apply 1 application  topically to the appropriate area as directed 2 (Two) Times a Day., Disp: , Rfl:     estradiol (Vagifem) 10 MCG tablet vaginal tablet, Insert 1 tablet into the vagina 2 (Two) Times a Week., Disp: 24 tablet, Rfl: 3    fluconazole (DIFLUCAN) 150 MG tablet, Take 1st dose on day 1. Repeat 2nd dose in 72 hours of symptoms do not improve., Disp: 2 tablet, Rfl: 0    fluocinonide (LIDEX) 0.05 % external solution, Apply  topically to the appropriate area as directed Daily., Disp: , Rfl:     FLUoxetine (PROzac) 20 MG capsule, TAKE ONE CAPSULE BY MOUTH ONE TIME DAILY, Disp: 90 capsule, Rfl: 0    fluticasone (FLONASE) 50 MCG/ACT nasal spray, 2 sprays into the nostril(s) as directed by provider Every Night., Disp: , Rfl:     ketoconazole (NIZORAL) 2 % shampoo, , Disp: , Rfl:     levothyroxine (SYNTHROID, LEVOTHROID) 50 MCG tablet, Take 1 tablet by mouth Daily., Disp: 90 tablet, Rfl: 3    levothyroxine (SYNTHROID, LEVOTHROID) 50 MCG tablet, Take 1 tablet by mouth Daily., Disp: 90 tablet, Rfl: 3    lisinopril (PRINIVIL,ZESTRIL) 40 MG tablet, Take 1 tablet by mouth Daily., Disp: 90 tablet, Rfl: 3    lisinopril (PRINIVIL,ZESTRIL) 40 MG tablet, Take 1 tablet by mouth Daily., Disp: 90 tablet, Rfl: 2    nystatin (MYCOSTATIN) 100,000 unit/mL suspension, Take 5 mL by mouth 4 (Four) Times a Day., Disp: 473 mL, Rfl: 0    omeprazole (priLOSEC) 20 MG capsule, Take 1 capsule by mouth Daily., Disp: 90 capsule, Rfl: 1    Medication Considerations:  NEREIDA reviewed and appropriate.      Allergies:   Allergies   Allergen Reactions    Amlodipine Swelling           Objective     Physical Exam:  Vital Signs:   Vitals:    09/20/23 1516   Weight: 77.1 kg (170 lb)   Height: 158.8 cm (62.52\")     Body mass index is 30.58 kg/m².     Mental Status Exam:   Hygiene:   good  Cooperation:  Cooperative  Eye Contact:  Good  Psychomotor Behavior:  Appropriate  Affect:  Appropriate  Mood: normal  Speech:  Normal  Thought " Process:  Goal directed and Linear  Thought Content:  Normal  Suicidal:  None  Homicidal:  None  Hallucinations:  None  Delusion:  None  Memory:  Intact  Orientation:  Person, Place, Time, and Situation  Reliability:  good  Insight:  Good  Judgement:  Good  Impulse Control:  Good  Physical/Medical Issues:   see problem list      Assessment / Plan      Visit Diagnosis/Orders Placed This Visit:  Diagnoses and all orders for this visit:    1. Recurrent major depressive disorder, in partial remission (Primary)    2. Anxiety    3. Attention deficit hyperactivity disorder (ADHD), unspecified ADHD type    4. Alcohol use    5. Insomnia due to other mental disorder         Functional Status: No impairment    Prognosis: Good with Ongoing Treatment     Impression/Formulation:  Patient appeared alert and oriented.  Patient is voluntarily requesting to continue outpatient psychiatric treatment at [facility].  Patient is receptive to assistance with maintaining a stable lifestyle.  Patient presents with history of     ICD-10-CM ICD-9-CM   1. Recurrent major depressive disorder, in partial remission  F33.41 296.35   2. Anxiety  F41.9 300.00   3. Attention deficit hyperactivity disorder (ADHD), unspecified ADHD type  F90.9 314.01   4. Alcohol use  Z78.9 V49.89   5. Insomnia due to other mental disorder  F51.05 300.9    F99 327.02     Reviewed patient's previous provider notes. Patient meets DSM V diagnostic criteria for diagnoses. Diagnoses may be updated as more information becomes available.       Treatment Plan:   Continue prozac 20 mg daily  Begin seroquel 25-50 mg as needed for sleep  Follow up in one month or sooner if needed  Patient will continue supportive psychotherapy efforts and medications as indicated. Clinic will obtain release of information for current treatment team for continuity of care as needed. Patient will contact this office, call 911 or present to the nearest emergency room should  suicidal or homicidal ideations occur.  Discussed medication options and treatment plan of prescribed medication(s) as well as the risks, benefits, and potential side effects. Patient ackowledged and verbally consented to continue with current treatment plan and was educated on the importance of compliance with treatment and follow-up appointments.     Patient instructions:  All risks/benefits and side effects discussed with patient, including risk for weight gain, increased lipids, hyperprolactemia, EPS symptoms, including restlessness, muscle  stiffness or contraction of head, face, neck, trunk and limbs, and TD (which can be irreversible). Pt verbalizes understanding and consents to treatment with these medications.     Follow Up:   No follow-ups on file.        MARIA L Silverio, PMHNP-BC  [facility]

## 2023-10-19 ENCOUNTER — OFFICE VISIT (OUTPATIENT)
Dept: BEHAVIORAL HEALTH | Facility: CLINIC | Age: 53
End: 2023-10-19
Payer: COMMERCIAL

## 2023-10-19 VITALS
WEIGHT: 174 LBS | BODY MASS INDEX: 27.97 KG/M2 | SYSTOLIC BLOOD PRESSURE: 118 MMHG | HEIGHT: 66 IN | OXYGEN SATURATION: 98 % | DIASTOLIC BLOOD PRESSURE: 78 MMHG | HEART RATE: 70 BPM

## 2023-10-19 DIAGNOSIS — F99 INSOMNIA DUE TO OTHER MENTAL DISORDER: ICD-10-CM

## 2023-10-19 DIAGNOSIS — F33.41 RECURRENT MAJOR DEPRESSIVE DISORDER, IN PARTIAL REMISSION: Primary | ICD-10-CM

## 2023-10-19 DIAGNOSIS — F41.9 ANXIETY: ICD-10-CM

## 2023-10-19 DIAGNOSIS — F51.05 INSOMNIA DUE TO OTHER MENTAL DISORDER: ICD-10-CM

## 2023-10-19 RX ORDER — FLUOXETINE HYDROCHLORIDE 20 MG/1
20 CAPSULE ORAL DAILY
Qty: 90 CAPSULE | Refills: 0 | Status: SHIPPED | OUTPATIENT
Start: 2023-10-19

## 2023-10-19 RX ORDER — QUETIAPINE FUMARATE 25 MG/1
25-50 TABLET, FILM COATED ORAL NIGHTLY PRN
Qty: 60 TABLET | Refills: 2 | Status: SHIPPED | OUTPATIENT
Start: 2023-10-19

## 2023-10-19 NOTE — PROGRESS NOTES
Follow Up Office Visit      Patient Name: Mona Wilkerson  : 1970   MRN: [id number]     Referring Provider: Muna Huitron MD    Chief Complaint:      ICD-10-CM ICD-9-CM   1. Recurrent major depressive disorder, in partial remission  F33.41 296.35   2. Anxiety  F41.9 300.00   3. Insomnia due to other mental disorder  F51.05 300.9    F99 327.02        History of Present Illness:   Mona Wilkerson is a 53 y.o. female who is here today for follow up and medication management    Subjective      Patient Reports: that she is still having trouble with her sleep. She reports that she is taking 2 seroquel and 50 mg of benadryl. She reports that she is on her phone at night and she drinks a lot of caffeine. She reports that she is considering asking her gynecologist about progesterone, in case it helps with sleep. Denies SI/HI/AVH.     Review of Systems:   Review of Systems   Constitutional:  Negative for appetite change and unexpected weight change.   Eyes:  Negative for visual disturbance.   Respiratory:  Negative for chest tightness and shortness of breath.    Cardiovascular:  Negative for chest pain.   Musculoskeletal:  Negative for gait problem.   Skin:  Negative for rash and wound.   Neurological:  Negative for dizziness, tremors, seizures, weakness and light-headedness.   Psychiatric/Behavioral:  Positive for sleep disturbance. Negative for agitation, behavioral problems, confusion, decreased concentration, dysphoric mood, hallucinations, self-injury and suicidal ideas. The patient is not nervous/anxious and is not hyperactive.      Sleep pattern: wakes up in the middle of the night, not as exhausted during the day, gets 5-6 hours a night  Appetite: no changes   Caffeine: Drinks unsweet tea all day long, even late in the evening  Alcohol: 6- 12 drinks a week     Screening Scores:   PHQ-9 : 3  CARLEY-7 : 0    RISK ASSESSMENT:  Patient denies any thoughts or intent of suicide today. Patient denies  "any impulsive behavior today.     Medications:     Current Outpatient Medications:     clobetasol (TEMOVATE) 0.05 % cream, Apply 1 application  topically to the appropriate area as directed 2 (Two) Times a Day., Disp: , Rfl:     estradiol (Vagifem) 10 MCG tablet vaginal tablet, Insert 1 tablet into the vagina 2 (Two) Times a Week., Disp: 24 tablet, Rfl: 3    fluocinonide (LIDEX) 0.05 % external solution, Apply  topically to the appropriate area as directed Daily., Disp: , Rfl:     FLUoxetine (PROzac) 20 MG capsule, Take 1 capsule by mouth Daily., Disp: 90 capsule, Rfl: 0    ketoconazole (NIZORAL) 2 % shampoo, , Disp: , Rfl:     levothyroxine (SYNTHROID, LEVOTHROID) 50 MCG tablet, Take 1 tablet by mouth Daily., Disp: 90 tablet, Rfl: 3    levothyroxine (SYNTHROID, LEVOTHROID) 50 MCG tablet, Take 1 tablet by mouth Daily., Disp: 90 tablet, Rfl: 3    lisinopril (PRINIVIL,ZESTRIL) 40 MG tablet, Take 1 tablet by mouth Daily., Disp: 90 tablet, Rfl: 3    lisinopril (PRINIVIL,ZESTRIL) 40 MG tablet, Take 1 tablet by mouth Daily., Disp: 90 tablet, Rfl: 2    omeprazole (priLOSEC) 20 MG capsule, Take 1 capsule by mouth Daily., Disp: 90 capsule, Rfl: 1    QUEtiapine (SEROquel) 25 MG tablet, Take 1-2 tablet(s) by mouth at night as needed for sleep, Disp: 60 tablet, Rfl: 2    Medication Considerations:  NEREIDA reviewed and appropriate.      Allergies:   Allergies   Allergen Reactions    Amlodipine Swelling           Objective     Physical Exam:  Vital Signs:   Vitals:    10/19/23 1112 10/19/23 1113   BP:  118/78   Pulse:  70   SpO2:  98%   Weight: 78.9 kg (174 lb)    Height: 166.4 cm (65.52\")      Body mass index is 28.5 kg/m².     Mental Status Exam:   Hygiene:   good  Cooperation:  Cooperative  Eye Contact:  Good  Psychomotor Behavior:  Appropriate  Affect:  Appropriate  Mood: normal  Speech:  Normal  Thought Process:  Goal directed and Linear  Thought Content:  Normal  Suicidal:  None  Homicidal:  None  Hallucinations:  " None  Delusion:  None  Memory:  Intact  Orientation:  Person, Place, Time, and Situation  Reliability:  good  Insight:  Good  Judgement:  Good  Impulse Control:  Fair  Physical/Medical Issues:   see problem list      Assessment / Plan      Visit Diagnosis/Orders Placed This Visit:  Diagnoses and all orders for this visit:    1. Recurrent major depressive disorder, in partial remission (Primary)  -     FLUoxetine (PROzac) 20 MG capsule; Take 1 capsule by mouth Daily.  Dispense: 90 capsule; Refill: 0    2. Anxiety  -     FLUoxetine (PROzac) 20 MG capsule; Take 1 capsule by mouth Daily.  Dispense: 90 capsule; Refill: 0    3. Insomnia due to other mental disorder  -     QUEtiapine (SEROquel) 25 MG tablet; Take 1-2 tablet(s) by mouth at night as needed for sleep  Dispense: 60 tablet; Refill: 2         Functional Status: No impairment    Prognosis: Good with Ongoing Treatment     Impression/Formulation:  Patient appeared alert and oriented.  Patient is voluntarily requesting to continue outpatient psychiatric treatment at [facility].  Patient is receptive to assistance with maintaining a stable lifestyle.  Patient presents with history of     ICD-10-CM ICD-9-CM   1. Recurrent major depressive disorder, in partial remission  F33.41 296.35   2. Anxiety  F41.9 300.00   3. Insomnia due to other mental disorder  F51.05 300.9    F99 327.02     Reviewed patient's previous provider notes. Patient meets DSM V diagnostic criteria for diagnoses. Diagnoses may be updated as more information becomes available.       Treatment Plan:   Continue prozac 20 mg daily  Continue seroquel 25-50 mg at bedtime  Work on sleep hygiene, including putting phone down at certain time  Decrease caffeine intake  Patient will continue supportive psychotherapy efforts and medications as indicated. Clinic will obtain release of information for current treatment team for continuity of care as needed. Patient will contact this office,  call 911 or present to the nearest emergency room should suicidal or homicidal ideations occur.  Discussed medication options and treatment plan of prescribed medication(s) as well as the risks, benefits, and potential side effects. Patient ackowledged and verbally consented to continue with current treatment plan and was educated on the importance of compliance with treatment and follow-up appointments.     Patient instructions:  Medication risks and side effects discussed with patient including risk for worsening mood, changes in behavior, thoughts of suicide or homicide, induction of sal, serotonin syndrome.   If any thoughts of SI or HI, worsening mood or changes in behavior, call 911 or crisis line [phone], or go to nearest ER at once. Pt.verbalizes understanding and consents to treatment with this medication.     Follow Up:   Return in about 2 months (around 12/19/2023) for Med Check.        MARIA L Silverio, PMHNP-BC [facility]

## 2023-12-21 ENCOUNTER — OFFICE VISIT (OUTPATIENT)
Dept: BEHAVIORAL HEALTH | Facility: CLINIC | Age: 53
End: 2023-12-21
Payer: COMMERCIAL

## 2023-12-21 VITALS
SYSTOLIC BLOOD PRESSURE: 132 MMHG | BODY MASS INDEX: 28.93 KG/M2 | WEIGHT: 180 LBS | HEIGHT: 66 IN | OXYGEN SATURATION: 98 % | HEART RATE: 81 BPM | DIASTOLIC BLOOD PRESSURE: 76 MMHG

## 2023-12-21 DIAGNOSIS — F90.9 ATTENTION DEFICIT HYPERACTIVITY DISORDER (ADHD), UNSPECIFIED ADHD TYPE: ICD-10-CM

## 2023-12-21 DIAGNOSIS — F41.9 ANXIETY: ICD-10-CM

## 2023-12-21 DIAGNOSIS — F33.41 RECURRENT MAJOR DEPRESSIVE DISORDER, IN PARTIAL REMISSION: Primary | ICD-10-CM

## 2023-12-21 DIAGNOSIS — F99 INSOMNIA DUE TO OTHER MENTAL DISORDER: ICD-10-CM

## 2023-12-21 DIAGNOSIS — F51.05 INSOMNIA DUE TO OTHER MENTAL DISORDER: ICD-10-CM

## 2023-12-21 RX ORDER — FLUOXETINE HYDROCHLORIDE 40 MG/1
40 CAPSULE ORAL DAILY
Qty: 30 CAPSULE | Refills: 1 | Status: SHIPPED | OUTPATIENT
Start: 2023-12-21

## 2023-12-21 NOTE — PROGRESS NOTES
Follow Up Office Visit      Patient Name: Mona Wilkerson  : 1970   MRN: [id number]     Referring Provider: Muna Huitron MD    Chief Complaint:      ICD-10-CM ICD-9-CM   1. Recurrent major depressive disorder, in partial remission  F33.41 296.35   2. Anxiety  F41.9 300.00   3. Insomnia due to other mental disorder  F51.05 300.9    F99 327.02   4. Attention deficit hyperactivity disorder (ADHD), unspecified ADHD type  F90.9 314.01        History of Present Illness:   Mona Wilkerson is a 53 y.o. female who is here today for follow up and medication management    Subjective      Patient Reports: reports that she is just doing ok. She reports that she is going on a cruise to [facility] on two week cruise . She reporst that she struggles going to work every day. She reports that people irritate her easily and she is easily agitated. She reports that she hates feeling bad for herself. She reports that she doesn't really have a lot of friends and doesn't like living here in [place]. She reporsts that she is guarded here and other people make her feel bad for living back at home with her parents even though she is just helping to care for them and they need her. She reports that she is anxious about going on the cruise because her one friend backed out and she will be rooming with someone she doesn't know. She reports that she is drinking 6-8 beers a night 6 nights a week. She reports that in  she did rehab voluntarily.  She reports that her dad was an alcoholic and she feels like she is doing exactly what he did. Denies SI/HI/AVH.     Review of Systems:   Review of Systems   Constitutional:  Negative for appetite change and unexpected weight change.   Eyes:  Negative for visual disturbance.   Respiratory:  Negative for chest tightness and shortness of breath.    Cardiovascular:  Negative for chest pain.   Musculoskeletal:  Negative for gait problem.   Skin:  Negative for rash and  wound.   Neurological:  Negative for dizziness, tremors, seizures, weakness and light-headedness.   Psychiatric/Behavioral:  Positive for dysphoric mood and sleep disturbance. Negative for agitation, behavioral problems, confusion, decreased concentration, hallucinations, self-injury and suicidal ideas. The patient is nervous/anxious. The patient is not hyperactive.      Sleep pattern: She reports that she is not taking sleep medication on nights that she drinks which is 6 nights a week. She reports that she is struggling with sleep  Appetite: no changes   Caffeine: has cut back on the caffeine a little bit  Alcohol: 6-8 beers a night 6 nights a week    Screening Scores:   PHQ-9 : 9  CARLEY-7 : 9    RISK ASSESSMENT:  Patient denies any thoughts or intent of suicide today. Patient denies any impulsive behavior today.     Medications:     Current Outpatient Medications:     clobetasol (TEMOVATE) 0.05 % cream, Apply 1 application  topically to the appropriate area as directed 2 (Two) Times a Day., Disp: , Rfl:     estradiol (Vagifem) 10 MCG tablet vaginal tablet, Insert 1 tablet into the vagina 2 (Two) Times a Week., Disp: 24 tablet, Rfl: 3    fluocinonide (LIDEX) 0.05 % external solution, Apply  topically to the appropriate area as directed Daily., Disp: , Rfl:     ketoconazole (NIZORAL) 2 % shampoo, , Disp: , Rfl:     levothyroxine (SYNTHROID, LEVOTHROID) 50 MCG tablet, Take 1 tablet by mouth Daily., Disp: 90 tablet, Rfl: 3    levothyroxine (SYNTHROID, LEVOTHROID) 50 MCG tablet, Take 1 tablet by mouth Daily., Disp: 90 tablet, Rfl: 3    lisinopril (PRINIVIL,ZESTRIL) 40 MG tablet, Take 1 tablet by mouth Daily., Disp: 90 tablet, Rfl: 3    lisinopril (PRINIVIL,ZESTRIL) 40 MG tablet, Take 1 tablet by mouth Daily., Disp: 90 tablet, Rfl: 2    omeprazole (priLOSEC) 20 MG capsule, Take 1 capsule by mouth Daily., Disp: 90 capsule, Rfl: 1    QUEtiapine (SEROquel) 25 MG tablet, Take 1-2 tablet(s) by mouth at night as needed for  "sleep, Disp: 60 tablet, Rfl: 2    FLUoxetine (PROzac) 40 MG capsule, Take 1 capsule by mouth Daily., Disp: 30 capsule, Rfl: 1    Medication Considerations:  NEREIDA reviewed and appropriate.      Allergies:   Allergies   Allergen Reactions    Amlodipine Swelling         Objective     Physical Exam:  Vital Signs:   Vitals:    12/21/23 1619 12/21/23 1621   BP:  132/76   Pulse:  81   SpO2:  98%   Weight: 81.6 kg (180 lb)    Height: 166.4 cm (65.52\")      Body mass index is 29.48 kg/m².     Mental Status Exam:   Hygiene:   good  Cooperation:  Cooperative  Eye Contact:  Good  Psychomotor Behavior:  Restless  Affect:   tearful  Mood: sad and depressed  Speech:  Normal  Thought Process:  Circum  Thought Content:  Normal  Suicidal:  None  Homicidal:  None  Hallucinations:  None  Delusion:  None  Memory:  Intact  Orientation:  Person, Place, Time, and Situation  Reliability:  fair  Insight:  Fair  Judgement:  Fair  Impulse Control:  Poor  Physical/Medical Issues:   see problem list      Assessment / Plan      Visit Diagnosis/Orders Placed This Visit:  Diagnoses and all orders for this visit:    1. Recurrent major depressive disorder, in partial remission (Primary)  -     FLUoxetine (PROzac) 40 MG capsule; Take 1 capsule by mouth Daily.  Dispense: 30 capsule; Refill: 1    2. Anxiety  -     FLUoxetine (PROzac) 40 MG capsule; Take 1 capsule by mouth Daily.  Dispense: 30 capsule; Refill: 1    3. Insomnia due to other mental disorder  -     FLUoxetine (PROzac) 40 MG capsule; Take 1 capsule by mouth Daily.  Dispense: 30 capsule; Refill: 1    4. Attention deficit hyperactivity disorder (ADHD), unspecified ADHD type         Functional Status: Moderate impairment     Prognosis: Good with Ongoing Treatment     Impression/Formulation:  Patient appeared alert and oriented.  Patient is voluntarily requesting to continue outpatient psychiatric treatment at [facility].  Patient is receptive to assistance with " maintaining a stable lifestyle.  Patient presents with history of     ICD-10-CM ICD-9-CM   1. Recurrent major depressive disorder, in partial remission  F33.41 296.35   2. Anxiety  F41.9 300.00   3. Insomnia due to other mental disorder  F51.05 300.9    F99 327.02   4. Attention deficit hyperactivity disorder (ADHD), unspecified ADHD type  F90.9 314.01     Reviewed patient's previous provider notes. Patient meets DSM V diagnostic criteria for diagnoses. Diagnoses may be updated as more information becomes available.       Treatment Plan:   Increase prozac to 40 mg daily  Avoid alcohol and sleep medications  Advised on decreasing alcohol intake  Begin individual therapy  Follow up in 1 month or sooner if needed  Patient will continue supportive psychotherapy efforts and medications as indicated. Clinic will obtain release of information for current treatment team for continuity of care as needed. Patient will contact this office, call 911 or present to the nearest emergency room should suicidal or homicidal ideations occur.  Discussed medication options and treatment plan of prescribed medication(s) as well as the risks, benefits, and potential side effects. Patient ackowledged and verbally consented to continue with current treatment plan and was educated on the importance of compliance with treatment and follow-up appointments.     Patient instructions:  Instructed will take 4-6 weeks for full therapeutic effect. Medication risks and side effects discussed with patient including risk for worsening mood, changes in behavior, thoughts of suicide or homicide, induction of sal, serotonin syndrome.   If any thoughts of SI or HI, worsening mood or changes in behavior, call 911 or crisis line [phone], or go to nearest ER at once. Pt.verbalizes understanding and consents to treatment with this medication.     Follow Up:   Return in about 1 month (around 1/21/2024) for Med Check.        MARIA L Silverio, PMHNP-BC  [facility]

## 2023-12-22 ENCOUNTER — TELEPHONE (OUTPATIENT)
Dept: INTERNAL MEDICINE | Facility: CLINIC | Age: 53
End: 2023-12-22
Payer: COMMERCIAL

## 2023-12-22 NOTE — TELEPHONE ENCOUNTER
She is now seeing you for her mood and related insomnia so I am forwarding this request for your consideration. Thanks.

## 2023-12-22 NOTE — TELEPHONE ENCOUNTER
NERY CALLED AND WOULD LIKE TO GO BACK ON 100MG TRAZODONE SENT TO [facility]. CALL HER IF THERE ARE ANY QUESTIONS. [phone]

## 2023-12-28 ENCOUNTER — OFFICE VISIT (OUTPATIENT)
Dept: BEHAVIORAL HEALTH | Facility: CLINIC | Age: 53
End: 2023-12-28
Payer: COMMERCIAL

## 2023-12-28 DIAGNOSIS — F10.20 ALCOHOL USE DISORDER, MODERATE, DEPENDENCE: ICD-10-CM

## 2023-12-28 DIAGNOSIS — F41.1 GENERALIZED ANXIETY DISORDER: ICD-10-CM

## 2023-12-28 DIAGNOSIS — F33.9 EPISODE OF RECURRENT MAJOR DEPRESSIVE DISORDER, UNSPECIFIED DEPRESSION EPISODE SEVERITY: Primary | ICD-10-CM

## 2023-12-28 NOTE — PROGRESS NOTES
[facility]                  Initial Assessment      Initial Adult Note     Date:2023   Patient Name: Mona Wilkerson  : 1970   MRN: [id number]   Time IN: 1:53 pm        Time OUT: 2:49 pm     Referring Provider: Muna Huitron MD    Chief Complaint:      ICD-10-CM ICD-9-CM   1. Episode of recurrent major depressive disorder, unspecified depression episode severity  F33.9 296.30   2. Generalized anxiety disorder  F41.1 300.02   3. Alcohol use disorder, moderate, dependence  F10.20 303.90        History of Present Illness:   Mona Wilkerson is a 53 y.o. female who is being seen today for counseling for depression, anxiety and alcohol use disorder.      Past Psychiatric History:   Inpatient for alcohol in 2019.  Past positive experiences with outpatient behavioral health therapy.    Subjective      Review of Systems    PHQ-9: Brief Depression Severity Measure Score: 15    CARLEY 7 Total Score: 12    Patient's Support Network Includes:  parents    Functional Status: Mild impairment     Significant Life Events:   Verbal, physical, sexual abuse? No  Has patient experienced a death / loss of relationship? Yes  Has patient experienced a major accident or tragic events? Yes    Work History:   Highest level of education obtained: college  Ever been active duty in the ? No  Patient's Occupation: Nurse    Interpersonal/Relational:  Marital Status: single  Support system: extended family    Mental/Behavioral Health History:  History of prior treatment or hospitalization: Inpatient treatment for alcohol misuse in 2019.  Previous outpatient therapy with positive results.  Past diagnoses: Anxiety, depression, alcohol use disorder  Are there any significant health issues (current or past): See problem list  History of seizures: No    Family Psychiatric History:  Alcoholism (father, runs in mother's family)    History of Substance Use:  Patient  answered: Currently drinks 6-8 beers or alcoholic seltzer 6 times a week    Triggers: (Persons/Places/Things/Events/Thought/Emotions): Internal and external stimuli related to work and familial stress.    Social History     Socioeconomic History    Marital status:    Tobacco Use    Smoking status: Former     Packs/day: 0.50     Years: 30.00     Additional pack years: 0.00     Total pack years: 15.00     Types: Cigarettes     Quit date: 2022     Years since quittin.7    Smokeless tobacco: Never    Tobacco comments:     last cigarette 4/15/2022   Vaping Use    Vaping Use: Never used   Substance and Sexual Activity    Alcohol use: Yes     Alcohol/week: 6.0 standard drinks of alcohol     Types: 6 Cans of beer per week     Comment: Weekly    Drug use: No    Sexual activity: Not Currently     Partners: Male     Birth control/protection: Essure     Comment: Essure        Past Medical History:   Diagnosis Date    Abnormal Pap smear of cervix     ADHD     Cervical disc disorder     Colon polyp 2018    Cyst of ovary 2016    Depression     Depression with anxiety 2016    seeing [facility]- doing well  off lithium  changed to lamictal getting adhd testing    GERD (gastroesophageal reflux disease)     Hyperlipidemia 2016  reviewed lipid panel with her     Hypertension 10/07/2022    Hypothyroidism     Low back pain     Lumbar radiculopathy 2023    Lumbosacral disc disease     Nausea and vomiting 2022    Primary insomnia 2016    Sleep difficulties     Stage 3a chronic kidney disease 2021    Tobacco use 2021       Past Surgical History:   Procedure Laterality Date    COLONOSCOPY      COLONOSCOPY W/ POLYPECTOMY  10/29/2021    Dr. Nice, diverticulosis and removal of tubular adenoma    ENDOMETRIAL ABLATION W/ NOVASURE      ENDOSCOPY  2018    ESSURE TUBAL LIGATION      EYE SURGERY  2002    Lasik    LASIK Bilateral     LIPOSUCTION      RHINOPLASTY         Family  History   Problem Relation Age of Onset    Sleep disorder Mother     Thyroid disease Mother     Breast cancer Mother 53    Hypertension Mother         Essential HTN    Hyperlipidemia Mother     Myelodysplastic syndrome Mother     Cancer Mother         Breast CA, lumpectomy w/ nodes    Hypertension Father         Essential HTN    Hyperlipidemia Father     Myelodysplastic syndrome Father     Alcohol abuse Sister     Hypertension Sister         Essential HTN    Hyperlipidemia Sister     Migraines Sister         Migraine headaches    Sleep disorder Sister     Arthritis Sister         Rheumatoid/Psoriatic?    Hypertension Sister     Hypothyroidism Sister     Hyperlipidemia Sister     Thyroid disease Sister     Diabetes Paternal Aunt     Breast cancer Maternal Grandmother 74    Ovarian cancer Neg Hx     Colon cancer Neg Hx     Liver cancer Neg Hx     Rectal cancer Neg Hx     Stomach cancer Neg Hx          Current Outpatient Medications:     clobetasol (TEMOVATE) 0.05 % cream, Apply 1 application  topically to the appropriate area as directed 2 (Two) Times a Day., Disp: , Rfl:     estradiol (Vagifem) 10 MCG tablet vaginal tablet, Insert 1 tablet into the vagina 2 (Two) Times a Week., Disp: 24 tablet, Rfl: 3    fluocinonide (LIDEX) 0.05 % external solution, Apply  topically to the appropriate area as directed Daily., Disp: , Rfl:     FLUoxetine (PROzac) 40 MG capsule, Take 1 capsule by mouth Daily., Disp: 30 capsule, Rfl: 1    ketoconazole (NIZORAL) 2 % shampoo, , Disp: , Rfl:     levothyroxine (SYNTHROID, LEVOTHROID) 50 MCG tablet, Take 1 tablet by mouth Daily., Disp: 90 tablet, Rfl: 3    levothyroxine (SYNTHROID, LEVOTHROID) 50 MCG tablet, Take 1 tablet by mouth Daily., Disp: 90 tablet, Rfl: 3    lisinopril (PRINIVIL,ZESTRIL) 40 MG tablet, Take 1 tablet by mouth Daily., Disp: 90 tablet, Rfl: 3    lisinopril (PRINIVIL,ZESTRIL) 40 MG tablet, Take 1 tablet by mouth Daily., Disp: 90 tablet, Rfl: 2    omeprazole (priLOSEC) 20  MG capsule, Take 1 capsule by mouth Daily., Disp: 90 capsule, Rfl: 1    QUEtiapine (SEROquel) 25 MG tablet, Take 1-2 tablet(s) by mouth at night as needed for sleep, Disp: 60 tablet, Rfl: 2    Allergies   Allergen Reactions    Amlodipine Swelling       Objective     Physical Exam:  Vital Signs: There were no vitals filed for this visit.  There is no height or weight on file to calculate BMI.     Physical Exam    Mental Status Exam:   Hygiene:   good  Cooperation:  Cooperative  Eye Contact:  Good  Psychomotor Behavior:  Appropriate  Affect:  Full range  Mood: normal  Speech:  Normal  Thought Process:  Goal directed  Thought Content:  Normal  Suicidal:  None  Homicidal:  None  Hallucinations:  None  Delusion:  None  Memory:  Intact  Orientation:  Person, Place, Time, and Situation  Reliability:  good  Insight:  Good  Judgement:  Good  Impulse Control:  Fair  Physical/Medical Issues:   See problem list      SUICIDE RISK ASSESSMENT/CSSRS:  1. Does patient have thoughts of suicide? no  2. Does patient have intent for suicide? no  3. Does patient have a current plan for suicide? no  4. History of suicide attempts: no  5. Family history of suicide or attempts: no  6. History of violent behaviors towards others or property or thoughts of committing suicide: no  7. History of sexual aggression toward others: no  8. Access to firearms or weapons: no    Assessment / Plan      Diagnosis  Diagnoses and all orders for this visit:    1. Episode of recurrent major depressive disorder, unspecified depression episode severity (Primary)    2. Generalized anxiety disorder    3. Alcohol use disorder, moderate, dependence    Patient presented for intake with clinician.  Patient currently resides with their elderly parents in [facility].  Patient states that they are currently taking care of their parents who are elderly and inclining health.  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   Patient reports the duration of employment as approximately 2 years patient reports seeking therapy due to symptoms of anxiety and depression that seem to be worsening.  Patient reports previous success with outpatient therapy.  Patient reports inpatient treatment for alcohol use disorder in .  Patient reports a period of sobriety of approximately 7 days before using alcohol again after discharge from inpatient program.  Patient reports a previous diagnosis of adult ADHD and taking Adderall until moving to [place] in .  Patient reports that their sister  of complications related to alcoholism in .  Patient reports a family history of alcoholism on both her mother and father's side.  Patient reports that their father is in long-term recovery.  Patient reports often feeling marginalized and unappreciated at work.  Patient reports that they have started drinking to cope with the feelings of alienation at work, stress of taking care of other patients as well as difficulty sleeping.  Patient reports period of not working from 2020 until  due to their parents being immunocompromised and their desire for them not to contract the COVID virus.  Patient reports a period of suicidal ideation while previously working at [employer].  Patient reports that they were hospitalized and prescribed lithium at that time.  Symptoms of depression to note are as follows: Anhedonia, depression or hopelessness, irregular sleep pattern, fatigue, poor appetite marked by eating small portions and avoiding eating in the evening order to enable alcohol consumption, trouble concentrating.  Symptoms of anxiety denote are as follows: Nervousness or anxiousness, uncontrollable worry, worrying too much about different things and becoming easily annoyed or irritated.    Prognosis  Intake completed.  Prognosis dependent on continuity of treatment.    Progress toward goal: Long-term goal of independent management of  symptoms related to depression and anxiety identified by patient.  Long-term goal of abstaining or limiting alcohol use identified by patient.    PLAN:   Clinician introduced a cognitive behavioral intervention which focuses on mindfulness and Socratic questioning.  Patient was receptive to intervention.         Safety: No acute safety concerns  Risk Assessment: Risk of self-harm acutely is low. Risk of self-harm chronically is also low, but could be further elevated in the event of treatment noncompliance and/or AODA.    Treatment Plan/Goals: Continue supportive psychotherapy efforts and medications as indicated. Treatment and medication options discussed during today's visit. Patient ackowledged and verbally consented to continue with current treatment plan and was educated on the importance of compliance with treatment and follow-up appointments. Patient seems reasonably able to adhere to treatment plan.      Assisted Patient in processing above session content; acknowledged and normalized patient’s thoughts, feelings, and concerns.  Rationalized patient thought process regarding depression and anxiety and alcohol use disorder..      Allowed Patient to freely discuss issues  without interruption or judgement with unconditional positive regard, active listening skills, and empathy. Therapist provided a safe, confidential environment to facilitate the development of a positive therapeutic relationship and encouraged open, honest communication. Assisted Patient in identifying risk factors which would indicate the need for higher level of care including thoughts to harm self or others and/or self-harming behavior and encouraged Patient to contact this office, call 911, or present to the nearest emergency room should any of these events occur. Discussed crisis intervention services and means to access. Patient adamantly and convincingly denies current suicidal or homicidal ideation or perceptual disturbance. Assisted  Patient in processing session content; acknowledged and normalized Patient’s thoughts, feelings, and concerns by utilizing a person-centered approach in efforts to build appropriate rapport and a positive therapeutic relationship with open and honest communication.     Part of this note may be an electronic transcription/translation of spoken language to printed text using the Dragon Dictation System.       Follow Up:   Return in about 1 month (around 1/28/2024).    Donato Martinez LCSW

## 2024-01-09 ENCOUNTER — OUTSIDE FACILITY SERVICE (OUTPATIENT)
Dept: GASTROENTEROLOGY | Facility: CLINIC | Age: 54
End: 2024-01-09
Payer: COMMERCIAL

## 2024-01-09 PROCEDURE — 88305 TISSUE EXAM BY PATHOLOGIST: CPT

## 2024-01-10 ENCOUNTER — LAB REQUISITION (OUTPATIENT)
Dept: LAB | Facility: HOSPITAL | Age: 54
End: 2024-01-10
Payer: COMMERCIAL

## 2024-01-10 DIAGNOSIS — R11.2 NAUSEA WITH VOMITING, UNSPECIFIED: ICD-10-CM

## 2024-01-10 DIAGNOSIS — K31.7 POLYP OF STOMACH AND DUODENUM: ICD-10-CM

## 2024-01-10 DIAGNOSIS — K21.9 GASTRO-ESOPHAGEAL REFLUX DISEASE WITHOUT ESOPHAGITIS: ICD-10-CM

## 2024-01-10 DIAGNOSIS — K22.89 OTHER SPECIFIED DISEASE OF ESOPHAGUS: ICD-10-CM

## 2024-01-10 DIAGNOSIS — K44.9 DIAPHRAGMATIC HERNIA WITHOUT OBSTRUCTION OR GANGRENE: ICD-10-CM

## 2024-01-10 DIAGNOSIS — K29.70 GASTRITIS, UNSPECIFIED, WITHOUT BLEEDING: ICD-10-CM

## 2024-01-11 LAB — REF LAB TEST METHOD: NORMAL

## 2024-01-11 NOTE — PROGRESS NOTES
Internal Medicine New Patient  Mona Wilkerson is a 51 y.o. female who presents today to establish care and with concerns as outlined below.    Chief Complaint  Chief Complaint   Patient presents with   • Establish Care   • Urinary Incontinence        HPI  Ms. Wilkerson comes in today to establish care. She has not had a recent steady PCP. She has previously seen Dr. Yu for her hypothyroidism but has not been there recently due to moving. She moved back to the area within the last year. In regards to her hypothyroidism she takes levothyroxine 50mcg daily. She denies history of thyroid nodules. In regards to other medications, she takes fluoxetine 40mg daily and trazodone 50-100mg qhs for anxiety, depression, and related insomnia. She notes struggling with her mental health since her sister passed away around 2017. She started to drink a 6 pack daily and at one point wrote a suicide note. She reports that she had no plan. She went to the [facility] for inpatient rehab. She now feels that she is doing better mentally. She is no longer seeing psychiatry or counseling. She still seems to struggle with depressed mood and is tearful in office. 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 She additionally notes issues with urinary incontinence both with sneezing, coughing, laughing but also with urgency. She has a habit of drinking tea throughout the day as well as alcohol as noted. She has tried to cut back on the caffeine content of her tea and notes some improvement. She has been resistant to wearing pantiliners and has had accidents when out. She wishes to avoid medications if possible. She is chronically on PPI for GERD and notes hx of vitamin D deficiency secondary to this. She is not currently on a supplement. She is due for pap smear, colonoscopy, and mammogram. She is a current smoker and also needs LDCT.       Review of  Systems  Review of Systems   Constitutional: Negative.    Eyes: Negative.    Respiratory: Negative.    Cardiovascular: Positive for leg swelling (occasional). Negative for chest pain and palpitations.   Gastrointestinal: Negative.    Genitourinary: Positive for urinary incontinence.   Musculoskeletal: Negative.    Skin: Negative.    Neurological: Negative.    Psychiatric/Behavioral: Positive for sleep disturbance, depressed mood and stress. Negative for self-injury and suicidal ideas. The patient is nervous/anxious.         Past Medical History  Past Medical History:   Diagnosis Date   • Depression    • GERD (gastroesophageal reflux disease)    • Hypothyroidism    • Sleep difficulties         Surgical History  Past Surgical History:   Procedure Laterality Date   • ENDOMETRIAL ABLATION W/ NOVASURE     • ESSURE TUBAL LIGATION     • LIPOSUCTION     • RHINOPLASTY          Family History  Family History   Problem Relation Age of Onset   • Thyroid disease Mother    • Breast cancer Mother 53   • Hypertension Mother         Essential HTN   • Hyperlipidemia Mother    • Alcohol abuse Sister    • Hypertension Sister         Essential HTN   • Hyperlipidemia Sister    • Migraines Sister         Migraine headaches   • Diabetes Paternal Aunt    • Hypertension Father         Essential HTN   • Hyperlipidemia Father    • Breast cancer Maternal Grandmother         Social History  Social History     Socioeconomic History   • Marital status:      Spouse name: Not on file   • Number of children: Not on file   • Years of education: Not on file   • Highest education level: Not on file   Tobacco Use   • Smoking status: Current Every Day Smoker     Packs/day: 0.50     Types: Cigarettes   • Smokeless tobacco: Never Used   • Tobacco comment: Tobacco use   Vaping Use   • Vaping Use: Never used   Substance and Sexual Activity   • Alcohol use: Yes     Comment: 5    • Drug use: No   • Sexual activity: Not Currently     Partners: Male      "Birth control/protection: None        Current Medications  Current Outpatient Medications on File Prior to Visit   Medication Sig Dispense Refill   • FLUoxetine (PROzac) 40 MG capsule Take 1 capsule by mouth Daily.     • levothyroxine (SYNTHROID, LEVOTHROID) 50 MCG tablet TAKE 1 TABLET BY MOUTH EVERY DAY 30 tablet 0   • omeprazole (priLOSEC) 20 MG capsule Take 20 mg by mouth Daily.     • traZODone (DESYREL) 100 MG tablet Take 1 tablet by mouth Daily.     • BIOTIN ULTRA STRENGTH PO Take  by mouth.     • Cholecalciferol (VITAMIN D3) 5000 UNITS capsule capsule Take  by mouth.     • [DISCONTINUED] ranitidine (ZANTAC) 75 MG tablet Take 1 tablet by mouth 2 (two) times a day.       No current facility-administered medications on file prior to visit.       Allergies  No Known Allergies     Objective  Visit Vitals  /82   Pulse 73   Temp 96.9 °F (36.1 °C)   Resp 16   Ht 160 cm (63\")   Wt 83.6 kg (184 lb 3.2 oz)   SpO2 97%   BMI 32.63 kg/m²        Physical Exam  Physical Exam  Vitals and nursing note reviewed.   Constitutional:       General: She is not in acute distress.     Appearance: She is well-developed. She is obese. She is not ill-appearing or diaphoretic.   HENT:      Head: Normocephalic and atraumatic.      Right Ear: External ear normal.      Left Ear: External ear normal.      Nose: Nose normal.   Eyes:      General: No scleral icterus.     Extraocular Movements: Extraocular movements intact.      Conjunctiva/sclera: Conjunctivae normal.   Neck:      Thyroid: No thyroid mass, thyromegaly or thyroid tenderness.   Cardiovascular:      Rate and Rhythm: Normal rate and regular rhythm.      Heart sounds: Normal heart sounds. No murmur heard.     Pulmonary:      Effort: Pulmonary effort is normal. No respiratory distress.      Breath sounds: Normal breath sounds.   Abdominal:      General: Bowel sounds are normal. There is no distension.      Palpations: Abdomen is soft.      Tenderness: There is no abdominal " tenderness.   Musculoskeletal:         General: No deformity.      Cervical back: Neck supple.      Right lower leg: No edema.      Left lower leg: No edema.   Skin:     General: Skin is warm and dry.      Findings: No rash.   Neurological:      General: No focal deficit present.      Mental Status: She is alert and oriented to person, place, and time.      Gait: Gait normal.   Psychiatric:         Mood and Affect: Mood is depressed. Affect is tearful.         Behavior: Behavior normal.         Thought Content: Thought content normal.         Judgment: Judgment normal.          Results  No results associated with this encounter.     Assessment and Plan  Diagnoses and all orders for this visit:    Mixed urge and stress incontinence  - Discussed cutting back on tea and alcohol intake, drink more water.  - Use incontinence pads PRN  - Will refer to pelvic floor PT  - She defers medications today    Family history of colonic polyps  Screening for malignant neoplasm of colon  - Reports family hx of colon polyps in sister and had last colonoscopy ~3y ago at [facility] with recommendation to repeat again in 3y.  - She requests referral today to Dr. Nice which was ordered.    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  - Will check vitamin D level    Acquired hypothyroidism  - On levothyroxine 50mcg daily, will check TSH    Recurrent major depressive disorder, in partial remission (CMS/HCC) and Anxiety  - Currently on fluoxetine 40mg daily.   - Reports mood has improved from prior, has hx of SI and inpatient admission to the [facility]. She is still depressed and tearful in office.  - Recommend she continue fluoxetine 40mg daily and resume counseling, resources provided.    Insomnia due to other mental disorder  - Managed with trazodone 50-100mg qhs    Tobacco use  - 22.5 pack year smoking history  -  LDCT ordered    Alcohol use  - Currently drinking 6 pack per day x5d a week. Started this in 2017 after her sister passed away.  - Discussed risks and effects on insomnia, incontinence, mood. Recommend counseling and resources provided.    Encounter for screening mammogram for malignant neoplasm of breast  - Mammogram ordered    Screening, lipid  - Lipid panel ordered    Screening for diabetes mellitus  - A1c ordered    Healthcare maintenance  - CBC and CMP ordered    Encounter for hepatitis C screening test for low risk patient  - HCV ab ordered       Health Maintenance   Topic Date Due   • COLONOSCOPY  Never done   • ANNUAL PHYSICAL  Never done   • TDAP/TD VACCINES (1 - Tdap) Never done   • HEPATITIS C SCREENING  Never done   • PAP SMEAR  Never done   • LIPID PANEL  01/08/2019   • ZOSTER VACCINE (1 of 2) Never done   • MAMMOGRAM  09/27/2020   • INFLUENZA VACCINE  08/01/2021   • COVID-19 Vaccine  Completed   • Pneumococcal Vaccine 0-64  Aged Out     Health Maintenance  - Pap smear: at next visit  - Mammogram: ordered  - Colonoscopy: Due, reports need for cscope q3y due to fhx of polyps. Referred.  - Lung cancer screening: ordered  - HCV: ordered  - Immunizations: COVID complete.  - Depression screening: negative 4/2021  Return in about 3 months (around 7/30/2021) for Follow up with pap smear 30 minutes, Labs today.       3 = A little assistance

## 2024-01-15 ENCOUNTER — OFFICE VISIT (OUTPATIENT)
Dept: PAIN MEDICINE | Facility: CLINIC | Age: 54
End: 2024-01-15
Payer: COMMERCIAL

## 2024-01-15 ENCOUNTER — TELEPHONE (OUTPATIENT)
Dept: PAIN MEDICINE | Facility: CLINIC | Age: 54
End: 2024-01-15

## 2024-01-15 ENCOUNTER — HOSPITAL ENCOUNTER (OUTPATIENT)
Dept: GENERAL RADIOLOGY | Facility: HOSPITAL | Age: 54
Discharge: HOME | End: 2024-01-15
Admitting: ANESTHESIOLOGY
Payer: COMMERCIAL

## 2024-01-15 VITALS
WEIGHT: 168 LBS | RESPIRATION RATE: 18 BRPM | BODY MASS INDEX: 29.77 KG/M2 | DIASTOLIC BLOOD PRESSURE: 80 MMHG | SYSTOLIC BLOOD PRESSURE: 125 MMHG | HEIGHT: 63 IN | HEART RATE: 70 BPM | TEMPERATURE: 97.6 F | OXYGEN SATURATION: 99 %

## 2024-01-15 DIAGNOSIS — M25.361 INSTABILITY OF RIGHT KNEE JOINT: ICD-10-CM

## 2024-01-15 DIAGNOSIS — M17.11 OSTEOARTHRITIS OF RIGHT KNEE, UNSPECIFIED OSTEOARTHRITIS TYPE: ICD-10-CM

## 2024-01-15 DIAGNOSIS — M23.8X1 ACL LAXITY, RIGHT: ICD-10-CM

## 2024-01-15 DIAGNOSIS — R26.89 PAINFUL GAIT: ICD-10-CM

## 2024-01-15 DIAGNOSIS — S83.281A TEAR OF LATERAL MENISCUS OF RIGHT KNEE, UNSPECIFIED TEAR TYPE, UNSPECIFIED WHETHER OLD OR CURRENT TEAR, INITIAL ENCOUNTER: ICD-10-CM

## 2024-01-15 DIAGNOSIS — F99 INSOMNIA DUE TO OTHER MENTAL DISORDER: ICD-10-CM

## 2024-01-15 DIAGNOSIS — M25.561 ACUTE PAIN OF RIGHT KNEE: Primary | ICD-10-CM

## 2024-01-15 DIAGNOSIS — R52 PAINFUL GAIT: ICD-10-CM

## 2024-01-15 DIAGNOSIS — F51.05 INSOMNIA DUE TO OTHER MENTAL DISORDER: ICD-10-CM

## 2024-01-15 DIAGNOSIS — M25.561 ACUTE PAIN OF RIGHT KNEE: ICD-10-CM

## 2024-01-15 PROCEDURE — 99204 OFFICE O/P NEW MOD 45 MIN: CPT | Performed by: ANESTHESIOLOGY

## 2024-01-15 PROCEDURE — 73560 X-RAY EXAM OF KNEE 1 OR 2: CPT

## 2024-01-15 RX ORDER — METHYLPREDNISOLONE 4 MG/1
TABLET ORAL
Qty: 21 EACH | Refills: 0 | Status: SHIPPED | OUTPATIENT
Start: 2024-01-15

## 2024-01-15 RX ORDER — TIZANIDINE 2 MG/1
TABLET ORAL
Qty: 20 TABLET | Refills: 0 | Status: SHIPPED | OUTPATIENT
Start: 2024-01-15 | End: 2024-01-15

## 2024-01-15 RX ORDER — METHYLPREDNISOLONE 4 MG/1
TABLET ORAL
Qty: 21 EACH | Refills: 0 | Status: SHIPPED | OUTPATIENT
Start: 2024-01-15 | End: 2024-01-15

## 2024-01-15 RX ORDER — TIZANIDINE 2 MG/1
TABLET ORAL
Qty: 20 TABLET | Refills: 0 | Status: SHIPPED | OUTPATIENT
Start: 2024-01-15

## 2024-01-15 NOTE — PROGRESS NOTES
"Chief Complaint: \"Sudden onset of severe right knee pain. Inability to bear weight. If I try to bend my knee, it gets locked.\"      History of Present Illness:   Patient: Ms. Mona Wilkerson, 53 y.o. female   Referring Physician: Self referral  Reason for Referral: Consultation for severe onset of acute right knee pain.   Pain History: Mona Wilkerson reports a remote history of knee difficulties more than 20 years ago. At that time, she underwent orthopedic consultation and was treated with conservative measures. She was diagnosed with overuse injury/cumulative trauma secondary to long-distance running. Her pain eventually resolved with bracing, analgesics, and rest. Also, she was forced to quit running. She states that she did fine after that. About 4-5 weeks ago, she started noticing difficulties with her right knee described as \"more popping, cranking and buckling...\". She reports that perhaps she had some discomfort but nothing that would interfere with her lifestyle. 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 She is quite active. She reports that today, around 9 AM, she was standing and perhaps she turned and pivoted developing acute onset of severe right knee pain that forced her to sit down immediately. She tried several times to bear weight and the pain would become excruciating. Also, every time she tried to fully extent her right leg or flex her knee, she would experience severe right knee pain and her right knee joint \"would get locked/stuck.\" She has taken Tylenol and NSAIDs, applied ice packs, a soft knee brace, and elevated her leg without relief. Right knee X-rays AP and lateral on 01/15/2024 revealed no acute bony abnormality, fracture, or dislocation. Moderate degenerative changes. Mild joint space narrowing of the medial and lateral compartments and moderate patellofemoral compartment. There appears to be a mild joint effusion. Mona Wilkerson denies other symptoms. " "  Pain Description:  Constant right anterior knee pain with intermittent exacerbation, described as \"locking up\", sharp, and throbbing sensation.   Radiation of Pain: The pain does not radiate   Pain intensity today: 10/10   Average pain intensity last week: 0/10  Pain intensity ranges from: 3/10 to 10/10  Aggravating factors: Pain increases with movement of the knee, standing, walking.   Alleviating factors: Pain decreases with rest, elevation, ice, analgesics  Associated Symptoms:   Patient denies numbness or weakness in the lower extremities.   Patient denies any new bladder or bowel problems.   Patient reports difficulties with her balance. She did not fall when pain started  Pain interferes with ADLs and general activities   Muscle spasms: Right calf  Stiffness: Right knee  Popping, cracking, knee gets locked with movement: Right knee. Denies symptoms in opposite limb    Review of previous therapies and additional medical records:  Mona Wilkerson has already failed the following measures, including:   Conservative Measures: Tylenol, NSAIDs, ICE, knee brace (no relief)  Interventional Measures:  None  Surgical Measures: No history of previous cervical or lumbar spine surgery, hip or knee surgery   Mona Wilkerson underwent orthopedic consultation 20 years ago for knee pain due to running that resolved with bracing and rest (stopped running)  Mona Wilkerson is a healthy adult that reports a history of well controlled depression with anxiety, insomnia, GERD, hypertension, hypothyroidism, hyperlipidemia  In terms of current analgesics, Mona Wilkerson takes: No previous analgesics until current injury. Patient also takes Prozac, Seroquel (insomnia)  I have reviewed Romario Report consistent with medication reconciliation.    PHQ-2 Depression Screening  Little interest or pleasure in doing things? 0-->not at all   Feeling down, depressed, or hopeless? 0-->not at all   PHQ-2 Total Score 0     Pain " Self-Efficacy Questionnaire (PSEQ): Deferred/not indicated    Global Pain Scale: Deferred/not indicated    The [employer] Osteoarthritis Index (WOMAC) to assess pain, stiffness, and physical function in patients with knee osteoarthritis:      Review of Diagnostic Studies:  I have independently reviewed and interpreted the images and discussed findings with the patient.  I have also reviewed the report.  Right knee X-rays AP and lateral 01/15/2024: No acute bony abnormality, fracture, or dislocation. Moderate degenerative changes. Mild joint space narrowing of the medial and lateral compartments and moderate patellofemoral compartment. There appears to be mild joint effusion.      Review of Systems   Musculoskeletal:  Positive for gait problem and joint swelling.   All other systems reviewed and are negative.        Patient Active Problem List   Diagnosis    Hypothyroidism    Mixed hyperlipidemia    GERD without esophagitis    Anxiety    Vitamin D deficiency    Recurrent major depressive disorder, in partial remission    Family history of colonic polyps    Mixed urge and stress incontinence    Alcohol use    Insomnia due to other mental disorder    Iron deficiency anemia    ASCUS of cervix with negative high risk HPV    History of colonic polyps    Diverticulosis    Class 1 obesity due to excess calories with serious comorbidity and body mass index (BMI) of 30.0 to 30.9 in adult    DDD (degenerative disc disease), cervical    Primary hypertension    Attention deficit hyperactivity disorder (ADHD)    Nasal sore    DDD (degenerative disc disease), lumbar    Lumbar radiculopathy    Acute pain of right knee    Tear of lateral meniscus of right knee    ACL laxity, right    Osteoarthritis of right knee    Painful gait       Past Medical History:   Diagnosis Date    Abnormal Pap smear of cervix     ADHD     Cervical disc disorder     Colon polyp 2018    Cyst of ovary 11/21/2016    Depression      Depression with anxiety 11/04/2016    seeing [facility] [place]- doing well  off lithium  changed to lamictal getting adhd testing    GERD (gastroesophageal reflux disease)     Hyperlipidemia 11/04/2016 26 Jan 2016  reviewed lipid panel with her     Hypertension 10/07/2022    Hypothyroidism     Insomnia due to other mental disorder 04/30/2021    Low back pain     Lumbar radiculopathy 07/05/2023    Lumbosacral disc disease     Nausea and vomiting 01/14/2022    Primary insomnia 11/21/2016    Sleep difficulties     Stage 3a chronic kidney disease 05/05/2021    Tobacco use 04/30/2021         Past Surgical History:   Procedure Laterality Date    COLONOSCOPY      COLONOSCOPY W/ POLYPECTOMY  10/29/2021    Dr. Nice, diverticulosis and removal of tubular adenoma    ENDOMETRIAL ABLATION W/ NOVASURE      ENDOSCOPY  2018    ESSURE TUBAL LIGATION      EYE SURGERY  2002    Lasik    LASIK Bilateral     LIPOSUCTION      RHINOPLASTY           Family History   Problem Relation Age of Onset    Sleep disorder Mother     Thyroid disease Mother     Breast cancer Mother 53    Hypertension Mother         Essential HTN    Hyperlipidemia Mother     Myelodysplastic syndrome Mother     Cancer Mother         Breast CA, lumpectomy w/ nodes    Hypertension Father         Essential HTN    Hyperlipidemia Father     Myelodysplastic syndrome Father     Alcohol abuse Sister     Hypertension Sister         Essential HTN    Hyperlipidemia Sister     Migraines Sister         Migraine headaches    Sleep disorder Sister     Arthritis Sister         Rheumatoid/Psoriatic?    Hypertension Sister     Hypothyroidism Sister     Hyperlipidemia Sister     Thyroid disease Sister     Diabetes Paternal Aunt     Breast cancer Maternal Grandmother 74    Ovarian cancer Neg Hx     Colon cancer Neg Hx     Liver cancer Neg Hx     Rectal cancer Neg Hx     Stomach cancer Neg Hx          Social History     Socioeconomic History    Marital status:    Tobacco Use    Smoking  status: Former     Packs/day: 0.50     Years: 30.00     Additional pack years: 0.00     Total pack years: 15.00     Types: Cigarettes     Quit date: 2022     Years since quittin.7    Smokeless tobacco: Never    Tobacco comments:     last cigarette 4/15/2022   Vaping Use    Vaping Use: Never used   Substance and Sexual Activity    Alcohol use: Yes     Alcohol/week: 6.0 standard drinks of alcohol     Types: 6 Cans of beer per week     Comment: Weekly    Drug use: No    Sexual activity: Not Currently     Partners: Male     Birth control/protection: Essure     Comment: Essure           Current Outpatient Medications:     clobetasol (TEMOVATE) 0.05 % cream, Apply 1 application  topically to the appropriate area as directed 2 (Two) Times a Day., Disp: , Rfl:     estradiol (Vagifem) 10 MCG tablet vaginal tablet, Insert 1 tablet into the vagina 2 (Two) Times a Week., Disp: 24 tablet, Rfl: 3    fluocinonide (LIDEX) 0.05 % external solution, Apply  topically to the appropriate area as directed Daily., Disp: , Rfl:     FLUoxetine (PROzac) 40 MG capsule, Take 1 capsule by mouth Daily., Disp: 30 capsule, Rfl: 1    ketoconazole (NIZORAL) 2 % shampoo, , Disp: , Rfl:     levothyroxine (SYNTHROID, LEVOTHROID) 50 MCG tablet, Take 1 tablet by mouth Daily., Disp: 90 tablet, Rfl: 3    lisinopril (PRINIVIL,ZESTRIL) 40 MG tablet, Take 1 tablet by mouth Daily., Disp: 90 tablet, Rfl: 2    omeprazole (priLOSEC) 20 MG capsule, Take 1 capsule by mouth Daily., Disp: 90 capsule, Rfl: 1    QUEtiapine (SEROquel) 25 MG tablet, Take 1-2 tablet(s) by mouth at night as needed for sleep, Disp: 60 tablet, Rfl: 2    levothyroxine (SYNTHROID, LEVOTHROID) 50 MCG tablet, Take 1 tablet by mouth Daily. (Patient not taking: Reported on 1/15/2024), Disp: 90 tablet, Rfl: 3    lisinopril (PRINIVIL,ZESTRIL) 40 MG tablet, Take 1 tablet by mouth Daily. (Patient not taking: Reported on 1/15/2024), Disp: 90 tablet, Rfl: 3      Allergies   Allergen Reactions  "   Amlodipine Swelling         /80   Pulse 70   Temp 97.6 °F (36.4 °C)   Resp 18   Ht 160 cm (63\")   Wt 76.2 kg (168 lb)   SpO2 99%   BMI 29.76 kg/m²       Physical Exam:  Constitutional: Patient appears well-developed, well-nourished, well-hydrated, appears younger than stated age  HEENT: Head: Normocephalic and atraumatic  Eyes: Conjunctivae and lids are normal  Pupils: Equal, round, reactive to light  Musculoskeletal   Gait and station: Gait evaluation demonstrated an antalgic gait, extremely limited assessment due to the severity of her right knee pain. She is unable to stand for more than a few seconds, or walk.  Lumbar Spine: Passive and active range of motion are almost full and without pain. Extension, flexion, lateral flexion, rotation of the lumbar spine did not increase or reproduce pain. Lumbar facet joint loading maneuvers are negative.   Sacroiliac Joints: Sachin's test: Left negative. Right: Deferred due to the severity of right knee pain.  Gaenslen's test: Left negative. Right: Deferred due to the severity of right knee pain.    Right Hip Joint: The range of motion of the hip joint is limited to assess due to the severity of right knee pain  Left Hip Joint: The range of motion of the hip joint is almost full and without pain   Palpation of the bilateral greater trochanters: Unrevealing   Examination of the Iliotibial band: unrevealing   Right knee:  Active range of motion: -5 to 80 degrees. Passive ROM: -5 to 100, limited due to pain. Leandra test: +. No PCL, LCL or MCL laxity. There is some ACL laxity. Tenderness found in the medial > lateral compartment. No MCL and no LCL tenderness noted. Crepitus, popping, clicking, clunking and locking.  Left knee:  Range of motion: Normal . No ACL, PCL, LCL or MCL laxity. No  tenderness found. No MCL and no LCL tenderness noted. Leandra test: -. No  crepitus.   Neurological:   Patient is alert and oriented to person, place, and time.   Speech: " "Normal.   Cortical function: Normal mental status.   Reflex Scores:  Right patellar: 2+  Left patellar: 2+  Right Achilles: 2+  Left Achilles: 2+  Motor strength: 5/5  Motor Tone: Normal  Involuntary movements: None.   Superficial/Primitive Reflexes: Primitive reflexes were absent.   Right Singer: Absent  Left Singer: Absent  Right ankle clonus: Absent  Left ankle clonus: Absent   Babinsky: Absent  Straight leg raising test: Negative.   Sensory exam: Intact to light touch, intact pain and temperature sensation, intact vibration sensation and normal proprioception  Skin and subcutaneous tissue: Skin is warm and intact. No rash noted. No cyanosis.   Psychiatric: Judgment and insight: Normal. Recent and remote memory: Intact. Mood and affect: Normal.     ASSESSMENT:   1. Acute pain of right knee    2. Tear of lateral meniscus of right knee, unspecified tear type, unspecified whether old or current tear, initial encounter    3. ACL laxity, right    4. Instability of right knee joint    5. Osteoarthritis of right knee, unspecified osteoarthritis type    6. Painful gait    7. Insomnia due to other mental disorder        PLAN/MEDICAL DECISION MAKING:  Ms. Mona Wilkerson, 53 y.o. female presents with severe right knee pain. She describes a remote history of knee difficulties more than 20 years ago. At that time, she underwent orthopedic consultation and was treated with conservative measures. She was diagnosed with overuse injury/cumulative trauma disorder secondary to long-distance running. Her pain eventually resolved with bracing, analgesics, and rest. Also, she was forced to quit running. She states that she did fine after that. About 4-5 weeks ago, she started noticing intermittent difficulties with her right knee that she described as \"more popping, more clicking, more buckling...\". She also reports that she had some discomfort but nothing that would interfere with her lifestyle. 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 She is very active. She reports that earlier this morning she was standing and perhaps she turned or pivoted developing acute onset of severe right knee pain that forced her to sit down immediately. She tried several times to bear weight and the pain would become excruciating. Also, every time she tried to fully extent her right leg or flex her knee, she would experience severe right knee pain and her right knee joint \"would get locked/stuck in partial flexion.\" She has taken Tylenol and NSAIDs, applied ice packs, a soft knee brace, and elevated her leg without relief. Upon physical examination, her gait evaluation demonstrated antalgic gait/unable to bear weight on her right leg due to the severity of her pain. Examination of the right knee revealed some swelling. Active range of motion: -5 to 80 degrees with severe pain and \"catching/locking\". Passive ROM: -5 to 100, limited due to pain. Leandra test: +. No PCL, LCL or MCL laxity. There is some ACL laxity. Tenderness found in the medial > lateral compartment. No MCL and no LCL tenderness noted. Crepitus, popping, clicking, clunking and locking. Examination of the left knee: Normal findings. Focused neurological examination: Unrevealing. Right knee X-rays revealed no acute bony abnormality, fracture, or dislocation. Moderate degenerative changes. Mild joint space narrowing of the medial and lateral compartments and moderate patellofemoral compartment. There appears to be a mild joint effusion. A comprehensive evaluation including history and physical exam along with pertinent physiologic and functional assessment was performed. I had a lengthy conversation with Ms. Mona Wilkerson regarding her acute on chronic severe right knee pain condition and potential therapeutic options including risks, benefits, alternative therapies, to name a few. Mona Wilkerson is particularly concerned due to an upcoming trip to [facility]. I " "suspect that she has a meniscus tear and possible compromise of her cruciate ligaments. We discussed using a stepwise approach starting with the least intense level of care as determined by the extent required to diagnose and treat her condition. I have advised on at least maximization of conservative measures and obtaining an MRI without contrast of the right knee. Therefore, I have proposed the following plan:    1. Follow up: Patient will report to me directly as soon as we get her MRI of the right knee.  If any changes in her condition, I have advised her to come to the ER or will facilitate a referral to orthopedics     2. Interventional pain management measures: Patient does not take blood thinners. None indicated at this time. Depending on MRI findings, we may consider right knee joint injection with steroids.     3. Diagnostic studies:  MRI of the right knee without contrast: Patient describes a history of chronic right knee difficulties that began more than 20 years ago and forced her to quit running for fitness. For the past 4-5 weeks, she has noticed increasing \"popping, crackling, and buckling\" with her right knee although it did not affect patient's ability to work or limit recreational activities. She developed acute onset of severe right knee pain and swelling to the extent that she is unable to bear weight or walk, pain with minimal range of motion, feeling that her knee is popping and buckling/giving way. I explained that her symptoms and findings of her physical exam would need an MRI to diagnose the problem.    4. Pharmacological measures: Reviewed and discussed;   A. Patient does not take analgesics on regular basis. She started NSAIDs and Tylenol today. Patient also takes Prozac and Seroquel  B. I will send a prescription for a Medrol dose pack with instructions. Do not take NSAIDs concurrently  C. May take Tylenol 500 mg four times a day as needed for mild to moderate breakthrough pain  D. " Trial with Voltaren gel QID apply to right knee  E. Trial with tizanidine 2 mg 1/2 to 1 tablet BID PRN muscle spasm, #20, 0 refill    5. Long-term rehabilitation efforts:  A. The patient does not have a history of falls.   B. Physical therapy: Will defer until after obtaining MRI unless she responds to oral steroids. In that case, she will start Physical Therapy for Alter-G and/or water therapy, VMO/quadriceps/hamstrings strengthening, range of motion, joint mobilization,   ultrasound, ASTYM, E-STIM, myofascial release, cupping, dry needling, 2-3 x per week   C. Apply ice packs for 15 minutes every 2-4 hours for 72 hours. Then, use contrast therapy: Apply ice-packs for 15-20 minutes, followed by heating pads for 15-20 minutes to affected area   D. Rest and elevate right leg. Limit weight bearing: May use one or 2 crutches.   E. Patient has a knee brace at home. I advised her to wear her knee brace. She might need an offloading right knee brace for for stabilization of the medial and lateral compartments. Patient has some ligamentous instability and the brace will help support the knee and limit instability.  F. Mona Wilkerson  reports that she quit smoking about 21 months ago. Her smoking use included cigarettes. She has a 15.00 pack-year smoking history. She has never used smokeless tobacco.    5. The patient has been instructed to contact my office with any questions or difficulties. The patient understands the plan and agrees to proceed accordingly.    The patient has a documented plan of care to address chronic pain. Mona Wilkerson reports a pain score of 10/10.  Given her pain assessment as noted, treatment options were discussed and the following options were decided upon as a follow-up plan to address the patient's pain: continuation of current treatment plan for pain, educational materials on pain management, home exercises and therapy, prescription for non-opiod analgesics, referral to Physical  Therapy, steroid injections, use of non-medical modalities (ice, heat, stretching and/or behavior modifications), and obtaining an MRI of her right knee, consideration for interventional pain management measures, referral to orthopedics, etc .               Pain Management Panel          Latest Ref Rng & Units 5/10/2021   Pain Management Panel   Creatinine, Urine mg/dL 31.8         NEREIDA query complete. NEREIDA reviewed by Yimi Jaramillo MD.     Pain Medications               FLUoxetine (PROzac) 40 MG capsule Take 1 capsule by mouth Daily.    QUEtiapine (SEROquel) 25 MG tablet Take 1-2 tablet(s) by mouth at night as needed for sleep             Orders Placed This Encounter   Procedures    MRI Knee Right Without Contrast     Order Specific Question:   Reason for Exam:     Answer:   ACUTE ONSET OF SEVERE RIGHT KNEE JOINT PAIN/UNABLE TO BEAR WEIGHT/WALK. KNEE IS LOCKING WITH PARTIAL ROM. POSSIBLE MENISCUS TEAR/ACL TEAR     Order Specific Question:   Release to patient     Answer:   Routine Release [[id number]]        Time spent face-to-face with the patient, ordering and reviewing diagnostic studies, discussing plan of care and writing this note: 50 minutes    Please note that portions of this note were completed with a voice recognition program.   Any copied data in any portion of my note has been reviewed by myself and accurate.     The 21st Century Cures Act makes medical notes like this available to patients in the interest of transparency. This is a medical document intended as peer to peer communication. It is written in medical language and may contain abbreviations or verbiage that are unfamiliar. It may appear blunt or direct. Medical documents are intended to carry relevant information, facts as evident, and the clinical opinion of the practitioner.     Yimi Jaramillo MD    Patient Care Team:  Muna Huitron MD as PCP - General (Internal Medicine)  [facility], MARIA L Toro as Nurse Practitioner  (Obstetrics and Gynecology)     No orders of the defined types were placed in this encounter.        Future Appointments   Date Time Provider Department Center   1/23/2024  3:45 PM Agnes Strickland APRN [facility] BMT None   1/25/2024  8:00 AM Donato Martinez LCSW [facility] BMT None   5/16/2024  3:45 PM Muna Huitron MD [facility]

## 2024-01-15 NOTE — TELEPHONE ENCOUNTER
Provider:  DR. STORMY ALBRECHT    Caller: REGGIE BRASHER    Relationship to Patient: SELF    Pharmacy: [facility] [phone]    Phone Number:  [phone]    Reason for Call:  PATIENT WAS SEEN IN OFFICE TODAY AND SAYS IS SUPPOSED TO HAVE 2 PRESCRIPTIONS CALLED IN. PATIENT WOULD LIKE THEM CALLED TO [facility] ABOVE.

## 2024-01-15 NOTE — TELEPHONE ENCOUNTER
Spoke with pt and advised that I sent her medication over to [facility] for her. Pt inquired about her MRI and I advised that I will be calling [facility] in the morning to see about scheduling. No further needs expressed.

## 2024-01-16 ENCOUNTER — HOSPITAL ENCOUNTER (OUTPATIENT)
Dept: MRI IMAGING | Facility: HOSPITAL | Age: 54
Discharge: HOME | End: 2024-01-16
Admitting: ANESTHESIOLOGY
Payer: COMMERCIAL

## 2024-01-16 DIAGNOSIS — M25.361 INSTABILITY OF RIGHT KNEE JOINT: ICD-10-CM

## 2024-01-16 DIAGNOSIS — M25.561 ACUTE PAIN OF RIGHT KNEE: Primary | ICD-10-CM

## 2024-01-16 DIAGNOSIS — S83.281A TEAR OF LATERAL MENISCUS OF RIGHT KNEE, UNSPECIFIED TEAR TYPE, UNSPECIFIED WHETHER OLD OR CURRENT TEAR, INITIAL ENCOUNTER: ICD-10-CM

## 2024-01-16 DIAGNOSIS — M17.11 OSTEOARTHRITIS OF RIGHT KNEE, UNSPECIFIED OSTEOARTHRITIS TYPE: ICD-10-CM

## 2024-01-16 PROCEDURE — 73721 MRI JNT OF LWR EXTRE W/O DYE: CPT

## 2024-01-16 RX ORDER — OMEPRAZOLE 40 MG/1
40 CAPSULE, DELAYED RELEASE ORAL DAILY
Qty: 90 CAPSULE | Refills: 3 | Status: SHIPPED | OUTPATIENT
Start: 2024-01-16

## 2024-01-16 NOTE — TELEPHONE ENCOUNTER
Dr Nice wants her to increase her Omeprazole to 40mg.  She is needing a new RX sent to [facility] pharmacy for 90days.

## 2024-01-18 ENCOUNTER — PATIENT ROUNDING (BHMG ONLY) (OUTPATIENT)
Dept: PAIN MEDICINE | Facility: CLINIC | Age: 54
End: 2024-01-18
Payer: COMMERCIAL

## 2024-01-18 NOTE — PROGRESS NOTES
A MY-CHART MESSAGE HAS BEEN SENT TO THE PATIENT FOR PATIENT ROUNDING WITH [facility].

## 2024-01-23 ENCOUNTER — OFFICE VISIT (OUTPATIENT)
Dept: BEHAVIORAL HEALTH | Facility: CLINIC | Age: 54
End: 2024-01-23
Payer: COMMERCIAL

## 2024-01-23 VITALS
HEIGHT: 63 IN | WEIGHT: 169 LBS | SYSTOLIC BLOOD PRESSURE: 126 MMHG | DIASTOLIC BLOOD PRESSURE: 84 MMHG | OXYGEN SATURATION: 98 % | HEART RATE: 94 BPM | BODY MASS INDEX: 29.95 KG/M2

## 2024-01-23 DIAGNOSIS — F99 INSOMNIA DUE TO OTHER MENTAL DISORDER: ICD-10-CM

## 2024-01-23 DIAGNOSIS — F51.05 INSOMNIA DUE TO OTHER MENTAL DISORDER: ICD-10-CM

## 2024-01-23 DIAGNOSIS — F33.41 RECURRENT MAJOR DEPRESSIVE DISORDER, IN PARTIAL REMISSION: ICD-10-CM

## 2024-01-23 DIAGNOSIS — F41.9 ANXIETY: ICD-10-CM

## 2024-01-23 PROCEDURE — 99214 OFFICE O/P EST MOD 30 MIN: CPT | Performed by: REGISTERED NURSE

## 2024-01-23 RX ORDER — FLUOXETINE HYDROCHLORIDE 40 MG/1
40 CAPSULE ORAL DAILY
Qty: 90 CAPSULE | Refills: 0 | Status: SHIPPED | OUTPATIENT
Start: 2024-01-23

## 2024-01-23 RX ORDER — TRAZODONE HYDROCHLORIDE 50 MG/1
50 TABLET ORAL NIGHTLY
Qty: 90 TABLET | Refills: 0 | Status: SHIPPED | OUTPATIENT
Start: 2024-01-23

## 2024-01-23 NOTE — PROGRESS NOTES
Follow Up Office Visit      Patient Name: Mona Wilkerson  : 1970   MRN: [id number]     Referring Provider: Muna Huitron MD    Chief Complaint:      ICD-10-CM ICD-9-CM   1. Recurrent major depressive disorder, in partial remission  F33.41 296.35   2. Anxiety  F41.9 300.00   3. Insomnia due to other mental disorder  F51.05 300.9    F99 327.02        History of Present Illness:   Mona Wilkerson is a 53 y.o. female who is here today for follow up and medication management    Subjective      Patient Reports: she has seen an improvement with the increase in her prozac. She reports that she her mood has improved and her anxiety has decreased. She reports that she is more excited about her upcoming vacation. She reports that she has been drinking about the same but she as tried to cut back. She reports that she still struggles with sleep. She reports that she started therapy and has another session tomorrow. Denies side effects. Denies SI/HI/AVH.    Review of Systems:   Review of Systems   Constitutional:  Negative for appetite change and unexpected weight change.   Eyes:  Negative for visual disturbance.   Respiratory:  Negative for chest tightness and shortness of breath.    Cardiovascular:  Negative for chest pain.   Musculoskeletal:  Negative for gait problem.   Skin:  Negative for rash and wound.   Neurological:  Negative for dizziness, tremors, seizures, weakness and light-headedness.   Psychiatric/Behavioral:  Positive for sleep disturbance. Negative for agitation, behavioral problems, confusion, decreased concentration, hallucinations, self-injury and suicidal ideas. The patient is not hyperactive.      Sleep pattern: still with sleep issues  Appetite: no changes     Screening Scores:   PHQ-9 : 4  CARLEY-7 : 0    RISK ASSESSMENT:  Patient denies any thoughts or intent of suicide today. Patient denies any impulsive behavior today.     Medications:     Current Outpatient Medications:      "clobetasol (TEMOVATE) 0.05 % cream, Apply 1 application  topically to the appropriate area as directed 2 (Two) Times a Day., Disp: , Rfl:     Diclofenac Sodium (VOLTAREN) 1 % gel gel, Apply 4 g topically to the appropriate area as directed 4 (Four) Times a Day As Needed (right knee pain)., Disp: 100 g, Rfl: 3    estradiol (Vagifem) 10 MCG tablet vaginal tablet, Insert 1 tablet into the vagina 2 (Two) Times a Week., Disp: 24 tablet, Rfl: 3    fluocinonide (LIDEX) 0.05 % external solution, Apply  topically to the appropriate area as directed Daily., Disp: , Rfl:     FLUoxetine (PROzac) 40 MG capsule, Take 1 capsule by mouth Daily., Disp: 90 capsule, Rfl: 0    ketoconazole (NIZORAL) 2 % shampoo, , Disp: , Rfl:     levothyroxine (SYNTHROID, LEVOTHROID) 50 MCG tablet, Take 1 tablet by mouth Daily., Disp: 90 tablet, Rfl: 3    lisinopril (PRINIVIL,ZESTRIL) 40 MG tablet, Take 1 tablet by mouth Daily., Disp: 90 tablet, Rfl: 2    methylPREDNISolone (MEDROL) 4 MG dose pack, Take as directed on package instructions., Disp: 21 each, Rfl: 0    omeprazole (priLOSEC) 40 MG capsule, Take 1 capsule by mouth Daily., Disp: 90 capsule, Rfl: 3    QUEtiapine (SEROquel) 25 MG tablet, Take 1-2 tablet(s) by mouth at night as needed for sleep, Disp: 60 tablet, Rfl: 2    tiZANidine (ZANAFLEX) 2 MG tablet, 1/2 to 1 tablet PO BID PRN muscle spasms/do not take at the same time with other muscle relaxer, Disp: 20 tablet, Rfl: 0    traZODone (DESYREL) 50 MG tablet, Take 1 tablet by mouth Every Night., Disp: 90 tablet, Rfl: 0    Medication Considerations:  NEREIDA reviewed and appropriate.      Allergies:   Allergies   Allergen Reactions    Amlodipine Swelling         Objective     Physical Exam:  Vital Signs:   Vitals:    01/23/24 1546 01/23/24 1549   BP:  126/84   Pulse:  94   SpO2:  98%   Weight: 76.7 kg (169 lb)    Height: 160 cm (63\")      Body mass index is 29.94 kg/m².     Mental Status Exam:   Hygiene:   good  Cooperation:  Cooperative  Eye " Contact:  Good  Psychomotor Behavior:  Appropriate  Affect:  Appropriate  Mood: normal  Speech:  Normal  Thought Process:  Goal directed and Linear  Thought Content:  Normal  Suicidal:  None  Homicidal:  None  Hallucinations:  None  Delusion:  None  Memory:  Intact  Orientation:  Person, Place, Time, and Situation  Reliability:  good  Insight:  Fair  Judgement:  Fair  Impulse Control:  Fair  Physical/Medical Issues:   see problem list      Assessment / Plan      Visit Diagnosis/Orders Placed This Visit:  Diagnoses and all orders for this visit:    1. Recurrent major depressive disorder, in partial remission  -     FLUoxetine (PROzac) 40 MG capsule; Take 1 capsule by mouth Daily.  Dispense: 90 capsule; Refill: 0    2. Anxiety  -     FLUoxetine (PROzac) 40 MG capsule; Take 1 capsule by mouth Daily.  Dispense: 90 capsule; Refill: 0    3. Insomnia due to other mental disorder  -     traZODone (DESYREL) 50 MG tablet; Take 1 tablet by mouth Every Night.  Dispense: 90 tablet; Refill: 0  -     FLUoxetine (PROzac) 40 MG capsule; Take 1 capsule by mouth Daily.  Dispense: 90 capsule; Refill: 0         Functional Status: No impairment    Prognosis: Good with Ongoing Treatment     Impression/Formulation:  Patient appeared alert and oriented.  Patient is voluntarily requesting to continue outpatient psychiatric treatment at [facility].  Patient is receptive to assistance with maintaining a stable lifestyle.  Patient presents with history of     ICD-10-CM ICD-9-CM   1. Recurrent major depressive disorder, in partial remission  F33.41 296.35   2. Anxiety  F41.9 300.00   3. Insomnia due to other mental disorder  F51.05 300.9    F99 327.02     Reviewed patient's previous provider notes. Patient meets DSM V diagnostic criteria for diagnoses. Diagnoses may be updated as more information becomes available.       Treatment Plan:   Continue prozac 40 mg daily  Discontinue seroquel for sleep  Begin trazodone 50 mg  for sleep  Continue individual therapy  Advised against drinking with sleep aids  Follow up in 3 months or sooner if needed  Patient will continue supportive psychotherapy efforts and medications as indicated. Clinic will obtain release of information for current treatment team for continuity of care as needed. Patient will contact this office, call 911 or present to the nearest emergency room should suicidal or homicidal ideations occur.  Discussed medication options and treatment plan of prescribed medication(s) as well as the risks, benefits, and potential side effects. Patient ackowledged and verbally consented to continue with current treatment plan and was educated on the importance of compliance with treatment and follow-up appointments.     Patient instructions:  Medication risks and side effects discussed with patient including risk for worsening mood, changes in behavior, thoughts of suicide or homicide, induction of sal, serotonin syndrome.   If any thoughts of SI or HI, worsening mood or changes in behavior, call 911 or crisis line [phone], or go to nearest ER at once. Pt.verbalizes understanding and consents to treatment with this medication.     Follow Up:   Return in about 3 months (around 4/23/2024) for Med Check.        MARIA L Silverio, PMHNP-BC [facility]

## 2024-01-24 ENCOUNTER — PATIENT MESSAGE (OUTPATIENT)
Dept: PAIN MEDICINE | Facility: CLINIC | Age: 54
End: 2024-01-24
Payer: COMMERCIAL

## 2024-01-24 PROBLEM — M25.361 INSTABILITY OF RIGHT KNEE JOINT: Status: ACTIVE | Noted: 2024-01-24

## 2024-01-27 DIAGNOSIS — M25.561 ACUTE PAIN OF RIGHT KNEE: ICD-10-CM

## 2024-01-27 RX ORDER — HYDROCODONE BITARTRATE AND ACETAMINOPHEN 5; 325 MG/1; MG/1
TABLET ORAL
Qty: 15 TABLET | Refills: 0 | Status: SHIPPED | OUTPATIENT
Start: 2024-01-27

## 2024-01-27 RX ORDER — MELOXICAM 7.5 MG/1
TABLET ORAL
Qty: 20 TABLET | Refills: 0 | Status: SHIPPED | OUTPATIENT
Start: 2024-01-27

## 2024-01-27 RX ORDER — METHYLPREDNISOLONE 4 MG/1
TABLET ORAL
Qty: 21 EACH | Refills: 0 | Status: SHIPPED | OUTPATIENT
Start: 2024-01-27

## 2024-01-27 RX ORDER — TIZANIDINE 2 MG/1
TABLET ORAL
Qty: 20 TABLET | Refills: 0 | Status: SHIPPED | OUTPATIENT
Start: 2024-01-27

## 2024-01-29 RX ORDER — FLUOCINONIDE TOPICAL SOLUTION USP, 0.05% 0.5 MG/ML
SOLUTION TOPICAL DAILY
OUTPATIENT
Start: 2024-01-29

## 2024-01-29 RX ORDER — KETOCONAZOLE 20 MG/ML
SHAMPOO TOPICAL
OUTPATIENT
Start: 2024-01-29

## 2024-01-29 RX ORDER — LEVOTHYROXINE SODIUM 0.05 MG/1
50 TABLET ORAL DAILY
Qty: 90 TABLET | Refills: 3 | OUTPATIENT
Start: 2024-01-29

## 2024-02-19 ENCOUNTER — TELEPHONE (OUTPATIENT)
Dept: PAIN MEDICINE | Facility: CLINIC | Age: 54
End: 2024-02-19
Payer: COMMERCIAL

## 2024-02-19 DIAGNOSIS — M25.561 ACUTE PAIN OF RIGHT KNEE: ICD-10-CM

## 2024-02-19 RX ORDER — LEVOTHYROXINE SODIUM 0.05 MG/1
50 TABLET ORAL DAILY
Qty: 90 TABLET | Refills: 3 | OUTPATIENT
Start: 2024-02-19

## 2024-02-20 RX ORDER — MELOXICAM 7.5 MG/1
TABLET ORAL
Qty: 20 TABLET | Refills: 0 | OUTPATIENT
Start: 2024-02-20

## 2024-02-20 RX ORDER — HYDROCODONE BITARTRATE AND ACETAMINOPHEN 5; 325 MG/1; MG/1
TABLET ORAL
Qty: 15 TABLET | Refills: 0 | OUTPATIENT
Start: 2024-02-20

## 2024-02-20 RX ORDER — TIZANIDINE 2 MG/1
TABLET ORAL
Qty: 20 TABLET | Refills: 0 | OUTPATIENT
Start: 2024-02-20

## 2024-02-20 NOTE — TELEPHONE ENCOUNTER
Provider: DR ALBRECHT     Caller: PATIENT     Phone Number: [phone]    Reason for Call: PATIENT STATES THAT MEDIATION REFILLS CAN BE CANCELLED. THEY WERE REQUESTED IN ERROR. PLEASE CALL WITH ANY QUESTIONS AND IT IS OKAY TO LEAVE A VOICEMAIL.

## 2024-03-05 ENCOUNTER — LAB (OUTPATIENT)
Dept: LAB | Facility: HOSPITAL | Age: 54
End: 2024-03-05
Payer: COMMERCIAL

## 2024-03-05 ENCOUNTER — OFFICE VISIT (OUTPATIENT)
Dept: FAMILY MEDICINE CLINIC | Facility: CLINIC | Age: 54
End: 2024-03-05
Payer: COMMERCIAL

## 2024-03-05 VITALS
SYSTOLIC BLOOD PRESSURE: 120 MMHG | HEIGHT: 63 IN | BODY MASS INDEX: 28.53 KG/M2 | HEART RATE: 70 BPM | DIASTOLIC BLOOD PRESSURE: 78 MMHG | OXYGEN SATURATION: 99 % | WEIGHT: 161 LBS

## 2024-03-05 DIAGNOSIS — Z78.0 MENOPAUSE: ICD-10-CM

## 2024-03-05 DIAGNOSIS — E78.2 MIXED HYPERLIPIDEMIA: ICD-10-CM

## 2024-03-05 DIAGNOSIS — K21.9 GERD WITHOUT ESOPHAGITIS: Chronic | ICD-10-CM

## 2024-03-05 DIAGNOSIS — Z87.891 PERSONAL HISTORY OF NICOTINE DEPENDENCE: ICD-10-CM

## 2024-03-05 DIAGNOSIS — I10 PRIMARY HYPERTENSION: ICD-10-CM

## 2024-03-05 DIAGNOSIS — I10 PRIMARY HYPERTENSION: Primary | ICD-10-CM

## 2024-03-05 DIAGNOSIS — F10.21 ALCOHOLISM IN RECOVERY: ICD-10-CM

## 2024-03-05 DIAGNOSIS — Z12.2 SCREENING FOR LUNG CANCER: ICD-10-CM

## 2024-03-05 DIAGNOSIS — F99 INSOMNIA DUE TO OTHER MENTAL DISORDER: ICD-10-CM

## 2024-03-05 DIAGNOSIS — N95.2 VAGINAL ATROPHY: ICD-10-CM

## 2024-03-05 DIAGNOSIS — Z12.31 ENCOUNTER FOR SCREENING MAMMOGRAM FOR MALIGNANT NEOPLASM OF BREAST: ICD-10-CM

## 2024-03-05 DIAGNOSIS — E03.9 ACQUIRED HYPOTHYROIDISM: Chronic | ICD-10-CM

## 2024-03-05 DIAGNOSIS — Z00.00 ENCOUNTER FOR MEDICAL EXAMINATION TO ESTABLISH CARE: ICD-10-CM

## 2024-03-05 DIAGNOSIS — E55.9 VITAMIN D DEFICIENCY: ICD-10-CM

## 2024-03-05 DIAGNOSIS — F51.05 INSOMNIA DUE TO OTHER MENTAL DISORDER: ICD-10-CM

## 2024-03-05 DIAGNOSIS — F33.41 RECURRENT MAJOR DEPRESSIVE DISORDER, IN PARTIAL REMISSION: ICD-10-CM

## 2024-03-05 LAB
ALBUMIN SERPL-MCNC: 4.7 G/DL (ref 3.5–5.2)
ALBUMIN/GLOB SERPL: 1.7 G/DL
ALP SERPL-CCNC: 107 U/L (ref 39–117)
ALT SERPL W P-5'-P-CCNC: 36 U/L (ref 1–33)
ANION GAP SERPL CALCULATED.3IONS-SCNC: 13.3 MMOL/L (ref 5–15)
AST SERPL-CCNC: 31 U/L (ref 1–32)
BILIRUB SERPL-MCNC: 0.2 MG/DL (ref 0–1.2)
BUN SERPL-MCNC: 10 MG/DL (ref 6–20)
BUN/CREAT SERPL: 10.1 (ref 7–25)
CALCIUM SPEC-SCNC: 10 MG/DL (ref 8.6–10.5)
CHLORIDE SERPL-SCNC: 100 MMOL/L (ref 98–107)
CHOLEST SERPL-MCNC: 259 MG/DL (ref 0–200)
CO2 SERPL-SCNC: 25.7 MMOL/L (ref 22–29)
CREAT SERPL-MCNC: 0.99 MG/DL (ref 0.57–1)
DEPRECATED RDW RBC AUTO: 40.9 FL (ref 37–54)
EGFRCR SERPLBLD CKD-EPI 2021: 67.5 ML/MIN/1.73
ERYTHROCYTE [DISTWIDTH] IN BLOOD BY AUTOMATED COUNT: 12 % (ref 12.3–15.4)
FSH SERPL-ACNC: 145 MIU/ML
GLOBULIN UR ELPH-MCNC: 2.8 GM/DL
GLUCOSE SERPL-MCNC: 74 MG/DL (ref 65–99)
HBA1C MFR BLD: 5.5 % (ref 4.8–5.6)
HCT VFR BLD AUTO: 42.4 % (ref 34–46.6)
HDLC SERPL-MCNC: 61 MG/DL (ref 40–60)
HGB BLD-MCNC: 14.4 G/DL (ref 12–15.9)
LDLC SERPL CALC-MCNC: 173 MG/DL (ref 0–100)
LDLC/HDLC SERPL: 2.79 {RATIO}
LH SERPL-ACNC: 77.5 MIU/ML
MCH RBC QN AUTO: 31.9 PG (ref 26.6–33)
MCHC RBC AUTO-ENTMCNC: 34 G/DL (ref 31.5–35.7)
MCV RBC AUTO: 93.8 FL (ref 79–97)
PLATELET # BLD AUTO: 299 10*3/MM3 (ref 140–450)
PMV BLD AUTO: 10.5 FL (ref 6–12)
POTASSIUM SERPL-SCNC: 4.9 MMOL/L (ref 3.5–5.2)
PROT SERPL-MCNC: 7.5 G/DL (ref 6–8.5)
RBC # BLD AUTO: 4.52 10*6/MM3 (ref 3.77–5.28)
SODIUM SERPL-SCNC: 139 MMOL/L (ref 136–145)
TRIGL SERPL-MCNC: 138 MG/DL (ref 0–150)
TSH SERPL DL<=0.05 MIU/L-ACNC: 0.82 UIU/ML (ref 0.27–4.2)
VLDLC SERPL-MCNC: 25 MG/DL (ref 5–40)
WBC NRBC COR # BLD AUTO: 4.82 10*3/MM3 (ref 3.4–10.8)

## 2024-03-05 PROCEDURE — 83036 HEMOGLOBIN GLYCOSYLATED A1C: CPT

## 2024-03-05 PROCEDURE — 82306 VITAMIN D 25 HYDROXY: CPT

## 2024-03-05 PROCEDURE — 80050 GENERAL HEALTH PANEL: CPT

## 2024-03-05 PROCEDURE — 83002 ASSAY OF GONADOTROPIN (LH): CPT

## 2024-03-05 PROCEDURE — 83001 ASSAY OF GONADOTROPIN (FSH): CPT

## 2024-03-05 PROCEDURE — 80061 LIPID PANEL: CPT

## 2024-03-05 RX ORDER — LISINOPRIL 40 MG/1
40 TABLET ORAL DAILY
Qty: 90 TABLET | Refills: 3 | Status: SHIPPED | OUTPATIENT
Start: 2024-03-05

## 2024-03-05 RX ORDER — LEVOTHYROXINE SODIUM 0.05 MG/1
50 TABLET ORAL DAILY
Qty: 90 TABLET | Refills: 3 | Status: SHIPPED | OUTPATIENT
Start: 2024-03-05

## 2024-03-05 NOTE — PROGRESS NOTES
New Patient Office Visit      Patient Name: Mona Wilkerson  : 1970   MRN: [id number]   Care Team: Patient Care Team:  Deepti Glez DO as PCP - General (Internal Medicine)  Richard, MARIA L Toro as Nurse Practitioner (Obstetrics and Gynecology)    Chief Complaint:    Chief Complaint   Patient presents with    new patient preventative medicine service    Menopause     Wants to test for it     Ear Problem     Low ear lobes        History of Present Illness: Mona Wilkerson is a 54 y.o. female who is here today to establish care.  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 Overall feeling well today. Previously following with Dr. Huitron for PCP needs.     Depression, anxiety, ADHD - following with behavioral health. Managed with prozac 40mg daily.     Heavy alcohol use - started drinking heavily around 5-6 years ago. Has not had drink since 23. Previously drinking 6-8 drinks per day. Optimistic and hoping to remain sober. Denies history of cravings. Has family history of alcoholism and she has voluntarily completed rehabilitation in 2019.    Insomnia - following with behavioral health. Has previously required trazodone/Ambien but since cutting out caffeine and alcohol, she is able to sleep without medication.     HTN - controlled with lisinopril 40mg daily    Hypothyroid - stable with synthroid 50mcg daily, no dose changes since     GERD - compliant with PPI    Follows with dermatology for itchy scalp and contact dermatitis. Using clobetasol cream for contact dermatitis prn. Using fluocinonide and nizoral topically prn for itchy scalp.     Vaginal atrophy - well controlled with estradiol tablet twice weekly     Former smoker history >20 years, quit in 2023.     Has not had menstrual cycle since  due to ablation. Concerned she may be having hot flashes related to menopause and wants hormones screened.       Subjective      Review of Systems:   Review of Systems -  See HPI    Past Medical History:   Past Medical History:   Diagnosis Date    Abnormal Pap smear of cervix     ADHD     Alcoholism in recovery 3/5/2024    Cervical disc disorder     Colon polyp 2018    Cyst of ovary 11/21/2016    Depression     Depression with anxiety 11/04/2016    seeing [facility]- doing well  off lithium  changed to lamictal getting adhd testing    GERD (gastroesophageal reflux disease)     Hyperlipidemia 11/04/2016 26 Jan 2016  reviewed lipid panel with her     Hypertension 10/07/2022    Hypothyroidism     Insomnia due to other mental disorder 04/30/2021    Low back pain     Lumbar radiculopathy 07/05/2023    Lumbosacral disc disease     Nausea and vomiting 01/14/2022    Primary insomnia 11/21/2016    Sleep difficulties     Stage 3a chronic kidney disease 05/05/2021    Tobacco use 04/30/2021       Past Surgical History:   Past Surgical History:   Procedure Laterality Date    COLONOSCOPY      COLONOSCOPY W/ POLYPECTOMY  10/29/2021    Dr. Nice, diverticulosis and removal of tubular adenoma    ENDOMETRIAL ABLATION W/ NOVASURE      ENDOSCOPY  2018    ESSURE TUBAL LIGATION      EYE SURGERY  2002    Lasik    LASIK Bilateral     LIPOSUCTION      RHINOPLASTY         Family History:   Family History   Problem Relation Age of Onset    Sleep disorder Mother     Thyroid disease Mother     Breast cancer Mother 53    Hypertension Mother         Essential HTN    Hyperlipidemia Mother     Myelodysplastic syndrome Mother     Cancer Mother         Breast CA, lumpectomy w/ nodes    Hypertension Father         Essential HTN    Hyperlipidemia Father     Myelodysplastic syndrome Father     Alcohol abuse Sister     Hypertension Sister         Essential HTN    Hyperlipidemia Sister     Migraines Sister         Migraine headaches    Sleep disorder Sister     Arthritis Sister         Rheumatoid/Psoriatic?    Hypertension Sister     Hypothyroidism Sister     Hyperlipidemia Sister     Thyroid disease Sister     Diabetes  Paternal Aunt     Breast cancer Maternal Grandmother 74    Ovarian cancer Neg Hx     Colon cancer Neg Hx     Liver cancer Neg Hx     Rectal cancer Neg Hx     Stomach cancer Neg Hx        Social History:   Social History     Socioeconomic History    Marital status:    Tobacco Use    Smoking status: Former     Current packs/day: 0.00     Average packs/day: 0.5 packs/day for 30.0 years (15.0 ttl pk-yrs)     Types: Cigarettes     Start date: 1992     Quit date: 2022     Years since quittin.8    Smokeless tobacco: Never    Tobacco comments:     last cigarette 4/15/2022   Vaping Use    Vaping status: Never Used   Substance and Sexual Activity    Alcohol use: Yes     Alcohol/week: 6.0 standard drinks of alcohol     Types: 6 Cans of beer per week     Comment: Weekly    Drug use: No    Sexual activity: Not Currently     Partners: Male     Birth control/protection: Essure     Comment: Essure       Tobacco History:   Social History     Tobacco Use   Smoking Status Former    Current packs/day: 0.00    Average packs/day: 0.5 packs/day for 30.0 years (15.0 ttl pk-yrs)    Types: Cigarettes    Start date: 1992    Quit date: 2022    Years since quittin.8   Smokeless Tobacco Never   Tobacco Comments    last cigarette 4/15/2022       Medications:     Current Outpatient Medications:     clobetasol (TEMOVATE) 0.05 % cream, Apply 1 Application topically to the appropriate area as directed 2 (Two) Times a Day., Disp: , Rfl:     estradiol (Vagifem) 10 MCG tablet vaginal tablet, Insert 1 tablet into the vagina 2 (Two) Times a Week., Disp: 24 tablet, Rfl: 3    fluocinonide (LIDEX) 0.05 % external solution, Apply  topically to the appropriate area as directed Daily., Disp: , Rfl:     FLUoxetine (PROzac) 40 MG capsule, Take 1 capsule by mouth Daily., Disp: 90 capsule, Rfl: 0    ketoconazole (NIZORAL) 2 % shampoo, , Disp: , Rfl:     levothyroxine (SYNTHROID, LEVOTHROID) 50 MCG tablet, Take 1 tablet by mouth  "Daily., Disp: 90 tablet, Rfl: 3    lisinopril (PRINIVIL,ZESTRIL) 40 MG tablet, Take 1 tablet by mouth Daily., Disp: 90 tablet, Rfl: 3    omeprazole (priLOSEC) 40 MG capsule, Take 1 capsule by mouth Daily., Disp: 90 capsule, Rfl: 3    traZODone (DESYREL) 50 MG tablet, Take 1 tablet by mouth Every Night., Disp: 90 tablet, Rfl: 0    Allergies:   Allergies   Allergen Reactions    Amlodipine Swelling       Objective     Physical Exam:  Vital Signs:   Vitals:    03/05/24 1242   BP: 120/78   Pulse: 70   SpO2: 99%   Weight: 73 kg (161 lb)   Height: 160 cm (63\")     Body mass index is 28.52 kg/m².     Physical Exam  Vitals reviewed.   Constitutional:       Appearance: Normal appearance. She is not ill-appearing.   Cardiovascular:      Rate and Rhythm: Normal rate and regular rhythm.      Heart sounds: Normal heart sounds.   Pulmonary:      Effort: Pulmonary effort is normal. No respiratory distress.      Breath sounds: Normal breath sounds. No wheezing.   Abdominal:      General: Abdomen is flat. There is no distension.      Palpations: Abdomen is soft.      Tenderness: There is no abdominal tenderness. There is no guarding or rebound.   Skin:     General: Skin is warm and dry.   Neurological:      Mental Status: She is alert.   Psychiatric:         Mood and Affect: Mood normal.         Behavior: Behavior normal.         Judgment: Judgment normal.         Assessment / Plan      Assessment/Plan:   Problems Addressed This Visit  Diagnoses and all orders for this visit:    1. Primary hypertension (Primary)  -     CBC (No Diff); Future  -     Comprehensive Metabolic Panel; Future  -     TSH; Future  -     lisinopril (PRINIVIL,ZESTRIL) 40 MG tablet; Take 1 tablet by mouth Daily.  Dispense: 90 tablet; Refill: 3  Well controlled  Continue lisinopril    2. Encounter for medical examination to establish care  -     CBC (No Diff); Future  -     Lipid Panel; Future  -     Hemoglobin A1c; Future  -     Comprehensive Metabolic Panel; " Future  -     TSH; Future  -     Mammo Screening Digital Tomosynthesis Bilateral With CAD; Future  Discussed importance of preventative care including vaccinations, age appropriate cancer screening, routine lab work, healthy diet, and active lifestyle.    - Pap smear: 2022 neg cytology, neg HPV. repeat in   - Mammogram: 2023 BIRADS 1. repeat annually, ordered today  - Colonoscopy: 2021, repeat 5y  - Lung cancer screenin2023, repeat annually, ordered today    Routine labs today  Counseled on importance of maintaining sobriety   Quit smoking     3. Mixed hyperlipidemia  -     Lipid Panel; Future    4. Acquired hypothyroidism  -     TSH; Future  -     levothyroxine (SYNTHROID, LEVOTHROID) 50 MCG tablet; Take 1 tablet by mouth Daily.  Dispense: 90 tablet; Refill: 3  TSH today,  Continue synthroid and adjust dose if needed    5. GERD without esophagitis  -     Comprehensive Metabolic Panel; Future  Well controlled with PPI    6. Recurrent major depressive disorder, in partial remission  Following with behavioral health - happy with Prozac    7. Insomnia due to other mental disorder  Improved with lifestyle/dietary modifications     8. Alcoholism in recovery  Comments:  Congratulated on recent sobriety  She is worried about her liver health - wants evaluation for hepatic steatosis/cirrhotic changes. US liver and CMP ordered  Orders:  -     Hemoglobin A1c; Future  -     TSH; Future  -     US Liver; Future    9. Personal history of nicotine dependence  -      CT Chest Low Dose Cancer Screening WO; Future    10. Screening for lung cancer  -      CT Chest Low Dose Cancer Screening WO; Future    11. Menopause  -     FSH & LH; Future    12. Encounter for screening mammogram for malignant neoplasm of breast  -     Mammo Screening Digital Tomosynthesis Bilateral With CAD; Future    13. Vitamin D deficiency  -     Vitamin D 25 hydroxy; Future    Plan of care reviewed with patient at the conclusion of today's  visit. Education was provided regarding diagnosis and management.  Patient verbalizes understanding of and agreement with management plan.    Follow Up: annually or sooner if needed pending lab results   No follow-ups on file.          DO SUNITA Holland RD  [facility]  [address]  [facility] [id number]  Fax [phone]  Phone [phone]

## 2024-03-06 LAB — 25(OH)D3 SERPL-MCNC: 68.5 NG/ML (ref 30–100)

## 2024-03-07 ENCOUNTER — PATIENT ROUNDING (BHMG ONLY) (OUTPATIENT)
Dept: FAMILY MEDICINE CLINIC | Facility: CLINIC | Age: 54
End: 2024-03-07
Payer: COMMERCIAL

## 2024-04-03 ENCOUNTER — TELEPHONE (OUTPATIENT)
Dept: INTERNAL MEDICINE | Facility: CLINIC | Age: 54
End: 2024-04-03
Payer: COMMERCIAL

## 2024-04-03 NOTE — TELEPHONE ENCOUNTER
LVM for patient to return call regarding appointment on 04/23. Unfortunately Agnes will be out of the office the afternoon of 04/23 so her appointment at 3:45 will need to be rescheduled, office number given to return call.

## 2024-04-09 ENCOUNTER — TELEPHONE (OUTPATIENT)
Dept: GASTROENTEROLOGY | Facility: CLINIC | Age: 54
End: 2024-04-09
Payer: COMMERCIAL

## 2024-04-09 RX ORDER — OMEPRAZOLE 20 MG/1
20 CAPSULE, DELAYED RELEASE ORAL DAILY
Qty: 90 CAPSULE | Refills: 3 | Status: SHIPPED | OUTPATIENT
Start: 2024-04-09

## 2024-04-09 NOTE — TELEPHONE ENCOUNTER
Patient has a prescription for Omeprazole 40mg but is currently taking only 20mg and would like to have a new rx for the lower dose.

## 2024-04-16 ENCOUNTER — TELEPHONE (OUTPATIENT)
Dept: PAIN MEDICINE | Facility: CLINIC | Age: 54
End: 2024-04-16
Payer: COMMERCIAL

## 2024-04-16 DIAGNOSIS — M17.11 OSTEOARTHRITIS OF RIGHT KNEE, UNSPECIFIED OSTEOARTHRITIS TYPE: Primary | ICD-10-CM

## 2024-04-16 DIAGNOSIS — M25.561 ACUTE PAIN OF RIGHT KNEE: ICD-10-CM

## 2024-04-16 DIAGNOSIS — M25.361 INSTABILITY OF RIGHT KNEE JOINT: ICD-10-CM

## 2024-04-16 NOTE — TELEPHONE ENCOUNTER
Pt left voicemail on this MA's phone line advising she would like a physical therapy order for her knees. Order put in, and I let pt know she can give [employer] a call and if they needed a order or anything they can give me a call and I'll fax it over. No further needs expressed.

## 2024-04-23 ENCOUNTER — OFFICE VISIT (OUTPATIENT)
Dept: BEHAVIORAL HEALTH | Facility: CLINIC | Age: 54
End: 2024-04-23
Payer: COMMERCIAL

## 2024-04-23 VITALS
WEIGHT: 164 LBS | DIASTOLIC BLOOD PRESSURE: 72 MMHG | BODY MASS INDEX: 29.06 KG/M2 | HEIGHT: 63 IN | HEART RATE: 77 BPM | OXYGEN SATURATION: 98 % | SYSTOLIC BLOOD PRESSURE: 116 MMHG

## 2024-04-23 DIAGNOSIS — F51.05 INSOMNIA DUE TO OTHER MENTAL DISORDER: ICD-10-CM

## 2024-04-23 DIAGNOSIS — F33.41 RECURRENT MAJOR DEPRESSIVE DISORDER, IN PARTIAL REMISSION: ICD-10-CM

## 2024-04-23 DIAGNOSIS — F41.9 ANXIETY: ICD-10-CM

## 2024-04-23 DIAGNOSIS — F99 INSOMNIA DUE TO OTHER MENTAL DISORDER: ICD-10-CM

## 2024-04-23 PROCEDURE — 99214 OFFICE O/P EST MOD 30 MIN: CPT | Performed by: REGISTERED NURSE

## 2024-04-23 RX ORDER — TRAZODONE HYDROCHLORIDE 50 MG/1
50 TABLET ORAL NIGHTLY
Qty: 90 TABLET | Refills: 0 | Status: SHIPPED | OUTPATIENT
Start: 2024-04-23

## 2024-04-23 RX ORDER — FLUOXETINE HYDROCHLORIDE 40 MG/1
40 CAPSULE ORAL DAILY
Qty: 90 CAPSULE | Refills: 0 | Status: SHIPPED | OUTPATIENT
Start: 2024-04-23

## 2024-04-23 NOTE — PROGRESS NOTES
Follow Up Office Visit      Patient Name: Mona Wilkerson  : 1970   MRN: [id number]     Referring Provider: Deepti Glez DO    Chief Complaint:      ICD-10-CM ICD-9-CM   1. Recurrent major depressive disorder, in partial remission  F33.41 296.35   2. Anxiety  F41.9 300.00   3. Insomnia due to other mental disorder  F51.05 300.9    F99 327.02        History of Present Illness:   Mona Wilkerson is a 54 y.o. female who is here today for follow up and medication management    Subjective      Patient Reports: that she is doing great and she had a fantastic trip to [facility]. She says the vacation is just what she needed. She reports that drinking alcohol did hinder her vacation and she ended up saying things to other people that she shouldn't have. She reports that she realized she wasn't feeling good so she stopped drinking on 2024. She states she noticed she had sweaty palms after stopping but had no other withdrawal symptoms. She states that she relied on alcohol to sleep. She reports she yawns excessively in the day and has signed up for a sleep study. She states she has cut out caffeine and is no longer drinking tea. She reports some word finding problems but says that she realizes it may take up to one year for the long term effects of alcohol to wear off. Reports she is motivated and has lost 10-13 lbs. She reports feeling good overall. She reports poor concentration at work because she is internalizing things and thinks about things she has said. She states she is irritated with her living situation and somewhat concerned about the upkeep and caring for her aging parents. She reports she is interested in therapy again. She denies SI/HI/AVH.    Review of Systems:   Review of Systems   Constitutional:  Positive for appetite change and unexpected weight change.   Eyes:  Negative for visual disturbance.   Respiratory:  Negative for chest tightness and shortness of breath.     Cardiovascular:  Negative for chest pain.   Musculoskeletal:  Negative for gait problem.   Skin:  Negative for rash and wound.   Neurological:  Negative for dizziness, tremors, seizures, weakness and light-headedness.   Psychiatric/Behavioral:  Positive for decreased concentration and sleep disturbance. Negative for agitation, behavioral problems, confusion, hallucinations, self-injury and suicidal ideas. The patient is nervous/anxious. The patient is not hyperactive.    Sleep pattern: Sleeping 7-8 hours on average and feels more rested than previously. Reports Trazodone is helping  Appetite: Increased, was decreased related to alcohol.      Screening Scores:   PHQ-9 : 8  CARLEY-7 : 6    RISK ASSESSMENT:  Patient denies any thoughts or intent of suicide today. Patient denies any impulsive behavior today.     Medications:     Current Outpatient Medications:     clobetasol (TEMOVATE) 0.05 % cream, Apply 1 Application topically to the appropriate area as directed 2 (Two) Times a Day., Disp: , Rfl:     estradiol (Vagifem) 10 MCG tablet vaginal tablet, Insert 1 tablet into the vagina 2 (Two) Times a Week., Disp: 24 tablet, Rfl: 3    fluocinonide (LIDEX) 0.05 % external solution, Apply  topically to the appropriate area as directed Daily., Disp: , Rfl:     FLUoxetine (PROzac) 40 MG capsule, Take 1 capsule by mouth Daily., Disp: 90 capsule, Rfl: 0    ketoconazole (NIZORAL) 2 % shampoo, , Disp: , Rfl:     levothyroxine (SYNTHROID, LEVOTHROID) 50 MCG tablet, Take 1 tablet by mouth Daily., Disp: 90 tablet, Rfl: 3    lisinopril (PRINIVIL,ZESTRIL) 40 MG tablet, Take 1 tablet by mouth Daily., Disp: 90 tablet, Rfl: 3    omeprazole (priLOSEC) 20 MG capsule, Take 1 capsule by mouth Daily., Disp: 90 capsule, Rfl: 3    traZODone (DESYREL) 50 MG tablet, Take 1 tablet by mouth Every Night., Disp: 90 tablet, Rfl: 0    Medication Considerations:  NEREIDA reviewed and appropriate.      Allergies:   Allergies   Allergen Reactions     "Amlodipine Swelling       Results Reviewed:   Labs collected on 3/5/2024   2018 pharmacogenetic testing reviewed    Objective     Physical Exam:  Vital Signs:   Vitals:    04/23/24 1246 04/23/24 1248   BP:  116/72   Pulse:  77   SpO2:  98%   Weight: 74.4 kg (164 lb)    Height: 160 cm (63\")      Body mass index is 29.05 kg/m².     Mental Status Exam:   Hygiene:   good  Cooperation:  Cooperative  Eye Contact:  Good  Psychomotor Behavior:  Appropriate  Affect:  Appropriate  Mood: normal  Speech:  Normal  Thought Process:  Goal directed and Linear  Thought Content:  Normal  Suicidal:  None  Homicidal:  None  Hallucinations:  None  Delusion:  None  Memory:  Intact  Orientation:  Person, Place, Time, and Situation  Reliability:  good  Insight:  Fair  Judgement:  Fair  Impulse Control:  Fair  Physical/Medical Issues:   see problem list      Assessment / Plan      Visit Diagnosis/Orders Placed This Visit:  Diagnoses and all orders for this visit:    1. Recurrent major depressive disorder, in partial remission  -     FLUoxetine (PROzac) 40 MG capsule; Take 1 capsule by mouth Daily.  Dispense: 90 capsule; Refill: 0    2. Anxiety  -     FLUoxetine (PROzac) 40 MG capsule; Take 1 capsule by mouth Daily.  Dispense: 90 capsule; Refill: 0    3. Insomnia due to other mental disorder  -     FLUoxetine (PROzac) 40 MG capsule; Take 1 capsule by mouth Daily.  Dispense: 90 capsule; Refill: 0  -     traZODone (DESYREL) 50 MG tablet; Take 1 tablet by mouth Every Night.  Dispense: 90 tablet; Refill: 0         Functional Status: No impairment    Prognosis: Good with Ongoing Treatment     Impression/Formulation:  Patient appeared alert and oriented.  Patient is voluntarily requesting to continue outpatient psychiatric treatment at [facility].  Patient is receptive to assistance with maintaining a stable lifestyle.  Patient presents with history of     ICD-10-CM ICD-9-CM   1. Recurrent major depressive disorder, in " partial remission  F33.41 296.35   2. Anxiety  F41.9 300.00   3. Insomnia due to other mental disorder  F51.05 300.9    F99 327.02   .    Reviewed patient's previous provider notes. Reviewed most recent labs. Patient meets DSM V diagnostic criteria for diagnoses. Diagnoses may be updated as more information becomes available.       Treatment Plan:   Continue Prozac 40 mg once daily  Continue trazodone 50 mg once daily at bedtime  Patient to restart therapy with [facility]  Encouraged continued abstinence from alcohol  Follow up in 3 months or sooner if needed  Patient will continue supportive psychotherapy efforts and medications as indicated. Clinic will obtain release of information for current treatment team for continuity of care as needed. Patient will contact this office, call 911 or present to the nearest emergency room should suicidal or homicidal ideations occur.  Discussed medication options and treatment plan of prescribed medication(s) as well as the risks, benefits, and potential side effects. Patient acknowledged and verbally consented to continue with current treatment plan and was educated on the importance of compliance with treatment and follow-up appointments.     Patient instructions:  Instructed will take 4-6 weeks for full therapeutic effect. Medication risks and side effects discussed with patient including risk for worsening mood, changes in behavior, thoughts of suicide or homicide, induction of sal, serotonin syndrome, withdrawal symptoms if abrupt withdrawal, sexual dysfunction.   If any thoughts of SI or HI, worsening mood or changes in behavior, call 911 or crisis line [phone], or go to nearest ER at once. Pt.verbalizes understanding and consents to treatment with this medication.   Advised patient to abstain from alcohol use and decrease caffeine intake.     Follow Up:   Return in about 3 months (around 7/23/2024) for medication check .        MARIA L Silverio, PMP-BC  [facility]

## 2024-04-25 DIAGNOSIS — G47.10 HYPERSOMNOLENCE: Primary | ICD-10-CM

## 2024-04-30 ENCOUNTER — OFFICE VISIT (OUTPATIENT)
Dept: SLEEP MEDICINE | Facility: CLINIC | Age: 54
End: 2024-04-30
Payer: COMMERCIAL

## 2024-04-30 VITALS
OXYGEN SATURATION: 97 % | HEIGHT: 63 IN | DIASTOLIC BLOOD PRESSURE: 72 MMHG | BODY MASS INDEX: 28.99 KG/M2 | TEMPERATURE: 97.7 F | WEIGHT: 163.6 LBS | HEART RATE: 77 BPM | SYSTOLIC BLOOD PRESSURE: 112 MMHG

## 2024-04-30 DIAGNOSIS — F51.04 PSYCHOPHYSIOLOGICAL INSOMNIA: ICD-10-CM

## 2024-04-30 DIAGNOSIS — R29.818 SUSPECTED SLEEP APNEA: ICD-10-CM

## 2024-04-30 DIAGNOSIS — I10 PRIMARY HYPERTENSION: Primary | ICD-10-CM

## 2024-04-30 DIAGNOSIS — G47.19 EXCESSIVE DAYTIME SLEEPINESS: ICD-10-CM

## 2024-04-30 DIAGNOSIS — R06.83 SNORING: ICD-10-CM

## 2024-04-30 PROCEDURE — 99213 OFFICE O/P EST LOW 20 MIN: CPT | Performed by: NURSE PRACTITIONER

## 2024-04-30 RX ORDER — CICLOPIROX 1 G/100ML
SHAMPOO TOPICAL
COMMUNITY
Start: 2024-04-24

## 2024-04-30 NOTE — PROGRESS NOTES
Chief Complaint:   Chief Complaint   Patient presents with    Follow-up    Sleeping Problem       HPI:    Mona Wilkerson is a 54 y.o. female here for follow-up of suspected sleep apnea.       54year-old female with past medical history of hypothyroidism, GERD, anxiety/depression, chronic smoking who has since quit l alcohol abuse, last drink February 14, 2024 presented for initial evaluation 5/24/2022..  Patient stated that she felt  fatigued all day long.  She has been told that she snores loudly.  She has not woken herself up snoring or with apneas.  Denied any orthopnea or PND symptoms.  She states that she is on trazodone and has been for a number of years after her bout with depression where she was admitted in the [facility]. .   Patient was originally unaware of the cost of what this study would be but has met her deductible for the year and would like to move forward with HST.  Will certainly get that ordered for her today.  Even though she is excessively sleepy states she cannot sleep during the day and puts her [place] score at 0/24.    I did reiterate the consequences of untreated sleep apnea such as hypertension, atrial fibrillation, stroke, early onset dementia and sudden cardiac death.  We also reviewed different therapies available to her such as CPAP, MAD, or ENT referral.  Patient verbalizes understanding.  Patient is eager to have HST to determine if she has sleep apnea.  Current medications are:   Current Outpatient Medications:     ciclopirox (LOPROX) 1 % shampoo, , Disp: , Rfl:     clobetasol (TEMOVATE) 0.05 % cream, Apply 1 Application topically to the appropriate area as directed 2 (Two) Times a Day., Disp: , Rfl:     estradiol (Vagifem) 10 MCG tablet vaginal tablet, Insert 1 tablet into the vagina 2 (Two) Times a Week., Disp: 24 tablet, Rfl: 3    fluocinonide (LIDEX) 0.05 % external solution, Apply  topically to the appropriate area as directed Daily., Disp: , Rfl:     FLUoxetine (PROzac) 40  MG capsule, Take 1 capsule by mouth Daily., Disp: 90 capsule, Rfl: 0    ketoconazole (NIZORAL) 2 % shampoo, , Disp: , Rfl:     levothyroxine (SYNTHROID, LEVOTHROID) 50 MCG tablet, Take 1 tablet by mouth Daily., Disp: 90 tablet, Rfl: 3    lisinopril (PRINIVIL,ZESTRIL) 40 MG tablet, Take 1 tablet by mouth Daily., Disp: 90 tablet, Rfl: 3    omeprazole (priLOSEC) 20 MG capsule, Take 1 capsule by mouth Daily., Disp: 90 capsule, Rfl: 3    traZODone (DESYREL) 50 MG tablet, Take 1 tablet by mouth Every Night., Disp: 90 tablet, Rfl: 0.      The patient's relevant past medical, surgical, family and social history were reviewed and updated in Epic as appropriate.       Review of Systems   Constitutional:  Positive for fatigue.   HENT:  Positive for postnasal drip.    Gastrointestinal:         Heartburn   Genitourinary:  Positive for urgency.   Musculoskeletal:  Positive for back pain, neck pain and neck stiffness.   Allergic/Immunologic: Positive for environmental allergies.   Psychiatric/Behavioral:  Positive for dysphoric mood and sleep disturbance. The patient is nervous/anxious.    All other systems reviewed and are negative.        Objective:    Physical Exam  Constitutional:       Appearance: Normal appearance.   HENT:      Head: Normocephalic and atraumatic.      Mouth/Throat:      Mouth: Mucous membranes are moist.      Pharynx: Oropharynx is clear.      Comments: Mallampati 2 anatomy  Cardiovascular:      Rate and Rhythm: Normal rate and regular rhythm.   Pulmonary:      Effort: Pulmonary effort is normal.      Breath sounds: Normal breath sounds.   Skin:     General: Skin is warm and dry.   Neurological:      Mental Status: She is alert and oriented to person, place, and time.   Psychiatric:         Mood and Affect: Mood normal.         Behavior: Behavior normal.         Thought Content: Thought content normal.         Judgment: Judgment normal.             ASSESSMENT/PLAN    Diagnoses and all orders for this  visit:    1. Primary hypertension (Primary)  -     Home Sleep Study; Future    2. Suspected sleep apnea  -     Home Sleep Study; Future    3. Snoring  -     Home Sleep Study; Future    4. Psychophysiological insomnia  -     Home Sleep Study; Future    5. Excessive daytime sleepiness  -     Home Sleep Study; Future        Counseled patient regarding multimodal approach with healthy nutrition, healthy sleep, regular physical activity, social activities, counseling, and medications. Encouraged to practice lateral sleep position. Avoid alcohol and sedatives close to bedtime.    Patient certainly has a strong story for sleep apnea and due to the consequences of untreated sleep apnea we will move forward with HST and follow-up as appropriate.      Signed by  Amy Begum, APRN    April 30, 2024      CC: Deepti Glez DO         No ref. provider found

## 2024-05-07 DIAGNOSIS — N95.2 ATROPHY OF VAGINA: ICD-10-CM

## 2024-05-07 NOTE — TELEPHONE ENCOUNTER
"    Caller: Mona Wilkerson \"Ramy\"    Relationship: Self    Best call back number: [phone]     Requested Prescriptions:   Requested Prescriptions     Pending Prescriptions Disp Refills    estradiol (Vagifem) 10 MCG tablet vaginal tablet 24 tablet 3     Sig: Insert 1 tablet into the vagina 2 (Two) Times a Week.      YEARLY SUPPLY  Pharmacy where request should be sent: [facility]     Last office visit with prescribing clinician: 3/5/2024   Last telemedicine visit with prescribing clinician: Visit date not found   Next office visit with prescribing clinician: 3/6/2025     Additional details provided by patient: 1 WEEK LEFT    Does the patient have less than a 3 day supply:  [] Yes  [x] No    Would you like a call back once the refill request has been completed: [] Yes [x] No    If the office needs to give you a call back, can they leave a voicemail: [] Yes [x] No    Lamin Tobar Rep   05/07/24 15:56 EDT       "

## 2024-05-09 ENCOUNTER — OFFICE VISIT (OUTPATIENT)
Dept: BEHAVIORAL HEALTH | Facility: CLINIC | Age: 54
End: 2024-05-09
Payer: COMMERCIAL

## 2024-05-09 ENCOUNTER — OFFICE VISIT (OUTPATIENT)
Dept: ORTHOPEDIC SURGERY | Facility: CLINIC | Age: 54
End: 2024-05-09
Payer: COMMERCIAL

## 2024-05-09 VITALS
SYSTOLIC BLOOD PRESSURE: 124 MMHG | DIASTOLIC BLOOD PRESSURE: 82 MMHG | BODY MASS INDEX: 29.55 KG/M2 | WEIGHT: 160.6 LBS | HEIGHT: 62 IN

## 2024-05-09 DIAGNOSIS — F10.21 ALCOHOL USE DISORDER, SEVERE, IN EARLY REMISSION: ICD-10-CM

## 2024-05-09 DIAGNOSIS — M17.10 PATELLOFEMORAL ARTHRITIS: Primary | ICD-10-CM

## 2024-05-09 DIAGNOSIS — F33.41 RECURRENT MAJOR DEPRESSIVE DISORDER, IN PARTIAL REMISSION: Primary | ICD-10-CM

## 2024-05-09 DIAGNOSIS — M25.561 ACUTE PAIN OF BOTH KNEES: ICD-10-CM

## 2024-05-09 DIAGNOSIS — M25.562 ACUTE PAIN OF BOTH KNEES: ICD-10-CM

## 2024-05-09 DIAGNOSIS — F41.9 ANXIETY: ICD-10-CM

## 2024-05-09 DIAGNOSIS — M17.0 PRIMARY OSTEOARTHRITIS OF BOTH KNEES: ICD-10-CM

## 2024-05-09 RX ORDER — MELOXICAM 15 MG/1
15 TABLET ORAL DAILY
Qty: 90 TABLET | Refills: 1 | Status: SHIPPED | OUTPATIENT
Start: 2024-05-09

## 2024-05-09 NOTE — TELEPHONE ENCOUNTER
Rx Refill Note  Requested Prescriptions     Pending Prescriptions Disp Refills    estradiol (Vagifem) 10 MCG tablet vaginal tablet 24 tablet 3     Sig: Insert 1 tablet into the vagina 2 (Two) Times a Week.      Last office visit with prescribing clinician: 3/5/2024     Next office visit with prescribing clinician: 3/6/2025     SENT BY ANOTHER PROVIDER DO YOU WANT TO CONTINUE?    Carey Dalton MA  05/09/24, 08:22 EDT

## 2024-05-13 RX ORDER — ESTRADIOL 10 UG/1
1 INSERT VAGINAL 2 TIMES WEEKLY
Qty: 24 TABLET | Refills: 3 | Status: SHIPPED | OUTPATIENT
Start: 2024-05-13

## 2024-06-04 ENCOUNTER — HOSPITAL ENCOUNTER (OUTPATIENT)
Dept: SLEEP MEDICINE | Facility: HOSPITAL | Age: 54
End: 2024-06-04
Payer: COMMERCIAL

## 2024-06-04 VITALS — WEIGHT: 160 LBS | HEIGHT: 62 IN | BODY MASS INDEX: 29.44 KG/M2

## 2024-06-04 DIAGNOSIS — I10 PRIMARY HYPERTENSION: ICD-10-CM

## 2024-06-04 DIAGNOSIS — G47.19 EXCESSIVE DAYTIME SLEEPINESS: ICD-10-CM

## 2024-06-04 DIAGNOSIS — F51.04 PSYCHOPHYSIOLOGICAL INSOMNIA: ICD-10-CM

## 2024-06-04 DIAGNOSIS — R06.83 SNORING: ICD-10-CM

## 2024-06-04 DIAGNOSIS — R29.818 SUSPECTED SLEEP APNEA: ICD-10-CM

## 2024-06-04 PROCEDURE — 95806 SLEEP STUDY UNATT&RESP EFFT: CPT

## 2024-06-10 DIAGNOSIS — G47.33 OSA (OBSTRUCTIVE SLEEP APNEA): Primary | ICD-10-CM

## 2024-06-12 ENCOUNTER — TELEPHONE (OUTPATIENT)
Dept: SLEEP MEDICINE | Facility: CLINIC | Age: 54
End: 2024-06-12
Payer: COMMERCIAL

## 2024-08-01 DIAGNOSIS — F99 INSOMNIA DUE TO OTHER MENTAL DISORDER: ICD-10-CM

## 2024-08-01 DIAGNOSIS — F51.05 INSOMNIA DUE TO OTHER MENTAL DISORDER: ICD-10-CM

## 2024-08-01 LAB
NCCN CRITERIA FLAG: ABNORMAL
TYRER CUZICK SCORE: 21.6

## 2024-08-01 RX ORDER — TRAZODONE HYDROCHLORIDE 50 MG/1
50 TABLET ORAL NIGHTLY
Qty: 90 TABLET | Refills: 0 | OUTPATIENT
Start: 2024-08-01

## 2024-08-06 DIAGNOSIS — Z91.89 AT HIGH RISK FOR BREAST CANCER: Primary | ICD-10-CM

## 2024-08-06 LAB
NCCN CRITERIA FLAG: ABNORMAL
TYRER CUZICK SCORE: 25.2

## 2024-08-08 ENCOUNTER — HOSPITAL ENCOUNTER (OUTPATIENT)
Dept: MAMMOGRAPHY | Facility: HOSPITAL | Age: 54
Discharge: HOME | End: 2024-08-08
Payer: COMMERCIAL

## 2024-08-08 ENCOUNTER — HOSPITAL ENCOUNTER (OUTPATIENT)
Dept: ULTRASOUND IMAGING | Facility: HOSPITAL | Age: 54
Discharge: HOME | End: 2024-08-08
Payer: COMMERCIAL

## 2024-08-08 ENCOUNTER — HOSPITAL ENCOUNTER (OUTPATIENT)
Dept: CT IMAGING | Facility: HOSPITAL | Age: 54
Discharge: HOME | End: 2024-08-08
Payer: COMMERCIAL

## 2024-08-08 DIAGNOSIS — Z87.891 PERSONAL HISTORY OF NICOTINE DEPENDENCE: ICD-10-CM

## 2024-08-08 DIAGNOSIS — Z12.2 SCREENING FOR LUNG CANCER: ICD-10-CM

## 2024-08-08 DIAGNOSIS — Z00.00 ENCOUNTER FOR MEDICAL EXAMINATION TO ESTABLISH CARE: ICD-10-CM

## 2024-08-08 DIAGNOSIS — F10.21 ALCOHOLISM IN RECOVERY: ICD-10-CM

## 2024-08-08 DIAGNOSIS — Z12.31 ENCOUNTER FOR SCREENING MAMMOGRAM FOR MALIGNANT NEOPLASM OF BREAST: ICD-10-CM

## 2024-08-08 PROCEDURE — 77067 SCR MAMMO BI INCL CAD: CPT

## 2024-08-08 PROCEDURE — 77063 BREAST TOMOSYNTHESIS BI: CPT

## 2024-08-08 PROCEDURE — 76705 ECHO EXAM OF ABDOMEN: CPT

## 2024-08-08 PROCEDURE — 71271 CT THORAX LUNG CANCER SCR C-: CPT

## 2024-08-22 DIAGNOSIS — F33.41 RECURRENT MAJOR DEPRESSIVE DISORDER, IN PARTIAL REMISSION: ICD-10-CM

## 2024-08-22 DIAGNOSIS — F41.9 ANXIETY: ICD-10-CM

## 2024-08-22 DIAGNOSIS — F99 INSOMNIA DUE TO OTHER MENTAL DISORDER: ICD-10-CM

## 2024-08-22 DIAGNOSIS — F51.05 INSOMNIA DUE TO OTHER MENTAL DISORDER: ICD-10-CM

## 2024-08-22 NOTE — TELEPHONE ENCOUNTER
"Caller: Mona Wilkerson \"Ramy\"    Relationship: Self    Best call back number: [phone]     Requested Prescriptions:   Requested Prescriptions     Pending Prescriptions Disp Refills    FLUoxetine (PROzac) 40 MG capsule 90 capsule 0     Sig: Take 1 capsule by mouth Daily.        Pharmacy where request should be sent: [facility]     Last office visit with prescribing clinician: 3/5/2024   Last telemedicine visit with prescribing clinician: Visit date not found   Next office visit with prescribing clinician: 3/6/2025     Does the patient have less than a 3 day supply:  [x] Yes  [] No    Would you like a call back once the refill request has been completed: [] Yes [x] No    If the office needs to give you a call back, can they leave a voicemail: [] Yes [x] No    Lamin Freeman Rep   08/22/24 15:43 EDT     "

## 2024-08-23 RX ORDER — FLUOXETINE HYDROCHLORIDE 40 MG/1
40 CAPSULE ORAL DAILY
Qty: 90 CAPSULE | Refills: 0 | Status: SHIPPED | OUTPATIENT
Start: 2024-08-23

## 2024-10-14 ENCOUNTER — OFFICE VISIT (OUTPATIENT)
Age: 54
End: 2024-10-14
Payer: COMMERCIAL

## 2024-10-14 VITALS
DIASTOLIC BLOOD PRESSURE: 88 MMHG | RESPIRATION RATE: 16 BRPM | OXYGEN SATURATION: 98 % | SYSTOLIC BLOOD PRESSURE: 127 MMHG | WEIGHT: 180 LBS | BODY MASS INDEX: 33.13 KG/M2 | HEIGHT: 62 IN | HEART RATE: 71 BPM | TEMPERATURE: 97.8 F

## 2024-10-14 DIAGNOSIS — Z91.89 AT HIGH RISK FOR BREAST CANCER: Primary | ICD-10-CM

## 2024-10-14 PROCEDURE — 99215 OFFICE O/P EST HI 40 MIN: CPT | Performed by: NURSE PRACTITIONER

## 2024-10-14 NOTE — PROGRESS NOTES
High Risk Oncology Clinic New Patient    Date of Encounter: 10/14/2024     Mona Wilkerson  [id number]  1970    Referred By: Dr. Deepti Glez  PCP: Deepti Glez DO    Reason for Visit:  High Risk for Cancer    Problem List:  1. High Risk for Breast Cancer  A. Mammogram:     24 Mammogram:  Heterogeneously dense; BI-RADS 1  B. Breast MRI:   No history  C. Breast Biopsy:   No history  D.Tyrer-Cuzick Results:  25.2%  E. Genetic Testing:  No history  F. Last Pap Smear:   Negative  G. EGD/Colonoscopy/Cologuard:  2024 EGD:  Reflux esophagitis;  colonoscopy.  Tubular adenoma  H. Ovarian Cancer Screening:  TVUS     2. GERD  3. HLP  4. Hypothyroidism  5. HTN  6.  History of Heavy ETOH use, quit 24   7.  Former tobacco abuse, quit   8. BMI 32.9    History of Present Illness:     Mona Wilkerson is a 54 y.o. year old female here today for initial consultation in the high risk oncology clinic. She was referred to our clinic due to an elevated Tyrer-Cuzick score of 25.2% on recent screening mammogram questionnaire.  She has a family history of breast cancer in her mother at age 51 and maternal grandmother at age 49.  Pt has not undergone genetic testing.   Intermittently performing monthly self breast exams.  Negative for breast discharge, mass, unilateral pain, asymmetry or other recent changes.    Pertinent Oncology Family History (Age of diagnosis):    Mother:  Breast 51, MDS  Father:  MM   Maternal Grandparents:  Grandmother Breast 49    Genetic Testing in Family Members:   Negative Sister    Reproductive:    Age of first menstrual period:  10    Age of first live birth:  N/A  Pre/Leslee/Postmenopause:  Leslee  Retains ovaries/uterus:  Yes  LMP:  Ablation,   HRT use:  No  Contraception use:  No      Review of Systems   Constitutional: Negative for weight loss.   Cardiovascular:  Negative for chest pain.   Respiratory:  Negative for shortness of breath.     Hematologic/Lymphatic: Negative for adenopathy.   Skin:  Negative for suspicious lesions.   Neurological:  Negative for headaches.       Allergies:  Allergies   Allergen Reactions    Amlodipine Swelling         Current Outpatient Medications:     ciclopirox (LOPROX) 1 % shampoo, , Disp: , Rfl:     estradiol (Vagifem) 10 MCG tablet vaginal tablet, Insert 1 tablet into the vagina 2 (Two) Times a Week., Disp: 24 tablet, Rfl: 3    FLUoxetine (PROzac) 40 MG capsule, Take 1 capsule by mouth Daily., Disp: 90 capsule, Rfl: 0    levothyroxine (SYNTHROID, LEVOTHROID) 50 MCG tablet, Take 1 tablet by mouth Daily., Disp: 90 tablet, Rfl: 3    lisinopril (PRINIVIL,ZESTRIL) 40 MG tablet, Take 1 tablet by mouth Daily., Disp: 90 tablet, Rfl: 3    meloxicam (MOBIC) 15 MG tablet, Take 1 tablet by mouth Daily with food, Disp: 90 tablet, Rfl: 1    omeprazole (priLOSEC) 20 MG capsule, Take 1 capsule by mouth Daily., Disp: 90 capsule, Rfl: 3    traZODone (DESYREL) 50 MG tablet, Take 1 tablet by mouth Every Night., Disp: 90 tablet, Rfl: 0    The current medication list and allergy list were reviewed and reconciled.     Social History     Socioeconomic History    Marital status:    Tobacco Use    Smoking status: Former     Current packs/day: 0.00     Average packs/day: 0.5 packs/day for 30.0 years (15.0 ttl pk-yrs)     Types: Cigarettes     Start date: 1992     Quit date: 2022     Years since quittin.4    Smokeless tobacco: Never    Tobacco comments:     last cigarette 4/15/2022   Vaping Use    Vaping status: Never Used   Substance and Sexual Activity    Alcohol use: Not Currently     Alcohol/week: 6.0 standard drinks of alcohol     Types: 6 Cans of beer per week     Comment: quit 24    Drug use: No    Sexual activity: Not Currently     Partners: Male     Birth control/protection: Essure     Comment: Essure       Past Medical History:   Diagnosis Date    Abnormal Pap smear of cervix     ADHD     Alcoholism in  recovery 03/05/2024    Cervical disc disorder     Colon polyp 2018    Cyst of ovary 11/21/2016    Depression     Depression with anxiety 11/04/2016    seeing [facility]- doing well  off lithium  changed to lamictal getting adhd testing    GERD (gastroesophageal reflux disease)     Hyperlipidemia 11/04/2016 26 Jan 2016  reviewed lipid panel with her     Hypertension 10/07/2022    Hypothyroidism     Insomnia due to other mental disorder 04/30/2021    Low back pain     Lumbar radiculopathy 07/05/2023    Lumbosacral disc disease     Nausea and vomiting 01/14/2022    Primary insomnia 11/21/2016    Sleep difficulties     Stage 3a chronic kidney disease 05/05/2021    Tobacco use 04/30/2021       Past Surgical History:   Procedure Laterality Date    COLONOSCOPY      COLONOSCOPY W/ POLYPECTOMY  10/29/2021    Dr. Nice, diverticulosis and removal of tubular adenoma    ENDOMETRIAL ABLATION W/ NOVASURE      ENDOSCOPY  2018    ESSURE TUBAL LIGATION      EYE SURGERY  2002    Lasik    LASIK Bilateral     LIPOSUCTION      RHINOPLASTY         Family History   Problem Relation Age of Onset    Sleep disorder Mother     Thyroid disease Mother     Breast cancer Mother 51    Hypertension Mother         Essential HTN    Hyperlipidemia Mother     Myelodysplastic syndrome Mother     Cancer Mother         Breast CA, lumpectomy w/ nodes    Hypertension Father         Essential HTN    Hyperlipidemia Father     Myelodysplastic syndrome Father     Multiple myeloma Father     Deep vein thrombosis Father     Alcohol abuse Sister     Hypertension Sister         Essential HTN    Hyperlipidemia Sister     Migraines Sister         Migraine headaches    Sleep disorder Sister     Arthritis Sister         Rheumatoid/Psoriatic?    Hypertension Sister     Hypothyroidism Sister     Hyperlipidemia Sister     Thyroid disease Sister     Diabetes Paternal Aunt     Breast cancer Maternal Grandmother 49    Breast cancer Other 51    Breast cancer Other 56    Ovarian  "cancer Neg Hx     Colon cancer Neg Hx     Liver cancer Neg Hx     Rectal cancer Neg Hx     Stomach cancer Neg Hx        Past Medical History, Past Surgical History, Social History, Family History have been reviewed and are without significant changes except as mentioned.    Physical Exam:    /88   Pulse 71   Temp 97.8 °F (36.6 °C)   Resp 16   Ht 157.5 cm (62\")   Wt 81.6 kg (180 lb)   SpO2 98%   BMI 32.92 kg/m²   Pain Score    10/14/24 0956   PainSc: 0-No pain                    Physical Exam  Vitals and nursing note reviewed.   Constitutional:       Appearance: Normal appearance.   HENT:      Head: Normocephalic and atraumatic.   Cardiovascular:      Rate and Rhythm: Normal rate and regular rhythm.      Heart sounds: Normal heart sounds, S1 normal and S2 normal. No murmur heard.  Pulmonary:      Effort: Pulmonary effort is normal.      Breath sounds: Normal breath sounds.   Chest:   Breasts:     Right: No swelling, inverted nipple, mass, nipple discharge, skin change or tenderness.      Left: No swelling, inverted nipple, mass, nipple discharge, skin change or tenderness.   Abdominal:      Palpations: Abdomen is soft.      Tenderness: There is no abdominal tenderness.   Musculoskeletal:         General: Normal range of motion.      Cervical back: Neck supple.      Right lower leg: No edema.      Left lower leg: No edema.   Lymphadenopathy:      Cervical: No cervical adenopathy.      Upper Body:      Right upper body: No axillary adenopathy.      Left upper body: No axillary adenopathy.   Skin:     General: Skin is warm and dry.   Neurological:      General: No focal deficit present.      Mental Status: She is alert and oriented to person, place, and time.   Psychiatric:         Mood and Affect: Mood normal.         Behavior: Behavior normal. Behavior is cooperative.         Thought Content: Thought content normal.         Judgment: Judgment normal.          Lab Results   Component Value Date    WBC 4.82 " 03/05/2024    HGB 14.4 03/05/2024    HCT 42.4 03/05/2024    MCV 93.8 03/05/2024     03/05/2024        Lab Results   Component Value Date    GLUCOSE 74 03/05/2024    BUN 10 03/05/2024    CREATININE 0.99 03/05/2024     03/05/2024    K 4.9 03/05/2024     03/05/2024    CALCIUM 10.0 03/05/2024    PROTEINTOT 7.5 03/05/2024    ALBUMIN 4.7 03/05/2024    ALT 36 (H) 03/05/2024    AST 31 03/05/2024    ALKPHOS 107 03/05/2024    BILITOT 0.2 03/05/2024    GLOB 2.8 03/05/2024    AGRATIO 1.7 03/05/2024    BCR 10.1 03/05/2024    ANIONGAP 13.3 03/05/2024    EGFR 67.5 03/05/2024       Lab Results   Component Value Date    HGBA1C 5.50 03/05/2024       No results found.    Assessment and Plan:    Diagnoses and all orders for this visit:    1. At high risk for breast cancer (Primary)      -Monthly breast exam  -Annual Breast MRI Feb 2025  -Annual screening mammography Aug 2025  -Clinical breast exam every 6 months.   -Tyrer Cuzick score:  25.2% lifetime risk of developing breast cancer.  Average risk for women is ~13%.  -No history of genetic testing.  Qualifies per NCCN with maternal grandmother being age 49.  Will let me know if she is interested.  -Currently perimenopausal.  Discussed risks benefits of tamoxifan/anastrozole therapy.  Pt will let me know if she is interested in initiating.  -Educated pt on NCCN recommendations related to diagnosis.    -Follow up 6 months with clinical breast exam    I spent 43 minutes caring for Mona.  This includes time spent by me in the following activities:  preparing for visit, reviewing tests, obtaining history, performing physical exam, education, ordering necessary medications/testing and documenting in the medical record.     Calli Vital, APRN  10/14/24

## 2024-10-21 ENCOUNTER — PATIENT MESSAGE (OUTPATIENT)
Dept: FAMILY MEDICINE CLINIC | Facility: CLINIC | Age: 54
End: 2024-10-21
Payer: COMMERCIAL

## 2024-10-21 DIAGNOSIS — F51.05 INSOMNIA DUE TO OTHER MENTAL DISORDER: ICD-10-CM

## 2024-10-21 DIAGNOSIS — F41.9 ANXIETY: ICD-10-CM

## 2024-10-21 DIAGNOSIS — F99 INSOMNIA DUE TO OTHER MENTAL DISORDER: ICD-10-CM

## 2024-10-21 DIAGNOSIS — F33.41 RECURRENT MAJOR DEPRESSIVE DISORDER, IN PARTIAL REMISSION: ICD-10-CM

## 2024-10-21 RX ORDER — FLUOXETINE 40 MG/1
40 CAPSULE ORAL DAILY
Qty: 90 CAPSULE | Refills: 3 | Status: SHIPPED | OUTPATIENT
Start: 2024-10-21

## 2024-10-21 RX ORDER — TRAZODONE HYDROCHLORIDE 50 MG/1
50 TABLET, FILM COATED ORAL NIGHTLY
Qty: 90 TABLET | Refills: 3 | Status: SHIPPED | OUTPATIENT
Start: 2024-10-21

## 2024-11-08 ENCOUNTER — OFFICE VISIT (OUTPATIENT)
Dept: FAMILY MEDICINE CLINIC | Facility: CLINIC | Age: 54
End: 2024-11-08
Payer: COMMERCIAL

## 2024-11-08 ENCOUNTER — TELEPHONE (OUTPATIENT)
Dept: FAMILY MEDICINE CLINIC | Facility: CLINIC | Age: 54
End: 2024-11-08
Payer: COMMERCIAL

## 2024-11-08 ENCOUNTER — LAB (OUTPATIENT)
Dept: LAB | Facility: HOSPITAL | Age: 54
End: 2024-11-08
Payer: COMMERCIAL

## 2024-11-08 VITALS
SYSTOLIC BLOOD PRESSURE: 122 MMHG | TEMPERATURE: 98.7 F | HEIGHT: 62 IN | WEIGHT: 186 LBS | DIASTOLIC BLOOD PRESSURE: 80 MMHG | BODY MASS INDEX: 34.23 KG/M2 | HEART RATE: 68 BPM | OXYGEN SATURATION: 98 %

## 2024-11-08 DIAGNOSIS — E03.9 ACQUIRED HYPOTHYROIDISM: Primary | ICD-10-CM

## 2024-11-08 DIAGNOSIS — E03.9 ACQUIRED HYPOTHYROIDISM: ICD-10-CM

## 2024-11-08 DIAGNOSIS — L65.9 HAIR LOSS: ICD-10-CM

## 2024-11-08 LAB
DEPRECATED RDW RBC AUTO: 41.2 FL (ref 37–54)
ERYTHROCYTE [DISTWIDTH] IN BLOOD BY AUTOMATED COUNT: 12.7 % (ref 12.3–15.4)
HCT VFR BLD AUTO: 38.9 % (ref 34–46.6)
HGB BLD-MCNC: 13.2 G/DL (ref 12–15.9)
MCH RBC QN AUTO: 30.3 PG (ref 26.6–33)
MCHC RBC AUTO-ENTMCNC: 33.9 G/DL (ref 31.5–35.7)
MCV RBC AUTO: 89.2 FL (ref 79–97)
PLATELET # BLD AUTO: 285 10*3/MM3 (ref 140–450)
PMV BLD AUTO: 10.4 FL (ref 6–12)
RBC # BLD AUTO: 4.36 10*6/MM3 (ref 3.77–5.28)
WBC NRBC COR # BLD AUTO: 7.66 10*3/MM3 (ref 3.4–10.8)

## 2024-11-08 PROCEDURE — 84480 ASSAY TRIIODOTHYRONINE (T3): CPT

## 2024-11-08 PROCEDURE — 84439 ASSAY OF FREE THYROXINE: CPT

## 2024-11-08 PROCEDURE — 82728 ASSAY OF FERRITIN: CPT

## 2024-11-08 PROCEDURE — 84443 ASSAY THYROID STIM HORMONE: CPT

## 2024-11-08 PROCEDURE — 85027 COMPLETE CBC AUTOMATED: CPT

## 2024-11-08 PROCEDURE — 99214 OFFICE O/P EST MOD 30 MIN: CPT

## 2024-11-08 NOTE — TELEPHONE ENCOUNTER
"Caller: Mona Wilkerson \"Ramy\"    Relationship: Self    Best call back number: [phone]     What medication are you requesting: INCREASE LEVOTHYROXINE DOSAGE     What are your current symptoms: DRY SKIN, NO APPETITE, HAIR LOSS, LARGE GUT, NOT LOSING WEIGHT EVEN WITH NOT EATING, FATIGUE      How long have you been experiencing symptoms: THIS SUMMER. PATIENT STOPPED DRINKING BEER OVER THE SUMMER AND STILL HAS A LARGE GUT.    Have you had these symptoms before:    [x] Yes  [] No    Have you been treated for these symptoms before:   [x] Yes  [] No    If a prescription is needed, what is your preferred pharmacy and phone number: [facility]     Additional notes: IF UNABLE TO INCREASE DOSE, PLEASE LET PATIENT KNOW IF SHE WILL NEED TO COME INTO OFFICE OR IF PCP WANTS TO REFER TO ENDOCRINOLOGY.           "

## 2024-11-08 NOTE — ASSESSMENT & PLAN NOTE
Will evaluate thyroid function today as well as signs of anemia.  If she is continuing to have symptoms recommend endocrinology or follow-up with her PCP.  Recommended low inflammatory diet to help with inflammation.  Recommend gluten-free diet, decreasing processed sugar.  Would also recommend to be Mediterranean diet as well.  Also discussed seeing a dermatologist for hair loss and thinning if thyroid functions within normal limits.

## 2024-11-08 NOTE — PROGRESS NOTES
Office Note     Name: Mona Wilkerson    : 1970     MRN: [id number]     Chief Complaint  Thyroid Problem (Thyroid concerns)    Subjective     History of Present Illness:  Mona Wilkerson is a 54 y.o. female who presents today for thyroid symptoms. PCP is Dr. Glez.  She is a nurse at [employer].  Dx with hypothyroidism in 2016   On 50 mcg of Synthroid for a while.  Wants to have her levels checked.  Quit drinking in February and was hoping it would help with weight loss.   Hair loss, dry skin, stubborn weight gain, cold intolerance, maybe a tremor. No constipation.  Denies sensation of neck tightness or pressure.  Denies feeling any nodules in her neck.      Review of Systems:   Review of Systems   Constitutional:  Negative for chills and fever.   Respiratory:  Negative for chest tightness and shortness of breath.    Cardiovascular:  Negative for chest pain and palpitations.   Gastrointestinal:  Negative for abdominal pain.   Neurological:  Negative for dizziness and light-headedness.       Past Medical History:   Past Medical History:   Diagnosis Date    Abnormal Pap smear of cervix     ADHD     Alcoholism in recovery 2024    Cervical disc disorder     Colon polyp 2018    Cyst of ovary 2016    Depression     Depression with anxiety 2016    seeing [facility]- doing well  off lithium  changed to lamictal getting adhd testing    GERD (gastroesophageal reflux disease)     Hyperlipidemia 2016  reviewed lipid panel with her     Hypertension 10/07/2022    Hypothyroidism     Insomnia due to other mental disorder 2021    Low back pain     Lumbar radiculopathy 2023    Lumbosacral disc disease     Nausea and vomiting 2022    Primary insomnia 2016    Sleep difficulties     Stage 3a chronic kidney disease 2021    Tobacco use 2021       Past Surgical History:   Past Surgical History:   Procedure Laterality Date    COLONOSCOPY       COLONOSCOPY W/ POLYPECTOMY  10/29/2021    Dr. Nice, diverticulosis and removal of tubular adenoma    ENDOMETRIAL ABLATION W/ NOVASURE      ENDOSCOPY  2018    ESSURE TUBAL LIGATION      EYE SURGERY  2002    Lasik    LASIK Bilateral     LIPOSUCTION      RHINOPLASTY         Family History:   Family History   Problem Relation Age of Onset    Sleep disorder Mother     Thyroid disease Mother     Breast cancer Mother 51    Hypertension Mother         Essential HTN    Hyperlipidemia Mother     Myelodysplastic syndrome Mother     Cancer Mother         Breast CA, lumpectomy w/ nodes    Hypertension Father         Essential HTN    Hyperlipidemia Father     Myelodysplastic syndrome Father     Multiple myeloma Father     Deep vein thrombosis Father     Alcohol abuse Sister     Hypertension Sister         Essential HTN    Hyperlipidemia Sister     Migraines Sister         Migraine headaches    Sleep disorder Sister     Arthritis Sister         Rheumatoid/Psoriatic?    Hypertension Sister     Hypothyroidism Sister     Hyperlipidemia Sister     Thyroid disease Sister     Diabetes Paternal Aunt     Breast cancer Maternal Grandmother 49    Breast cancer Other 51    Breast cancer Other 56    Ovarian cancer Neg Hx     Colon cancer Neg Hx     Liver cancer Neg Hx     Rectal cancer Neg Hx     Stomach cancer Neg Hx        Social History:   Social History     Socioeconomic History    Marital status:    Tobacco Use    Smoking status: Former     Current packs/day: 0.00     Average packs/day: 0.5 packs/day for 30.0 years (15.0 ttl pk-yrs)     Types: Cigarettes     Start date: 1992     Quit date: 2022     Years since quittin.5    Smokeless tobacco: Never    Tobacco comments:     last cigarette 4/15/2022   Vaping Use    Vaping status: Never Used   Substance and Sexual Activity    Alcohol use: Not Currently     Alcohol/week: 6.0 standard drinks of alcohol     Types: 6 Cans of beer per week     Comment: quit 24    Drug  "use: No    Sexual activity: Not Currently     Partners: Male     Birth control/protection: Essure     Comment: Essure       Immunizations:   Immunization History   Administered Date(s) Administered    COVID-19 (PFIZER) BIVALENT 12+YRS 01/18/2023    COVID-19 (PFIZER) Purple Cap Monovalent 01/29/2021, 02/23/2021, 10/09/2021    Covid-19 (Pfizer) Gray Cap Monovalent 04/23/2022    Flu Vaccine Intradermal Quad 18-64YR 09/24/2020    Flu Vaccine Quad PF >36MO 10/11/2017    Flu Vaccine Split Quad 10/06/2022    Fluzone Quad >6mos (Multi-dose) 09/16/2017, 10/11/2017    Influenza Injectable Mdck Pf Quad 09/24/2020    Influenza, Unspecified 01/20/2021, 09/23/2021, 10/10/2023    MMR 10/06/2015, 11/06/2015    Shingrix 03/02/2022, 05/06/2022    Tdap 11/06/2015        Medications:     Current Outpatient Medications:     estradiol (Vagifem) 10 MCG tablet vaginal tablet, Insert 1 tablet into the vagina 2 (Two) Times a Week., Disp: 24 tablet, Rfl: 3    FLUoxetine (PROzac) 40 MG capsule, Take 1 capsule by mouth Daily., Disp: 90 capsule, Rfl: 3    levothyroxine (SYNTHROID, LEVOTHROID) 50 MCG tablet, Take 1 tablet by mouth Daily., Disp: 90 tablet, Rfl: 3    lisinopril (PRINIVIL,ZESTRIL) 40 MG tablet, Take 1 tablet by mouth Daily., Disp: 90 tablet, Rfl: 3    meloxicam (MOBIC) 15 MG tablet, Take 1 tablet by mouth Daily with food, Disp: 90 tablet, Rfl: 1    omeprazole (priLOSEC) 20 MG capsule, Take 1 capsule by mouth Daily., Disp: 90 capsule, Rfl: 3    traZODone (DESYREL) 50 MG tablet, Take 1 tablet by mouth Every Night., Disp: 90 tablet, Rfl: 3    Allergies:   No Known Allergies    Objective     Vital Signs  /80 (BP Location: Left arm, Patient Position: Sitting, Cuff Size: Adult)   Pulse 68   Temp 98.7 °F (37.1 °C) (Oral)   Ht 157.5 cm (62.01\")   Wt 84.4 kg (186 lb)   SpO2 98%   BMI 34.01 kg/m²   Estimated body mass index is 34.01 kg/m² as calculated from the following:    Height as of this encounter: 157.5 cm (62.01\").    " Weight as of this encounter: 84.4 kg (186 lb).           Physical Exam  Vitals reviewed.   Constitutional:       Appearance: Normal appearance.   HENT:      Head: Normocephalic and atraumatic.   Eyes:      Pupils: Pupils are equal, round, and reactive to light.   Neck:      Thyroid: No thyroid mass, thyromegaly or thyroid tenderness.   Cardiovascular:      Rate and Rhythm: Normal rate and regular rhythm.      Pulses: Normal pulses.      Heart sounds: Normal heart sounds.   Pulmonary:      Effort: Pulmonary effort is normal.      Breath sounds: Normal breath sounds.   Abdominal:      General: Abdomen is flat. Bowel sounds are normal.   Lymphadenopathy:      Cervical: No cervical adenopathy.   Skin:     General: Skin is warm.   Neurological:      General: No focal deficit present.      Mental Status: She is alert and oriented to person, place, and time.   Psychiatric:         Mood and Affect: Mood normal.            Results:  No results found for this or any previous visit (from the past 24 hours).     Assessment and Plan     Assessment/Plan:  Diagnoses and all orders for this visit:    1. Acquired hypothyroidism (Primary)  Assessment & Plan:  Will evaluate thyroid function today as well as signs of anemia.  If she is continuing to have symptoms recommend endocrinology or follow-up with her PCP.  Recommended low inflammatory diet to help with inflammation.  Recommend gluten-free diet, decreasing processed sugar.  Would also recommend to be Mediterranean diet as well.  Also discussed seeing a dermatologist for hair loss and thinning if thyroid functions within normal limits.    Orders:  -     TSH; Future  -     T4, Free; Future  -     T3; Future    2. Hair loss  -     CBC (No Diff); Future  -     Ferritin; Future        Follow Up  Return if symptoms worsen or fail to improve.    Yaima Albarado PA-C   [facility]

## 2024-11-09 LAB
FERRITIN SERPL-MCNC: 114 NG/ML (ref 13–150)
T3 SERPL-MCNC: 71.5 NG/DL (ref 80–200)
T4 FREE SERPL-MCNC: 1.35 NG/DL (ref 0.92–1.68)
TSH SERPL DL<=0.05 MIU/L-ACNC: 1.37 UIU/ML (ref 0.27–4.2)

## 2024-11-11 ENCOUNTER — OFFICE VISIT (OUTPATIENT)
Dept: SLEEP MEDICINE | Facility: CLINIC | Age: 54
End: 2024-11-11
Payer: COMMERCIAL

## 2024-11-11 ENCOUNTER — PATIENT MESSAGE (OUTPATIENT)
Dept: FAMILY MEDICINE CLINIC | Facility: CLINIC | Age: 54
End: 2024-11-11
Payer: COMMERCIAL

## 2024-11-11 VITALS
HEIGHT: 62 IN | WEIGHT: 182 LBS | DIASTOLIC BLOOD PRESSURE: 82 MMHG | SYSTOLIC BLOOD PRESSURE: 124 MMHG | OXYGEN SATURATION: 96 % | BODY MASS INDEX: 33.49 KG/M2 | TEMPERATURE: 97.8 F | HEART RATE: 66 BPM

## 2024-11-11 DIAGNOSIS — G47.33 OSA (OBSTRUCTIVE SLEEP APNEA): Primary | ICD-10-CM

## 2024-11-11 DIAGNOSIS — E03.9 ACQUIRED HYPOTHYROIDISM: Primary | ICD-10-CM

## 2024-11-11 PROCEDURE — 99213 OFFICE O/P EST LOW 20 MIN: CPT | Performed by: NURSE PRACTITIONER

## 2024-11-11 NOTE — PROGRESS NOTES
"Chief Complaint:   Chief Complaint   Patient presents with    Follow-up    Sleeping Problem       HPI:    Mona Wilkerson is a 54 y.o. female here for follow-up of sleep apnea.  Patient was last seen 4/30/2024.  54year-old female with past medical history of hypothyroidism, GERD, anxiety/depression, chronic smoking who has since quit alcohol use, last drink February 14, 2024..  Patient stated that she felt  fatigued all day long.  She has been told that she snores loudly.  She has not woken herself up snoring or with apneas.  Denied any orthopnea or PND symptoms.  She stated that she is on trazodone and has been for a number of years after her bout with depression where she was admitted in the [place]. .     Patient had sleep study 6/4/2024 which showed an AHI of 18.7.  Patient did initiate CPAP therapy.  Patient states she is doing well\" I can definitely tell I am more alert.\"  Patient continues to become more acclimated each day.  Patient does get 6 to 7 hours of sleep nightly and does take trazodone.  She has an [place] score of 3/24.  Patient is doing well with heated tubing and fullface mask.  She is happy with results and will continue therapy.  Current medications are:   Current Outpatient Medications:     estradiol (Vagifem) 10 MCG tablet vaginal tablet, Insert 1 tablet into the vagina 2 (Two) Times a Week., Disp: 24 tablet, Rfl: 3    FLUoxetine (PROzac) 40 MG capsule, Take 1 capsule by mouth Daily., Disp: 90 capsule, Rfl: 3    levothyroxine (SYNTHROID, LEVOTHROID) 50 MCG tablet, Take 1 tablet by mouth Daily., Disp: 90 tablet, Rfl: 3    lisinopril (PRINIVIL,ZESTRIL) 40 MG tablet, Take 1 tablet by mouth Daily., Disp: 90 tablet, Rfl: 3    meloxicam (MOBIC) 15 MG tablet, Take 1 tablet by mouth Daily with food, Disp: 90 tablet, Rfl: 1    omeprazole (priLOSEC) 20 MG capsule, Take 1 capsule by mouth Daily., Disp: 90 capsule, Rfl: 3    traZODone (DESYREL) 50 MG tablet, Take 1 tablet by mouth Every Night., Disp: 90 " tablet, Rfl: 3.      The patient's relevant past medical, surgical, family and social history were reviewed and updated in Epic as appropriate.       Review of Systems   HENT:  Positive for postnasal drip.    Respiratory:  Positive for apnea.    Gastrointestinal:         Heartburn   Genitourinary:  Positive for urgency.   Musculoskeletal:  Positive for back pain and neck pain.   Allergic/Immunologic: Positive for environmental allergies.   Psychiatric/Behavioral:  Positive for dysphoric mood and sleep disturbance. The patient is nervous/anxious.    All other systems reviewed and are negative.        Objective:    Physical Exam  Constitutional:       Appearance: Normal appearance.   HENT:      Head: Normocephalic and atraumatic.      Mouth/Throat:      Comments: Class 2 airway  Cardiovascular:      Rate and Rhythm: Normal rate and regular rhythm.   Pulmonary:      Effort: Pulmonary effort is normal.      Breath sounds: Normal breath sounds.   Skin:     General: Skin is warm and dry.   Neurological:      Mental Status: She is alert and oriented to person, place, and time.   Psychiatric:         Mood and Affect: Mood normal.         Behavior: Behavior normal.         Thought Content: Thought content normal.         Judgment: Judgment normal.         CPAP Report  71/90 days of use  Greater than 4-hour use 73%  Setting 4-20  95th percentile pressure 16.8  AHI 6.5    The patient continues to use and benefit from CPAP therapy.    ASSESSMENT/PLAN    Diagnoses and all orders for this visit:    1. MAGI (obstructive sleep apnea) (Primary)  -     PAP Therapy          Patient will continue CPAP use and I will see her back in 1 year or sooner as symptoms warrant.    Signed by  MARIA L Nascimento    November 12, 2024      CC: Deepti Glez DO         No ref. provider found

## 2024-11-25 ENCOUNTER — TELEPHONE (OUTPATIENT)
Dept: GASTROENTEROLOGY | Facility: CLINIC | Age: 54
End: 2024-11-25
Payer: COMMERCIAL

## 2024-11-25 NOTE — TELEPHONE ENCOUNTER
"  Name: Mona Wilkerson \"Ramy\"    Relationship: Self    Best Callback Number: [phone]    Patient would like to Schedule a Follow-Up. Unable to schedule within the 3 week timeframe.     Patient is having symptoms of NAUSEA AND DIARRHEA. PT GETS NAUSEOUS AFTER EVERY MEAL. PT REQUESTING APPT.  Please call patient.   "

## 2024-12-03 ENCOUNTER — LAB (OUTPATIENT)
Dept: LAB | Facility: HOSPITAL | Age: 54
End: 2024-12-03
Payer: COMMERCIAL

## 2024-12-03 ENCOUNTER — OFFICE VISIT (OUTPATIENT)
Dept: GASTROENTEROLOGY | Facility: CLINIC | Age: 54
End: 2024-12-03
Payer: COMMERCIAL

## 2024-12-03 VITALS
SYSTOLIC BLOOD PRESSURE: 110 MMHG | DIASTOLIC BLOOD PRESSURE: 70 MMHG | WEIGHT: 184.2 LBS | BODY MASS INDEX: 32.64 KG/M2 | HEART RATE: 70 BPM | HEIGHT: 63 IN

## 2024-12-03 DIAGNOSIS — K58.0 IRRITABLE BOWEL SYNDROME WITH DIARRHEA: ICD-10-CM

## 2024-12-03 DIAGNOSIS — R11.0 NAUSEA: Primary | ICD-10-CM

## 2024-12-03 DIAGNOSIS — E03.9 ACQUIRED HYPOTHYROIDISM: ICD-10-CM

## 2024-12-03 DIAGNOSIS — Z86.0100 HISTORY OF COLONIC POLYPS: ICD-10-CM

## 2024-12-03 DIAGNOSIS — K44.9 HIATAL HERNIA: ICD-10-CM

## 2024-12-03 DIAGNOSIS — D13.1 FUNDIC GLAND POLYPS OF STOMACH, BENIGN: ICD-10-CM

## 2024-12-03 PROBLEM — Z91.09 NICKEL ALLERGY: Status: ACTIVE | Noted: 2024-12-03

## 2024-12-03 LAB — T3 SERPL-MCNC: 87 NG/DL (ref 80–200)

## 2024-12-03 PROCEDURE — 84480 ASSAY TRIIODOTHYRONINE (T3): CPT

## 2024-12-03 PROCEDURE — 99214 OFFICE O/P EST MOD 30 MIN: CPT | Performed by: NURSE PRACTITIONER

## 2024-12-03 RX ORDER — PANTOPRAZOLE SODIUM 40 MG/1
40 TABLET, DELAYED RELEASE ORAL DAILY
Qty: 90 TABLET | Refills: 3 | Status: SHIPPED | OUTPATIENT
Start: 2024-12-03

## 2024-12-03 NOTE — PROGRESS NOTES
GASTROENTEROLOGY OFFICE NOTE    Mona Wilkerson  [id number]  1970    CARE TEAM  Patient Care Team:  Deepti Glez DO as PCP - General (Internal Medicine)  Jen Ramos APRN (Inactive) as Nurse Practitioner (Obstetrics and Gynecology)  Calli Vital APRN as Nurse Practitioner (Hematology and Oncology)    Referring Provider: No ref. 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    Last office visit 1/14/2022 due to nausea, vomiting, diarrhea.  At that visit she reported taking omeprazole 20 mg daily.  She had negative celiac serology after the visit.  She reported drinking 6 beers per day 4 days/week with interest in stopping alcohol.  EGD planned for additional evaluation.  For loose stool,  previously recommended increased fiber intake.     1/10/2024 EGD per Dr. Nice due to nausea, vomiting, anorexia, weight loss revealed irregular Z-line, small hiatal hernia, multiple gastric polyps, gastritis.  It was recommended patient take omeprazole 20 mg twice daily.  Biopsy of duodenum without abnormality.  Gastric biopsy revealed reactive gastropathy and negative for H. pylori.  Gastric polyp fundic gland polyp.  Biopsy of GE junction revealed features suggestive of reflux, mild chronic inflammation.    It was documented on 1/16/2024 that omeprazole would be changed to 40 mg daily.  She requested lower dose of prescription of omeprazole 20 mg daily on 4/9/2024.    She reports nausea after eating as well as up to 3 bowel movements per day with loose stool and soft stool.  She denies abdominal pain.    She has purchased Benefiber but she has not been taking Benefiber regularly.    She has decreased alcohol intake over the past 10 months.    She does not feel as though she gets hungry.  She does not eat much but she  does not feel as though she has experienced weight loss.    She expresses concern that low T3 level could be contributing to symptoms.  She is scheduled for consultation with endocrinology in approximately 3 months.    She also expresses concern that depression could be contributing to symptoms.  She has been to [facility].      Past Medical History:   Diagnosis Date    Abnormal Pap smear of cervix     ADHD     Alcoholism in recovery 03/05/2024    Cervical disc disorder     Colon polyp 2018    Cyst of ovary 11/21/2016    Depression     Depression with anxiety 11/04/2016    seeing [facility] [place]- doing well  off lithium  changed to lamictal getting adhd testing    GERD (gastroesophageal reflux disease)     Hyperlipidemia 11/04/2016 26 Jan 2016  reviewed lipid panel with her     Hypertension 10/07/2022    Hypothyroidism     Insomnia due to other mental disorder 04/30/2021    Low back pain     Lumbar radiculopathy 07/05/2023    Lumbosacral disc disease     Nausea and vomiting 01/14/2022    Primary insomnia 11/21/2016    Sleep difficulties     Stage 3a chronic kidney disease 05/05/2021    Tobacco use 04/30/2021        Past Surgical History:   Procedure Laterality Date    COLONOSCOPY      COLONOSCOPY W/ POLYPECTOMY  10/29/2021    Dr. Nice, diverticulosis and removal of tubular adenoma    ENDOMETRIAL ABLATION W/ NOVASURE      ENDOSCOPY  2018    ESSURE TUBAL LIGATION      EYE SURGERY  2002    Lasik    LASIK Bilateral     LIPOSUCTION      RHINOPLASTY          Current Outpatient Medications on File Prior to Visit   Medication Sig    estradiol (Vagifem) 10 MCG tablet vaginal tablet Insert 1 tablet into the vagina 2 (Two) Times a Week.    FLUoxetine (PROzac) 40 MG capsule Take 1 capsule by mouth Daily.    levothyroxine (SYNTHROID, LEVOTHROID) 50 MCG tablet Take 1 tablet by mouth Daily.    lisinopril (PRINIVIL,ZESTRIL) 40 MG tablet Take 1 tablet by mouth Daily.    meloxicam (MOBIC) 15 MG tablet Take 1 tablet by mouth  Daily with food    omeprazole (priLOSEC) 20 MG capsule Take 1 capsule by mouth Daily.    traZODone (DESYREL) 50 MG tablet Take 1 tablet by mouth Every Night.     No current facility-administered medications on file prior to visit.       No Known Allergies    Family History   Problem Relation Age of Onset    Sleep disorder Mother     Thyroid disease Mother     Breast cancer Mother 51    Hypertension Mother         Essential HTN    Hyperlipidemia Mother     Myelodysplastic syndrome Mother     Cancer Mother         Breast CA, lumpectomy w/ nodes    Hypertension Father         Essential HTN    Hyperlipidemia Father     Myelodysplastic syndrome Father     Multiple myeloma Father     Deep vein thrombosis Father     Alcohol abuse Sister     Hypertension Sister         Essential HTN    Hyperlipidemia Sister     Migraines Sister         Migraine headaches    Sleep disorder Sister     Arthritis Sister         Rheumatoid/Psoriatic?    Hypertension Sister     Hypothyroidism Sister     Hyperlipidemia Sister     Thyroid disease Sister     Diabetes Paternal Aunt     Breast cancer Maternal Grandmother 49    Breast cancer Other 51    Breast cancer Other 56    Ovarian cancer Neg Hx     Colon cancer Neg Hx     Liver cancer Neg Hx     Rectal cancer Neg Hx     Stomach cancer Neg Hx        Social History     Socioeconomic History    Marital status:    Tobacco Use    Smoking status: Former     Current packs/day: 0.00     Average packs/day: 0.5 packs/day for 30.0 years (15.0 ttl pk-yrs)     Types: Cigarettes     Start date: 1992     Quit date: 2022     Years since quittin.6    Smokeless tobacco: Never    Tobacco comments:     last cigarette 4/15/2022   Vaping Use    Vaping status: Never Used   Substance and Sexual Activity    Alcohol use: Not Currently     Alcohol/week: 6.0 standard drinks of alcohol     Types: 6 Cans of beer per week     Comment: quit 24    Drug use: No    Sexual activity: Not Currently      "Partners: Male     Birth control/protection: Essure     Comment: Essure       PHYSICAL EXAM   /70 (BP Location: Right arm, Patient Position: Sitting, Cuff Size: Adult)   Pulse 70   Ht 160 cm (63\")   Wt 83.6 kg (184 lb 3.2 oz)   BMI 32.63 kg/m²   Physical Exam  Constitutional:       General: She is not in acute distress.     Appearance: She is not toxic-appearing.   HENT:      Head: Normocephalic and atraumatic. No contusion.      Right Ear: External ear normal.      Left Ear: External ear normal.   Eyes:      General: Lids are normal. No scleral icterus.        Right eye: No discharge.         Left eye: No discharge.      Extraocular Movements: Extraocular movements intact.   Neck:      Trachea: Trachea normal.      Comments: No visible mass  No visible adenopathy  Cardiovascular:      Rate and Rhythm: Normal rate.   Pulmonary:      Effort: No respiratory distress.      Comments: Symmetrical expansion    Abdominal:      Palpations: Abdomen is soft. There is no mass.      Tenderness: There is no abdominal tenderness.   Musculoskeletal:      Comments: Symmetrical movement of upper extremities  Symmetrical movement of lower extremities  No visible deformities   Skin:     General: Skin is warm and dry.      Coloration: Skin is not jaundiced.   Neurological:      General: No focal deficit present.      Mental Status: She is alert.   Psychiatric:         Mood and Affect: Mood normal.         Behavior: Behavior normal.         Thought Content: Thought content normal.     Results Review:  10/29/2021 colonoscopy per Dr. Nice that revealed 1 4 mm polyp in the transverse colon resected and retrieved and diverticulosis in the sigmoid colon with recommendation to repeat colonoscopy in 5 years for surveillance. Review of pathology report revealed transverse colon polyp that was tubular adenoma     1/10/2024 EGD per Dr. Nice due to nausea, vomiting, anorexia, weight loss revealed irregular Z-line, small hiatal hernia, " multiple gastric polyps, gastritis.  It was recommended patient take omeprazole 20 mg twice daily.  Biopsy of duodenum without abnormality.  Gastric biopsy revealed reactive gastropathy and negative for H. pylori.  Gastric polyp fundic gland polyp.  Biopsy of GE junction revealed features suggestive of reflux, mild chronic inflammation.    8/8/2024 ultrasound liver without abnormality.  CBC          3/5/2024    14:02 11/8/2024    16:26   CBC   WBC 4.82  7.66    RBC 4.52  4.36    Hemoglobin 14.4  13.2    Hematocrit 42.4  38.9    MCV 93.8  89.2    MCH 31.9  30.3    MCHC 34.0  33.9    RDW 12.0  12.7    Platelets 299  285          ASSESSMENT / PLAN  1. Nausea  2. Hiatal hernia  -Recommend she stop omeprazole and start pantoprazole 40 mg daily in the event nausea is due to reflux  -She has decreased use of alcohol which should be helpful for symptoms  -Plan for HIDA scan for additional evaluation  -In the event hunger could be contributing to nausea recommend she try to eat 30 g of protein 3 times per day  - pantoprazole (PROTONIX) 40 MG EC tablet; Take 1 tablet by mouth Daily. Take 30 to 60 minutes prior to a meal  Dispense: 90 tablet; Refill: 3  - NM HIDA Scan With Pharmacological Intervention; Future  3. Irritable bowel syndrome with diarrhea  -Recommend Xifaxan 3 times daily for 14 days.  She previously had negative celiac serology and biopsies of duodenum at time of EGD without findings suggestive of celiac.  -After she completes 14-day course of Xifaxan recommend she start Benefiber daily  from other medications by 2 hours on both sides  - riFAXIMin (XIFAXAN) 550 MG tablet; Take 1 tablet by mouth 3 (Three) Times a Day.  Dispense: 42 tablet; Refill: 0    4. Fundic gland polyps of stomach, benign  - likely from PPI use, no further workup recommended  5. History of colonic polyps  -due for surveillance colonoscopy 10/2026     Recommend she continue to look for a counselor that she feels comfortable talking  to, consider checking with insurance if a counselor is covered outside of [facility].  Return in about 3 months (around 3/3/2025).    Nelly Tejada, APRN  12/03/2024

## 2025-02-21 DIAGNOSIS — E03.9 ACQUIRED HYPOTHYROIDISM: Chronic | ICD-10-CM

## 2025-02-21 RX ORDER — LEVOTHYROXINE SODIUM 50 UG/1
50 TABLET ORAL DAILY
Qty: 90 TABLET | Refills: 0 | Status: SHIPPED | OUTPATIENT
Start: 2025-02-21

## 2025-03-21 ENCOUNTER — OFFICE VISIT (OUTPATIENT)
Dept: FAMILY MEDICINE CLINIC | Facility: CLINIC | Age: 55
End: 2025-03-21
Payer: COMMERCIAL

## 2025-03-21 ENCOUNTER — LAB (OUTPATIENT)
Dept: LAB | Facility: HOSPITAL | Age: 55
End: 2025-03-21
Payer: COMMERCIAL

## 2025-03-21 VITALS
WEIGHT: 181 LBS | SYSTOLIC BLOOD PRESSURE: 114 MMHG | OXYGEN SATURATION: 97 % | BODY MASS INDEX: 32.07 KG/M2 | HEIGHT: 63 IN | DIASTOLIC BLOOD PRESSURE: 72 MMHG | HEART RATE: 70 BPM

## 2025-03-21 DIAGNOSIS — F99 INSOMNIA DUE TO OTHER MENTAL DISORDER: ICD-10-CM

## 2025-03-21 DIAGNOSIS — Z00.00 ANNUAL PHYSICAL EXAM: Primary | ICD-10-CM

## 2025-03-21 DIAGNOSIS — E55.9 VITAMIN D DEFICIENCY: ICD-10-CM

## 2025-03-21 DIAGNOSIS — E66.811 CLASS 1 OBESITY DUE TO EXCESS CALORIES WITH SERIOUS COMORBIDITY AND BODY MASS INDEX (BMI) OF 30.0 TO 30.9 IN ADULT: ICD-10-CM

## 2025-03-21 DIAGNOSIS — Z87.891 PERSONAL HISTORY OF NICOTINE DEPENDENCE: ICD-10-CM

## 2025-03-21 DIAGNOSIS — K21.9 GERD WITHOUT ESOPHAGITIS: ICD-10-CM

## 2025-03-21 DIAGNOSIS — F51.05 INSOMNIA DUE TO OTHER MENTAL DISORDER: ICD-10-CM

## 2025-03-21 DIAGNOSIS — Z12.2 SCREENING FOR LUNG CANCER: ICD-10-CM

## 2025-03-21 DIAGNOSIS — I10 PRIMARY HYPERTENSION: ICD-10-CM

## 2025-03-21 DIAGNOSIS — N95.2 VAGINAL ATROPHY: ICD-10-CM

## 2025-03-21 DIAGNOSIS — F33.41 RECURRENT MAJOR DEPRESSIVE DISORDER, IN PARTIAL REMISSION: ICD-10-CM

## 2025-03-21 DIAGNOSIS — Z80.3 FAMILY HISTORY OF BREAST CANCER: ICD-10-CM

## 2025-03-21 DIAGNOSIS — Z91.89 AT HIGH RISK FOR BREAST CANCER: ICD-10-CM

## 2025-03-21 DIAGNOSIS — Z23 IMMUNIZATION DUE: ICD-10-CM

## 2025-03-21 DIAGNOSIS — F41.9 ANXIETY: ICD-10-CM

## 2025-03-21 DIAGNOSIS — E78.2 MIXED HYPERLIPIDEMIA: ICD-10-CM

## 2025-03-21 DIAGNOSIS — Z01.419 ROUTINE GYNECOLOGICAL EXAMINATION: ICD-10-CM

## 2025-03-21 DIAGNOSIS — E03.9 ACQUIRED HYPOTHYROIDISM: ICD-10-CM

## 2025-03-21 DIAGNOSIS — Z00.00 ANNUAL PHYSICAL EXAM: ICD-10-CM

## 2025-03-21 DIAGNOSIS — E66.09 CLASS 1 OBESITY DUE TO EXCESS CALORIES WITH SERIOUS COMORBIDITY AND BODY MASS INDEX (BMI) OF 30.0 TO 30.9 IN ADULT: ICD-10-CM

## 2025-03-21 DIAGNOSIS — J34.89 NASAL SORE: ICD-10-CM

## 2025-03-21 LAB
25(OH)D3 SERPL-MCNC: 85.9 NG/ML (ref 30–100)
ALBUMIN SERPL-MCNC: 4.3 G/DL (ref 3.5–5.2)
ALBUMIN/GLOB SERPL: 1.5 G/DL
ALP SERPL-CCNC: 105 U/L (ref 39–117)
ALT SERPL W P-5'-P-CCNC: 17 U/L (ref 1–33)
ANION GAP SERPL CALCULATED.3IONS-SCNC: 12 MMOL/L (ref 5–15)
AST SERPL-CCNC: 22 U/L (ref 1–32)
BILIRUB SERPL-MCNC: 0.3 MG/DL (ref 0–1.2)
BUN SERPL-MCNC: 12 MG/DL (ref 6–20)
BUN/CREAT SERPL: 10.3 (ref 7–25)
CALCIUM SPEC-SCNC: 9.6 MG/DL (ref 8.6–10.5)
CHLORIDE SERPL-SCNC: 101 MMOL/L (ref 98–107)
CHOLEST SERPL-MCNC: 270 MG/DL (ref 0–200)
CO2 SERPL-SCNC: 25 MMOL/L (ref 22–29)
CREAT SERPL-MCNC: 1.16 MG/DL (ref 0.57–1)
DEPRECATED RDW RBC AUTO: 43.2 FL (ref 37–54)
EGFRCR SERPLBLD CKD-EPI 2021: 55.4 ML/MIN/1.73
ERYTHROCYTE [DISTWIDTH] IN BLOOD BY AUTOMATED COUNT: 13.2 % (ref 12.3–15.4)
GLOBULIN UR ELPH-MCNC: 2.9 GM/DL
GLUCOSE SERPL-MCNC: 87 MG/DL (ref 65–99)
HBA1C MFR BLD: 5.7 % (ref 4.8–5.6)
HCT VFR BLD AUTO: 38.1 % (ref 34–46.6)
HDLC SERPL-MCNC: 88 MG/DL (ref 40–60)
HGB BLD-MCNC: 12.6 G/DL (ref 12–15.9)
LDLC SERPL CALC-MCNC: 167 MG/DL (ref 0–100)
LDLC/HDLC SERPL: 1.86 {RATIO}
MCH RBC QN AUTO: 29.9 PG (ref 26.6–33)
MCHC RBC AUTO-ENTMCNC: 33.1 G/DL (ref 31.5–35.7)
MCV RBC AUTO: 90.5 FL (ref 79–97)
PLATELET # BLD AUTO: 258 10*3/MM3 (ref 140–450)
PMV BLD AUTO: 10.3 FL (ref 6–12)
POTASSIUM SERPL-SCNC: 4.5 MMOL/L (ref 3.5–5.2)
PROT SERPL-MCNC: 7.2 G/DL (ref 6–8.5)
RBC # BLD AUTO: 4.21 10*6/MM3 (ref 3.77–5.28)
SODIUM SERPL-SCNC: 138 MMOL/L (ref 136–145)
TRIGL SERPL-MCNC: 91 MG/DL (ref 0–150)
TSH SERPL DL<=0.05 MIU/L-ACNC: 1.03 UIU/ML (ref 0.27–4.2)
VIT B12 BLD-MCNC: 495 PG/ML (ref 211–946)
VLDLC SERPL-MCNC: 15 MG/DL (ref 5–40)
WBC NRBC COR # BLD AUTO: 4.56 10*3/MM3 (ref 3.4–10.8)

## 2025-03-21 PROCEDURE — 80050 GENERAL HEALTH PANEL: CPT

## 2025-03-21 PROCEDURE — 82607 VITAMIN B-12: CPT

## 2025-03-21 PROCEDURE — 82306 VITAMIN D 25 HYDROXY: CPT

## 2025-03-21 PROCEDURE — 80061 LIPID PANEL: CPT

## 2025-03-21 PROCEDURE — 83036 HEMOGLOBIN GLYCOSYLATED A1C: CPT

## 2025-03-21 RX ORDER — LISINOPRIL 40 MG/1
40 TABLET ORAL DAILY
Qty: 90 TABLET | Refills: 3 | Status: SHIPPED | OUTPATIENT
Start: 2025-03-21

## 2025-03-21 RX ORDER — FLUOXETINE HYDROCHLORIDE 40 MG/1
40 CAPSULE ORAL DAILY
Qty: 90 CAPSULE | Refills: 3 | Status: SHIPPED | OUTPATIENT
Start: 2025-03-21

## 2025-03-21 RX ORDER — LEVOTHYROXINE SODIUM 50 UG/1
50 TABLET ORAL DAILY
Qty: 90 TABLET | Refills: 0 | Status: SHIPPED | OUTPATIENT
Start: 2025-03-21

## 2025-03-21 RX ORDER — TRAZODONE HYDROCHLORIDE 50 MG/1
50 TABLET ORAL NIGHTLY
Qty: 90 TABLET | Refills: 3 | Status: SHIPPED | OUTPATIENT
Start: 2025-03-21

## 2025-03-21 NOTE — LETTER
[facility]  Vaccine Consent Form    Patient Name:  Mona Wilkerson  Patient :  1970     Vaccine(s) Ordered    Pneumococcal Conjugate Vaccine 20-Valent (PCV20)        Screening Checklist  The following questions should be completed prior to vaccination. If you answer “yes” to any question, it does not necessarily mean you should not be vaccinated. It just means we may need to clarify or ask more questions. If a question is unclear, please ask your healthcare provider to explain it.    Yes No   Any fever or moderate to severe illness today (mild illness and/or antibiotic treatment are not contraindications)?     Do you have a history of a serious reaction to any previous vaccinations, such as anaphylaxis, encephalopathy within 7 days, Guillain-[place] syndrome within 6 weeks, seizure?     Have you received any live vaccine(s) (e.g MMR, GUERO) or any other vaccines in the last month (to ensure duplicate doses aren't given)?     Do you have an anaphylactic allergy to latex (DTaP, DTaP-IPV, Hep A, Hep B, MenB, RV, Td, Tdap), baker’s yeast (Hep B, HPV), polysorbates (RSV, nirsevimab, PCV 20, Rotavirrus, Tdap, Shingrix), or gelatin (GUERO, MMR)?     Do you have an anaphylactic allergy to neomycin (Rabies, GUERO, MMR, IPV, Hep A), polymyxin B (IPV), or streptomycin (IPV)?      Any cancer, leukemia, AIDS, or other immune system disorder? (GUERO, MMR, RV)     Do you have a parent, brother, or sister with an immune system problem (if immune competence of vaccine recipient clinically verified, can proceed)? (MMR, GUERO)     Any recent steroid treatments for >2 weeks, chemotherapy, or radiation treatment? (GUERO, MMR)     Have you received antibody-containing blood transfusions or IVIG in the past 11 months (recommended interval is dependent on product)? (MMR, GUERO)     Have you taken antiviral drugs (acyclovir, famciclovir, valacyclovir for GUERO) in the last 24 or 48 hours, respectively?      Are you pregnant or planning to  "become pregnant within 1 month? (GUERO, MMR, HPV, IPV, MenB, Abrexvy; For Hep B- refer to Engerix-B; For RSV - Abrysvo is indicated for 32-36 weeks of pregnancy from September to January)     For infants, have you ever been told your child has had intussusception or a medical emergency involving obstruction of the intestine (Rotavirus)? If not for an infant, can skip this question.         *Ordering Physicians/APC should be consulted if \"yes\" is checked by the patient or guardian above.  I have received, read, and understand the Vaccine Information Statement (VIS) for each vaccine ordered.  I have considered my or my child's health status as well as the health status of my close contacts.  I have taken the opportunity to discuss my vaccine questions with my or my child's health care provider.   I have requested that the ordered vaccine(s) be given to me or my child.  I understand the benefits and risks of the vaccines.  I understand that I should remain in the clinic for 15 minutes after receiving the vaccine(s).  _________________________________________________________  Signature of Patient or Parent/Legal Guardian ____________________  Date     "

## 2025-03-21 NOTE — PROGRESS NOTES
Physical Exam     Patient Name: Mona Wilkerson  : 1970   MRN: [id number]   Care Team: Patient Care Team:  Deepti Glez DO as PCP - General (Internal Medicine)  Jen Ramos APRN (Inactive) as Nurse Practitioner (Obstetrics and Gynecology)  Calli Vital APRN as Nurse Practitioner (Hematology and Oncology)    Chief Complaint:    Chief Complaint   Patient presents with    Annual Exam     Med refills.        History of Present Illness: Mona Wilkerson is a 55 y.o. female who is presents today for annual healthcare maintenance.  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 Overall feeling well today.      Depression: PHQ-2 Depression Screening  PHQ-2 Depression Screening  Little interest or pleasure in doing things? Not at all   Feeling down, depressed, or hopeless? Not at all   PHQ-2 Total Score 0     Depression, anxiety - following with behavioral health. Managed with prozac 40mg daily.     She stopped drinking alcohol 2024      HTN - controlled with lisinopril 40mg daily     Hypothyroid - stable with synthroid     GERD - compliant with PPI     Vaginal atrophy - well controlled with estradiol tablet twice weekly      Former smoker history >20 years, quit in 2023    High risk history - Maternal grandmother and mother had breast cancer (age 51)       Health Maintenance   Topic Date Due    Annual Breast MRI  Never done    COVID-19 Vaccine ( season) 2024    LIPID PANEL  2025    BMI FOLLOWUP  2025    MAMMOGRAM TCS >= 20  2025    LUNG CANCER SCREENING  2025    PAP SMEAR  2025    TDAP/TD VACCINES (2 - Td or Tdap) 2025    ANNUAL PHYSICAL  2026    COLORECTAL CANCER SCREENING  2026    HEPATITIS C SCREENING  Completed    INFLUENZA VACCINE  Completed    Pneumococcal Vaccine 50+  Completed    ZOSTER VACCINE  Completed    MAMMOGRAM  Discontinued       Subjective      Review of Systems:   Review of Systems - See  HPI    Past Medical History:   Past Medical History:   Diagnosis Date    Abnormal Pap smear of cervix     ADHD     Alcoholism in recovery 03/05/2024    Cervical disc disorder     Colon polyp 2018    Cyst of ovary 11/21/2016    Depression     Depression with anxiety 11/04/2016    seeing [facility]- doing well  off lithium  changed to lamictal getting adhd testing    GERD (gastroesophageal reflux disease)     Hyperlipidemia 11/04/2016 26 Jan 2016  reviewed lipid panel with her     Hypertension 10/07/2022    Hypothyroidism     Insomnia due to other mental disorder 04/30/2021    Low back pain     Lumbar radiculopathy 07/05/2023    Lumbosacral disc disease     Nausea and vomiting 01/14/2022    Primary insomnia 11/21/2016    Sleep difficulties     Stage 3a chronic kidney disease 05/05/2021    Tobacco use 04/30/2021    Vitamin D deficiency 2012       Past Surgical History:   Past Surgical History:   Procedure Laterality Date    COLONOSCOPY      COLONOSCOPY W/ POLYPECTOMY  10/29/2021    Dr. Nice, diverticulosis and removal of tubular adenoma    ENDOMETRIAL ABLATION W/ NOVASURE      ENDOSCOPY  2018    ESSURE TUBAL LIGATION      EYE SURGERY  2002    Lasik    LASIK Bilateral     LIPOSUCTION      RHINOPLASTY         Family History:   Family History   Problem Relation Age of Onset    Sleep disorder Mother     Thyroid disease Mother     Breast cancer Mother 51    Hypertension Mother         Essential HTN    Hyperlipidemia Mother     Myelodysplastic syndrome Mother     Cancer Mother         Breast CA, lumpectomy w/ nodes 1995 & Myelodysplastic Syndrome w/ 5q deletion 9/2020    Hypertension Father         Essential HTN    Hyperlipidemia Father     Myelodysplastic syndrome Father     Multiple myeloma Father     Deep vein thrombosis Father     Alcohol abuse Sister     Hypertension Sister         Essential HTN    Hyperlipidemia Sister     Migraines Sister         Migraine headaches    Sleep disorder Sister     Arthritis Sister          Rheumatoid/Psoriatic?    Hypertension Sister     Hypothyroidism Sister     Hyperlipidemia Sister     Thyroid disease Sister     Diabetes Paternal Aunt     Breast cancer Maternal Grandmother 49    Breast cancer Other 51    Breast cancer Other 56    Ovarian cancer Neg Hx     Colon cancer Neg Hx     Liver cancer Neg Hx     Rectal cancer Neg Hx     Stomach cancer Neg Hx        Social History:   Social History     Socioeconomic History    Marital status:    Tobacco Use    Smoking status: Former     Current packs/day: 0.00     Average packs/day: 0.5 packs/day for 30.0 years (15.0 ttl pk-yrs)     Types: Cigarettes     Start date: 1992     Quit date: 2022     Years since quittin.9     Passive exposure: Past    Smokeless tobacco: Never    Tobacco comments:     last cigarette 4/15/2022   Vaping Use    Vaping status: Never Used   Substance and Sexual Activity    Alcohol use: Not Currently     Alcohol/week: 6.0 standard drinks of alcohol     Comment: quit 23    Drug use: No    Sexual activity: Not Currently     Partners: Male     Birth control/protection: Essure     Comment: Essure       Tobacco History:   Social History     Tobacco Use   Smoking Status Former    Current packs/day: 0.00    Average packs/day: 0.5 packs/day for 30.0 years (15.0 ttl pk-yrs)    Types: Cigarettes    Start date: 1992    Quit date: 2022    Years since quittin.9    Passive exposure: Past   Smokeless Tobacco Never   Tobacco Comments    last cigarette 4/15/2022       Medications:     Current Outpatient Medications:     estradiol (Vagifem) 10 MCG tablet vaginal tablet, Insert 1 tablet into the vagina 2 (Two) Times a Week., Disp: 24 tablet, Rfl: 3    FLUoxetine (PROzac) 40 MG capsule, Take 1 capsule by mouth Daily., Disp: 90 capsule, Rfl: 3    levothyroxine (SYNTHROID, LEVOTHROID) 50 MCG tablet, Take 1 tablet by mouth Daily., Disp: 90 tablet, Rfl: 0    lisinopril (PRINIVIL,ZESTRIL) 40 MG tablet, Take 1 tablet by  "mouth Daily., Disp: 90 tablet, Rfl: 3    meloxicam (MOBIC) 15 MG tablet, Take 1 tablet by mouth Daily with food, Disp: 90 tablet, Rfl: 1    pantoprazole (PROTONIX) 40 MG EC tablet, Take 1 tablet by mouth Daily. Take 30 to 60 minutes prior to a meal, Disp: 90 tablet, Rfl: 3    traZODone (DESYREL) 50 MG tablet, Take 1 tablet by mouth Every Night., Disp: 90 tablet, Rfl: 3    Allergies:   No Known Allergies    Past Medical History, Social History, Family History and Care Team were all reviewed with patient and updated as appropriate.       Objective     Physical Exam:  Vital Signs:   Vitals:    03/21/25 1001   BP: 114/72   Pulse: 70   SpO2: 97%   Weight: 82.1 kg (181 lb)   Height: 160 cm (62.99\")     Body mass index is 32.07 kg/m².     Physical Exam  Vitals reviewed.   Constitutional:       Appearance: Normal appearance.   HENT:      Nose:      Comments: Erythematous patch in left medial nares  Cardiovascular:      Rate and Rhythm: Normal rate and regular rhythm.      Heart sounds: Normal heart sounds. No murmur heard.  Pulmonary:      Effort: Pulmonary effort is normal. No respiratory distress.      Breath sounds: Normal breath sounds. No wheezing.   Abdominal:      General: Abdomen is flat. There is no distension.      Palpations: Abdomen is soft.      Tenderness: There is no abdominal tenderness.   Skin:     General: Skin is warm and dry.   Neurological:      Mental Status: She is alert.   Psychiatric:         Mood and Affect: Mood normal.         Behavior: Behavior normal.         Judgment: Judgment normal.         Assessment / Plan      Assessment/Plan:   Problems Addressed This Visit  Diagnoses and all orders for this visit:    1. Annual physical exam (Primary)  -     CBC (No Diff); Future  -     Lipid Panel; Future  -     Hemoglobin A1c; Future  -     Comprehensive Metabolic Panel; Future  -     TSH; Future  -     Vitamin D,25-Hydroxy; Future  -     Vitamin B12; Future    2. Screening for lung cancer  -      CT " Chest Low Dose Cancer Screening WO; Future    3. Personal history of nicotine dependence  -      CT Chest Low Dose Cancer Screening WO; Future    4. Mixed hyperlipidemia  -     Lipid Panel; Future    5. Primary hypertension  -     CBC (No Diff); Future  -     Comprehensive Metabolic Panel; Future  -     TSH; Future  -     Vitamin D,25-Hydroxy; Future  -     Vitamin B12; Future  -     lisinopril (PRINIVIL,ZESTRIL) 40 MG tablet; Take 1 tablet by mouth Daily.  Dispense: 90 tablet; Refill: 3    6. GERD without esophagitis    7. Vitamin D deficiency  -     Vitamin D,25-Hydroxy; Future    8. Acquired hypothyroidism  -     Comprehensive Metabolic Panel; Future  -     TSH; Future  -     levothyroxine (SYNTHROID, LEVOTHROID) 50 MCG tablet; Take 1 tablet by mouth Daily.  Dispense: 90 tablet; Refill: 0    9. Class 1 obesity due to excess calories with serious comorbidity and body mass index (BMI) of 30.0 to 30.9 in adult  -     Lipid Panel; Future  -     Hemoglobin A1c; Future  -     Comprehensive Metabolic Panel; Future  -     TSH; Future  -     Vitamin D,25-Hydroxy; Future  -     Vitamin B12; Future    10. At high risk for breast cancer  -     Ambulatory Referral to Genetic Counseling/Testing    11. Routine gynecological examination  -     Ambulatory Referral to Gynecology    12. Vaginal atrophy  -     Ambulatory Referral to Gynecology    13. Immunization due  -     Pneumococcal Conjugate Vaccine 20-Valent (PCV20)    14. Family history of breast cancer  -     Ambulatory Referral to Genetic Counseling/Testing    15. Insomnia due to other mental disorder  -     FLUoxetine (PROzac) 40 MG capsule; Take 1 capsule by mouth Daily.  Dispense: 90 capsule; Refill: 3  -     traZODone (DESYREL) 50 MG tablet; Take 1 tablet by mouth Every Night.  Dispense: 90 tablet; Refill: 3    16. Recurrent major depressive disorder, in partial remission  -     FLUoxetine (PROzac) 40 MG capsule; Take 1 capsule by mouth Daily.  Dispense: 90 capsule;  Refill: 3    17. Anxiety  -     FLUoxetine (PROzac) 40 MG capsule; Take 1 capsule by mouth Daily.  Dispense: 90 capsule; Refill: 3    18. Nasal sore  -     Ambulatory Referral to ENT (Otolaryngology)        Healthcare Maintenance   Vaccines:  Covid booster encouraged   PCV20 today    Cancer Screening  - Pap smear: 9/2022 neg cytology, neg HPV. Planning to establish with GYN for repeat, referred today   -Breast cancer screening - high risk Tyrer Cuzick score:  25.2% lifetime risk  - referred to genetics. Due for annual MRI, followed by mammogram 6 months later. These are ordered and she will call to schedule.   - Colonoscopy: 11/2021, repeat 5y  - Lung cancer screening: due 8/2025, ordered     Other  -PHQ score 0  -Counseled on importance of maintaining healthy diet and routine exercise. Encouraged 150 min exercise per week.  -Tobacco cessation: former smoker, 20 pack years, quit in 2022  -Blood pressure is at goal <130/80  -Metabolic screening today     Encouraged routine eye and dental exams.     Obesity - Counseled patient on importance of weight loss and maintaining healthy lifestyle. Recommended calorie deficit and 150 minutes of exercise per week.    Hypothyroidism - compliant with synthroid daily     Nasal sore - recurrent, left medial sore in nares coming and going x 10 years. Will refer to ENT     Plan of care reviewed with patient at the conclusion of today's visit. Education was provided regarding diagnosis and management.  Patient verbalizes understanding of and agreement with management plan.    Follow Up:   Return in about 6 months (around 9/21/2025) for Recheck.        DO SUNITA Holland RD  [facility]  [address]  [facility] [address]  Fax [phone]  Phone [phone]

## 2025-04-21 ENCOUNTER — LAB (OUTPATIENT)
Dept: LAB | Facility: HOSPITAL | Age: 55
End: 2025-04-21
Payer: COMMERCIAL

## 2025-04-21 DIAGNOSIS — N17.9 AKI (ACUTE KIDNEY INJURY): ICD-10-CM

## 2025-04-21 LAB
ANION GAP SERPL CALCULATED.3IONS-SCNC: 10.8 MMOL/L (ref 5–15)
BUN SERPL-MCNC: 11 MG/DL (ref 6–20)
BUN/CREAT SERPL: 10.8 (ref 7–25)
CALCIUM SPEC-SCNC: 9.5 MG/DL (ref 8.6–10.5)
CHLORIDE SERPL-SCNC: 101 MMOL/L (ref 98–107)
CO2 SERPL-SCNC: 26.2 MMOL/L (ref 22–29)
CREAT SERPL-MCNC: 1.02 MG/DL (ref 0.57–1)
EGFRCR SERPLBLD CKD-EPI 2021: 64.7 ML/MIN/1.73
GLUCOSE SERPL-MCNC: 92 MG/DL (ref 65–99)
POTASSIUM SERPL-SCNC: 3.9 MMOL/L (ref 3.5–5.2)
SODIUM SERPL-SCNC: 138 MMOL/L (ref 136–145)

## 2025-04-21 PROCEDURE — 80048 BASIC METABOLIC PNL TOTAL CA: CPT

## 2025-04-22 ENCOUNTER — RESULTS FOLLOW-UP (OUTPATIENT)
Dept: FAMILY MEDICINE CLINIC | Facility: CLINIC | Age: 55
End: 2025-04-22
Payer: COMMERCIAL

## 2025-04-22 DIAGNOSIS — F41.9 ANXIETY: Primary | ICD-10-CM

## 2025-04-22 DIAGNOSIS — F33.41 RECURRENT MAJOR DEPRESSIVE DISORDER, IN PARTIAL REMISSION: ICD-10-CM

## 2025-05-19 ENCOUNTER — OFFICE VISIT (OUTPATIENT)
Dept: ORTHOPEDIC SURGERY | Facility: CLINIC | Age: 55
End: 2025-05-19
Payer: COMMERCIAL

## 2025-05-19 VITALS
BODY MASS INDEX: 31.93 KG/M2 | WEIGHT: 180.2 LBS | DIASTOLIC BLOOD PRESSURE: 70 MMHG | SYSTOLIC BLOOD PRESSURE: 114 MMHG | HEIGHT: 63 IN

## 2025-05-19 DIAGNOSIS — G56.03 BILATERAL CARPAL TUNNEL SYNDROME: Primary | ICD-10-CM

## 2025-05-19 NOTE — PROGRESS NOTES
"                                                                 [facility]     Office Visit       Date: 05/19/2025   Patient Name: Mona Wilkerson  MRN: [id number]  YOB: 1970    Referring Physician: No ref. provider found     Chief Complaint:   Chief Complaint   Patient presents with    Left Wrist - Pain, Initial Evaluation    Right Wrist - Pain, Initial Evaluation     History of Present Illness:   Mona Wilkerson is a 55 y.o. female right-hand-dominant send to clinic as new patient with a bilateral hand numbness and tingling.  She has had symptoms since 2012.  She has been treated in the past by the provider for bilateral carpal tunnel syndrome and has had several corticosteroid injections.  Her last EMG was approximately 3 years ago where she was told she had moderate left and mild right carpal tunnel syndrome.  Her last injection was over 6 months ago.  She has been intermittently wearing her braces at night.  She denies any specific pain but endorses numbness and tingling mostly to the thumb index and long fingers.  No other complaints or concerns.    PMH: Diabetes, hyperlipidemia, GERD, anxiety, vitamin D deficiency, depression, EtOH abuse.    Subjective   Review of Systems:   Review of Systems   Pertinent review of systems per HPI    I reviewed the patient's chief complaint, history of present illness, review of systems, past medical history, surgical history, family history, social history, medications and allergy list in the EMR on 05/19/2025 and agree with the findings above.    Objective    Vital Signs:   Vitals:    05/19/25 1314   BP: 114/70   Weight: 81.7 kg (180 lb 3.2 oz)   Height: 160 cm (62.99\")     General: No acute distress. Alert and oriented.   Cardiovascular: Palpable radial pulse.   Respiratory: Breathing is nonlabored.   Ortho Exam:  Examination of the bilateral Upper Extremity:  No skin lesions or ecchymosis. No edema.    Interosseous wasting is not " visualized   Thenar atrophy is not visualized     Hypothenar atrophy is not visualized     negative Tinel's at the carpal tunnel, positive Durkan's compression test, positive Phalen's maneuver   negative Tinel's over Guyon's canal   negative Tinel's at the ulnar nerve at the elbow   negative Elbow Flexion Test   negative Subluxation of the ulnar nerve   negative tenderness with deep palpation of the pronator   negative Tinel's with median nerve at pronator   5/5 APB strength   5/5 FDI strength   Sensation is decreased to light touch over the nerve distribution   Digits warm and well-perfused       Imaging / Studies:    Imaging Results (Last 24 Hours)       ** No results found for the last 24 hours. **        Nerve conduction study provided via patient to report: Moderate left and mild right carpal tunnel syndrome    Assessment / Plan    Assessment/Plan:   Mona Wilkerson is a 55 y.o. female with bilateral carpal tunnel syndrome    I discussed with the patient their clinical and electrodiagnostic findings demonstrate carpal tunnel syndrome.  The pathophysiology of the condition, including compression of the median nerve in the carpal tunnel, was explained in detail.  It was also discussed that with more severe symptomatology, such as persistent and worsening paresthesias, that permanent nerve damage may result, which may make improvement after surgery less predictable.  Both conservative and surgical options were discussed.  Conservative treatments in the form of: observation, gentle nerve gliding exercises, night time splinting, and injection were presented.  Operative treatment in the form of open carpal tunnel release was presented and reiterated that the goal of surgery is to prevent further compression and damage to the nerve.  Patient expressed understanding of all of her options and she is most interested in definitive treatment.  We discussed the risk, benefits, terms and prognosis, she elects to proceed  with left carpal tunnel release.    The risks and benefits of the procedure were discussed with the patient and/or appropriate guardian, which include but are not limited to: the risk of bleeding, pain, infection, wound complications, neurovascular damage, post-operative stiffness, tendon and/or ligament injury, persistent pain, need for additional surgeries in the future, and general risks from anesthesia. Carpal tunnel risks: Specific risks include: persistent numbness and tingling as the goal of surgery is to prevent further worsening of symptoms, damage to the median nerve and its branches, persistent weakness and pillar pain. We also discussed the post-operative rehabilitation, length of immobilization, the need for therapy, and the overall expected outcomes from the procedure. Proper time was given to answer the patient's questions regarding the procedure. The patient expressed understanding. Knowing what the risks are and what the conservative treatment is, the patient elected to forgo any further conservative treatment options and proceed with the surgical intervention. Surgical consent was obtained in the clinic and signed by myself and the patient. They will follow up with me postoperatively.        ICD-10-CM ICD-9-CM   1. Bilateral carpal tunnel syndrome  G56.03 354.0     Follow Up:   Return for Follow Up- After surgery.      Muna Daniel MD  [facility] Orthopedic & Hand Surgeon

## 2025-06-12 ENCOUNTER — OFFICE VISIT (OUTPATIENT)
Dept: BEHAVIORAL HEALTH | Facility: CLINIC | Age: 55
End: 2025-06-12
Payer: COMMERCIAL

## 2025-06-12 VITALS
DIASTOLIC BLOOD PRESSURE: 78 MMHG | OXYGEN SATURATION: 97 % | HEIGHT: 63 IN | SYSTOLIC BLOOD PRESSURE: 124 MMHG | HEART RATE: 82 BPM | WEIGHT: 160 LBS | BODY MASS INDEX: 28.35 KG/M2

## 2025-06-12 DIAGNOSIS — F41.9 ANXIETY: ICD-10-CM

## 2025-06-12 DIAGNOSIS — F33.41 RECURRENT MAJOR DEPRESSIVE DISORDER, IN PARTIAL REMISSION: Primary | ICD-10-CM

## 2025-06-12 NOTE — PROGRESS NOTES
"     Follow Up Office Visit      Patient Name: Mona Wilkerson  : 1970   MRN: [id number]     Referring Provider: Deepti Glez DO    Chief Complaint:      ICD-10-CM ICD-9-CM   1. Recurrent major depressive disorder, in partial remission  F33.41 296.35   2. Anxiety  F41.9 300.00        History of Present Illness:   Mona Wilkerson is a 55 y.o. female who is here today for follow up and medication management    Subjective      Patient Reports:   History of Present Illness  The patient presents for evaluation of depression and sleep apnea.    She reports being in a \"good place\" and attributes her improved well-being to her current job and increased social interaction. She expresses a desire to discontinue Prozac, with the option to resume it if necessary. She has been on Prozac since approximately , initially at a dose of 20 mg, later increased to 40 mg. She does not report any suicidal ideation, homicidal thoughts, or hallucinations.     She mentions that she has mostly abstained from alcohol but occasionally consumes it. She recalls experiencing withdrawal symptoms when she abruptly discontinued Effexor in the past.    She is currently on trazodone for sleep disturbances, which she believes were exacerbated by caffeine consumption. She has been using a CPAP machine for her sleep apnea for nearly a year, which has resulted in improved alertness. She notes that her sleep apnea episodes are fewer when she abstains from alcohol. She is considering reducing her trazodone dosage to 25 mg and taking it earlier in the evening. She typically sleeps for at least 7 hours and feels well-rested upon waking.      Review of Systems:   Review of Systems   Constitutional:  Negative for chills, diaphoresis, fatigue and fever.   HENT:  Negative for congestion and sore throat.    Eyes:  Negative for visual disturbance.   Respiratory:  Negative for cough, chest tightness and shortness of breath.    Cardiovascular:  " Negative for chest pain.   Gastrointestinal:  Negative for abdominal pain, nausea and vomiting.   Genitourinary:  Negative for dysuria.   Musculoskeletal:  Negative for gait problem, myalgias and neck pain.   Skin:  Negative for rash and wound.   Neurological:  Negative for dizziness, tremors, seizures, weakness, light-headedness, numbness and headaches.   Psychiatric/Behavioral:  Negative for agitation, behavioral problems, confusion, hallucinations, self-injury and suicidal ideas. The patient is not hyperactive.      Sleep pattern: 7-8 hours, more well rested  Appetite: decreased appetite     PHQ-9 Depression Screening  Little interest or pleasure in doing things? Not at all   Feeling down, depressed, or hopeless? Not at all   PHQ-2 Total Score 0   Trouble falling or staying asleep, or sleeping too much? Not at all   Feeling tired or having little energy? Not at all   Poor appetite or overeating? Not at all   Feeling bad about yourself - or that you are a failure or have let yourself or your family down? Not at all   Trouble concentrating on things, such as reading the newspaper or watching television? Not at all   Moving or speaking so slowly that other people could have noticed? Or the opposite - being so fidgety or restless that you have been moving around a lot more than usual? Not at all   Thoughts that you would be better off dead, or of hurting yourself in some way? Not at all   PHQ-9 Total Score 0   If you checked off any problems, how difficult have these problems made it for you to do your work, take care of things at home, or get along with other people? Not difficult at all       CARLEY-7 Anxiety Screening  Over the last two weeks, how often have you been bothered by the following problems?  Feeling nervous, anxious or on edge: Not at all  Not being able to stop or control worrying: Not at all  Worrying too much about different things: Not at all  Trouble Relaxing: Not at all  Being so restless that it is  "hard to sit still: Not at all  Becoming easily annoyed or irritable: Not at all  Feeling afraid as if something awful might happen: Not at all  CARLEY 7 Total Score: 0  If you checked any problems, how difficult have these problems made it for you to do your work, take care of things at home, or get along with other people: Not difficult at all    RISK ASSESSMENT:  Patient denies any thoughts or intent of suicide today. Patient denies any impulsive behavior today.     Medications:     Current Outpatient Medications:     estradiol (Vagifem) 10 MCG tablet vaginal tablet, Insert 1 tablet into the vagina 2 (Two) Times a Week., Disp: 24 tablet, Rfl: 3    FLUoxetine (PROzac) 20 MG capsule, Take 1 capsule by mouth Daily., Disp: 30 capsule, Rfl: 0    levothyroxine (SYNTHROID, LEVOTHROID) 50 MCG tablet, Take 1 tablet by mouth Daily., Disp: 90 tablet, Rfl: 0    lisinopril (PRINIVIL,ZESTRIL) 40 MG tablet, Take 1 tablet by mouth Daily., Disp: 90 tablet, Rfl: 3    pantoprazole (PROTONIX) 40 MG EC tablet, Take 1 tablet by mouth Daily. Take 30 to 60 minutes prior to a meal, Disp: 90 tablet, Rfl: 3    traZODone (DESYREL) 50 MG tablet, Take 1 tablet by mouth Every Night., Disp: 90 tablet, Rfl: 3    Medication Considerations:  NEREIDA reviewed and appropriate.      Allergies:   No Known Allergies      Objective     Physical Exam:  Vital Signs:   Vitals:    06/12/25 1612 06/12/25 1614   BP:  124/78   Pulse:  82   SpO2:  97%   Weight: 72.6 kg (160 lb)    Height: 160 cm (62.99\")      Body mass index is 28.35 kg/m².     Mental Status Exam:   Hygiene:   good   Cooperation:  Cooperative  Eye Contact:  Good  Psychomotor Behavior:  Appropriate  Affect:  Appropriate  Mood: normal  Speech:  Normal  Thought Process:  Goal directed and Linear  Thought Content:  Normal  Suicidal:  None  Homicidal:  None  Hallucinations:  None  Delusion:  None  Memory:  Intact  Orientation:  Person, Place, Time, and Situation  Reliability:  good  Insight:  " Good  Judgement:  Good  Impulse Control:  Good  Physical/Medical Issues:  No      Assessment / Plan      Visit Diagnosis/Orders Placed This Visit:  Diagnoses and all orders for this visit:    1. Recurrent major depressive disorder, in partial remission (Primary)  -     FLUoxetine (PROzac) 20 MG capsule; Take 1 capsule by mouth Daily.  Dispense: 30 capsule; Refill: 0    2. Anxiety             Functional Status: No impairment    Prognosis: Good with Ongoing Treatment     Impression/Formulation:  Patient appeared alert and oriented.  Patient is voluntarily requesting to continue outpatient psychiatric treatment at [facility].  Patient is receptive to assistance with maintaining a stable lifestyle.  Patient presents with history of     ICD-10-CM ICD-9-CM   1. Recurrent major depressive disorder, in partial remission  F33.41 296.35   2. Anxiety  F41.9 300.00   .    Reviewed patient's previous provider notes. Reviewed most recent labs. Patient meets DSM V diagnostic criteria for diagnoses. Diagnoses may be updated as more information becomes available.     Assessment & Plan    - Discussed the patient's desire to discontinue Prozac, noting her current well-being and the plan to taper the dosage.  - Explored the patient's experience with sleep apnea and the use of a CPAP machine, noting significant improvements in alertness and reduced episodes.  - Addressed the patient's use of trazodone for sleep, including the desire to reduce its use due to grogginess and potential strategies for doing so.    The patient reports feeling well and is motivated to reduce her medication. She has been on Prozac since 2016 and is currently on a 40 mg dose. She wishes to try discontinuing the medication, and the plan is to taper the dosage to 20 mg for 4 weeks. The patient also reports significant improvement in her sleep apnea symptoms since using a CPAP machine for almost a year. She experiences fewer episodes per  hour and feels more alert. The patient uses trazodone several times a week for sleep but wishes to reduce its use due to grogginess.      Plan:  - Reduce Prozac dosage from 40 mg to 20 mg for 4 weeks.  - [place] with halving trazodone dose to 25 mg.    Follow-up:  - Follow up in 1 month to assess the patient's response to the medication changes and overall well-being.        Patient will continue supportive psychotherapy efforts and medications as indicated. Clinic will obtain release of information for current treatment team for continuity of care as needed. Patient will contact this office, call [phone] or present to the nearest emergency room should suicidal or homicidal ideations occur.  Discussed medication options and treatment plan of prescribed medication(s) as well as the risks, benefits, and potential side effects. Patient acknowledged and verbally consented to continue with current treatment plan and was educated on the importance of compliance with treatment and follow-up appointments.     Patient instructions:  Instructed will take 4-6 weeks for full therapeutic effect. Medication risks and side effects discussed with patient including risk for worsening mood, changes in behavior, thoughts of suicide or homicide, induction of sal, serotonin syndrome.   If any thoughts of SI or HI, worsening mood or changes in behavior, call 911 or crisis line [phone], or go to nearest ER at once.  Pt.verbalizes understanding and consents to treatment with this medication.         Patient or patient representative verbalized consent for the use of Ambient Listening during the visit with  MARIA L Silverio for chart documentation. 6/12/2025  16:53 EDT    MARIA L Silverio, PMHNP-BC [facility]

## 2025-06-18 ENCOUNTER — DOCUMENTATION (OUTPATIENT)
Age: 55
End: 2025-06-18

## 2025-06-18 ENCOUNTER — OUTSIDE FACILITY SERVICE (OUTPATIENT)
Age: 55
End: 2025-06-18
Payer: COMMERCIAL

## 2025-06-18 PROCEDURE — 64721 CARPAL TUNNEL SURGERY: CPT | Performed by: STUDENT IN AN ORGANIZED HEALTH CARE EDUCATION/TRAINING PROGRAM

## 2025-06-18 NOTE — PROGRESS NOTES
ORTHOPEDIC OPERATIVE REPORT    PATIENT NAME: Mona Wilkerson     MRN: [id number]    LOCATION: [facility]    YOB: 1970    DATE OF SURGERY: 06/18/25    PREOPERATIVE DIAGNOSIS:   Left carpal tunnel syndrome    POSTOPERATIVE DIAGNOSIS:  Left carpal tunnel syndrome    PROCEDURE PERFORMED:  Left carpal tunnel release    CPT CODE:  66372    SURGEON: Muna Daniel MD     ASSISTANT: None     ANESTHESIA: 7cc total of  a 50:50 mixture of 0.5% Marcaine mixed with 1% lidocaine with 1:100,000 epinephrine    SPECIMENS: None    TOURNIQUET TIME: 7 minutes    ESTIMATED BLOOD LOSS: Minimal    COMPLICATIONS: None    IMPLANTS: None    INDICATIONS FOR PROCEDURE:  Mona Wilkerson is a 55 year old right hand dominant who presented to clinic with complaints of numbness and tingling to the bilateral hands involving predominantly the thumb, index, and long fingers. Clinical examination was consistent with carpal tunnel syndrome. Electrodiagnostic studies were obtained. These demonstrated moderate left and mild right carpal tunnel syndrome. The patient was offered conservative treatment including nerve gliding exercises, wrist braces, and corticosteroid injection. Despite treatment with bracing and nerve glides and injection, they continued to endorse persistent numbness and tingling. Therefore, they were offered an open carpal tunnel release. After discussion of the risks, benefits, alternatives and prognosis, they elected to proceed with left open carpal tunnel release.    DESCRIPTION OF PROCEDURE:   The patient was identified in the preoperative holding area utilizing two patient identifiers. The operative extremity was marked. A preoperative block was performed using local anesthetic consisting of a 50:50 mixture of 0.5% Marcaine mixed with 1% lidocaine with 1:100,000 epinephrine. A total of 7cc was used and injected proximally to block the palmar cutaneous nerve branch and then superficially along the planned carpal  tunnel incision. They were taken back to the operating room and placed supine on the operative table.  A forearm tourniquet was placed and the operative extremity was prepped and draped in a standard sterile fashion. A surgical time out was performed that confirmed the correct site, procedure and laterality. No preoperative antibiotics were indicated.     An incision was drawn out in line with the radial border of the ring finger starting just proximal to Munson's cardinal line and ending 0.5 cm distal to the distal wrist crease. An esmarch was used to exsanguinate the limb, and the tourniquet was inflated to 250 mmHg.  A 15 blade was used to incise through the skin and subcutaneous tissue. Littler scissors were used to bluntly dissect down to the palmar fascia. A self-retaining blunt retractor was placed, exposing the palmar fascia. A 15 blade was used to incise through the palmar fascia in line with the skin incision.  The ulnar based fat was encountered and elevated from radial to ulnar allowing the self-retaining retractor to be placed deeper, retracting the fat from the surgical field. A palmaris brevis was encountered. The transverse carpal ligament as exposed. A 15 blade was used to incise through the transverse carpal ligament into the carpal tunnel under direct visualization. A combination of scissors and a 15 blade were used to complete the dissection distally until fat was encountered from the superficial palmar arch. Once the distal dissection was confirmed completed, the retractor was reversed and attention was turned proximally. A scalpel was used to incise the transverse carpal ligament proximally under direct visualization. A freer elevator was used to release adhesions from superficial and deep to the transverse carpal ligament proximally. Scissors were used to complete the proximal dissection releasing up to the level of the antebrachial fascia. There was no evidence of a transligamentous recurrent  motor branch.     Once the transverse carpal ligament was fully released, a visual inspection of the carpal tunnel demonstrated a complete release  without evidence additional pathology. The tourniquet was deflated and hemostasis was achieved at the wound edges. The wound was irrigated with normal saline and closed with 4-0 nylon. 4x4 gauze and an ace wrap were placed. All counts were correct at the end of the case.     POSTOPERATIVE PLAN:  No lifting greater than 5 pounds with the operative extremity.  2.  Over the counter Tylenol and/or Advil/Aleve/Motrin for pain control.   3.  Dressings may be removed in 5 days and replaced with a dry daily dressing.  4.  Follow up in 10-14 days as scheduled.    Muna Daniel MD  [facility]

## 2025-06-27 ENCOUNTER — TELEPHONE (OUTPATIENT)
Dept: ORTHOPEDIC SURGERY | Facility: CLINIC | Age: 55
End: 2025-06-27
Payer: COMMERCIAL

## 2025-06-27 NOTE — TELEPHONE ENCOUNTER
I called patient and told her we now use [facility]. I wrote a new script for her and it is in Dr. Marrero's box to sign. Once it is singed I will fax it to [facility]. I gave her their number to call to set up an appointment with them. She will call with any further questions or concerns she may have.  Kacie Snider RT (R), ROT

## 2025-06-27 NOTE — TELEPHONE ENCOUNTER
Caller:  NERY  Relationship to Patient:  SELF  Phone Number: [phone]  Reason For Call: PATIENT STATES THAT IT IS TIME FOR A NEW SET OF ORTHOTICS BUT THE [facility] SHE WAS GETTING THEM FROM IS NO LONGER IN BUSINESS, WOULD LIKE TO KNOW WHO DR SOLIS WOULD SUGGEST AND GET AN ORDER SENT TO THEM

## 2025-06-30 ENCOUNTER — TELEPHONE (OUTPATIENT)
Dept: ORTHOPEDIC SURGERY | Facility: CLINIC | Age: 55
End: 2025-06-30
Payer: COMMERCIAL

## 2025-06-30 NOTE — TELEPHONE ENCOUNTER
Caller:     Phone Number: [phone] (home)     Reason for Call:   PATIENT HAS AN APPOINTMENT ON 7-3-25 POST OPERATION FOR HER LEFT WRIST AND WANTED TO KNOW IF IT WILL BE POSSIBLE IF SHE CAN GET A CORTISONE INJECTION ON HER RIGHT WRIST SAME DAY? IT DOESN'T APPEAR SHE HAS HAD AN INJECTION BEFORE.

## 2025-07-01 NOTE — TELEPHONE ENCOUNTER
[facility] for patient . I told her I didn't think the right steroid injection would be a problem. If  agrees then she can get the injection done at her 7/3/25 post op visit. I told her if she had any questions to call the office back , otherwise we will see her 7/3/25

## 2025-07-03 ENCOUNTER — OFFICE VISIT (OUTPATIENT)
Dept: ORTHOPEDIC SURGERY | Facility: CLINIC | Age: 55
End: 2025-07-03
Payer: COMMERCIAL

## 2025-07-03 VITALS — TEMPERATURE: 97.1 F

## 2025-07-03 DIAGNOSIS — G56.03 BILATERAL CARPAL TUNNEL SYNDROME: Primary | ICD-10-CM

## 2025-07-03 RX ORDER — LIDOCAINE HYDROCHLORIDE 10 MG/ML
0.5 INJECTION, SOLUTION EPIDURAL; INFILTRATION; INTRACAUDAL; PERINEURAL
Status: COMPLETED | OUTPATIENT
Start: 2025-07-03 | End: 2025-07-03

## 2025-07-03 RX ADMIN — LIDOCAINE HYDROCHLORIDE 0.5 ML: 10 INJECTION, SOLUTION EPIDURAL; INFILTRATION; INTRACAUDAL; PERINEURAL at 07:48

## 2025-07-03 NOTE — PROGRESS NOTES
Procedure   - Hand/Upper Extremity Injection: R carpal tunnel for carpal tunnel syndrome on 7/3/2025 7:50 AM  Indications: pain  Details: 27 G needle, volar approach  Medications: 0.5 mL lidocaine PF 1% 1 %; 20 mg triamcinolone acetonide 40 MG/ML  Outcome: tolerated well, no immediate complications  Procedure, treatment alternatives, risks and benefits explained, specific risks discussed. Consent was given by the patient. Immediately prior to procedure a time out was called to verify the correct patient, procedure, equipment, support staff and site/side marked as required. Patient was prepped and draped in the usual sterile fashion.

## 2025-07-03 NOTE — PROGRESS NOTES
Procedure   - Hand/Upper Extremity Injection for carpal tunnel syndrome on 7/3/2025 7:48 AM  Indications: pain  Details: 27 G needle, volar approach  Medications: 0.5 mL lidocaine PF 1% 1 % (.5ml dexamethasone 4mg/mL, NDC 32061-025-46, Lot [id number], exp [id number])  Outcome: tolerated well, no immediate complications  Procedure, treatment alternatives, risks and benefits explained, specific risks discussed. Consent was given by the patient. Immediately prior to procedure a time out was called to verify the correct patient, procedure, equipment, support staff and site/side marked as required. Patient was prepped and draped in the usual sterile fashion.

## 2025-07-03 NOTE — PROGRESS NOTES
[facility]     Post Operative Office Visit      Date: 07/03/2025   Patient Name: Mona Wilkerson  MRN: [id number]  YOB: 1970    Chief Complaint:   Chief Complaint   Patient presents with    Post-op     2 weeks status post Left carpal tunnel release 06/18/2025       History of Present Illness:   Mona Wilkerson is a 55 y.o. female status post left carpal tunnel release, DOS 6/18/2025.  Reports that she has done well since surgery.  She has had complete resolution of her numbness and tingling.  She denies any pain or wound complications.  She does continue to endorse very mild symptoms to the right hand despite nerve glides and bracing.  No other complaints.    Subjective   Review of Systems:   Review of Systems     I reviewed the patient's chief complaint, history of present illness, review of systems, past medical history, surgical history, family history, social history, medications and allergy list in the EMR on 07/03/2025 and agree with the findings above.    Objective    Vital Signs:   Vitals:    07/03/25 0739   Temp: 97.1 °F (36.2 °C)       General Appearance: No acute distress. Alert and oriented.     Ortho Exam:  Examination of the left Upper Extremity:   Skin examination: Healed surgical incision overlying the carpal tunnel.  Sutures are in place without erythema warmth or drainage.  Sutures removed today in clinic and tolerated well.  Nontender around the surgical incision.  Able to make a composite fist  AIN/PIN/Radial/Ulnar nerves grossly motor intact  Median/radial/ulnar sensory intact to light touch   Palpable radial pulse, digits are warm and well-perfused    Examination of the right Upper Extremity:  No skin lesions or ecchymosis. No edema.    Interosseous wasting is not visualized   Thenar atrophy is not visualized     Hypothenar atrophy is not visualized     negative Tinel's at the carpal tunnel,  positive Durkan's compression test, positive Phalen's maneuver   negative Tinel's over Guyon's canal   negative Tinel's at the ulnar nerve at the elbow   negative Elbow Flexion Test   negative Subluxation of the ulnar nerve   negative tenderness with deep palpation of the pronator   negative Tinel's with median nerve at pronator   5/5 APB strength   5/5 FDI strength   Sensation is intact to light touch over the median and ulnar nerve distributions   Digits warm and well-perfused    Imaging / Studies:    Imaging Results (Last 24 Hours)       ** No results found for the last 24 hours. **          EMG Nerve conduction study provided via patient to report: Moderate left and mild right carpal tunnel syndrome     Procedure Note:  After reviewing the risks, benefits and alternatives to a steroid injection, which include but are not limited to; hypopigmentation, fat necrosis/atrophy, pain, swelling, bleeding, bruising, damage to nearby nerves/vessels, allergic reaction , transient elevation in blood glucose levels and infection a verbal consent was obtained. A time-out was then performed and the affected hand was prepped with chlorhexadine soap and ethyl chloride was used to numb the skin. The right carpal tunnel was injected with 0.5cc: 0.5cc mixture of Lidocaine - 1% / 2 ml and dexamethasone 4 mg/mL. The injection was well tolerated and a sterile dressing was applied. There were no complications. I advised the patient that they might experience some local discomfort for the next couple days and can apply ice to the site as needed.    Assessment / Plan    Assessment/Plan:   Mona Wlikerson is a 55 y.o. female status post left carpal tunnel release, DOS 6/18/2025.    Patient has done well since surgery.  She has had significant improvement in her symptoms.  Sutures removed today in clinic and I counseled her on gentle scar massage.  She may return to all of her activities without restriction.  Regarding her right hand,  she continues to be symptomatic despite bracing and nerve glides.  We discussed her options moving forward and she was most interested in a corticosteroid injection today.  This provided tolerated well.  I will see her back as needed if symptoms worsen or fail to improve.  Questions and concerns were addressed.  She is agreeable.      ICD-10-CM ICD-9-CM   1. Bilateral carpal tunnel syndrome  G56.03 354.0     Follow Up:   Return if symptoms worsen or fail to improve.      Muna Daniel MD  [facility]

## 2025-07-09 ENCOUNTER — OFFICE VISIT (OUTPATIENT)
Dept: BEHAVIORAL HEALTH | Facility: CLINIC | Age: 55
End: 2025-07-09
Payer: COMMERCIAL

## 2025-07-09 VITALS
DIASTOLIC BLOOD PRESSURE: 82 MMHG | OXYGEN SATURATION: 98 % | HEART RATE: 74 BPM | SYSTOLIC BLOOD PRESSURE: 136 MMHG | HEIGHT: 63 IN | BODY MASS INDEX: 28.35 KG/M2

## 2025-07-09 DIAGNOSIS — F33.41 RECURRENT MAJOR DEPRESSIVE DISORDER, IN PARTIAL REMISSION: ICD-10-CM

## 2025-07-09 DIAGNOSIS — F41.9 ANXIETY: Primary | ICD-10-CM

## 2025-07-09 RX ORDER — FLUOXETINE 10 MG/1
10 CAPSULE ORAL DAILY
Qty: 30 CAPSULE | Refills: 0 | Status: SHIPPED | OUTPATIENT
Start: 2025-07-09

## 2025-07-09 NOTE — PROGRESS NOTES
"     Follow Up Office Visit      Patient Name: Mona Wilkerson  : 1970   MRN: [id number]     Referring Provider: Deepti Glez DO    Chief Complaint:      ICD-10-CM ICD-9-CM   1. Anxiety  F41.9 300.00   2. Recurrent major depressive disorder, in partial remission  F33.41 296.35        History of Present Illness:   Mona Wilkerson is a 55 y.o. female who is here today for follow up and medication management    Subjective      Patient Reports:   History of Present Illness  The patient presents for evaluation of depression.    She reports a positive response to the reduced dosage of Prozac, with no complaints of headaches or stomachaches. Her mood remains stable, and she does not experience any suicidal ideation, thoughts of harming others, or hallucinations. She is not experiencing manic episodes. She is planning a trip next week and recently enjoyed a vacation in [place], which she believes has positively impacted her mood. She expresses a desire to further reduce her Prozac dosage to 10 mg. She has been on a regimen of Prozac 20 mg in the morning for several months.    Initially, she experienced some sleep disturbances, which were managed by adjusting her trazodone dosage from 50 mg to 100 mg for a few nights. She also takes two Benadryl tablets as needed. She gets approximately 7 to 8 hours of sleep per night and no longer struggles with waking up during the night. She occasionally consumes alcohol and has not noticed any changes in her appetite. She adjusts her trazodone dosage based on her sleep quality and caffeine intake.    She reports feeling \"great\" overall with a slight initial problem with sleep when the Prozac dosage was reduced. She feels less \"flat\" and believes the Prozac may have been contributing to this feeling. She has not felt manic and has been maintaining a stable mood.    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   HENT:  Negative for congestion and sore throat.    Eyes:  Negative for visual disturbance.   Respiratory:  Negative for cough, chest tightness and shortness of breath.    Cardiovascular:  Negative for chest pain.   Gastrointestinal:  Negative for abdominal pain, nausea and vomiting.   Genitourinary:  Negative for dysuria.   Musculoskeletal:  Negative for gait problem, myalgias and neck pain.   Skin:  Negative for rash and wound.   Neurological:  Negative for dizziness, tremors, seizures, weakness, light-headedness, numbness and headaches.   Psychiatric/Behavioral:  Positive for sleep disturbance. Negative for agitation, behavioral problems, confusion, hallucinations, self-injury and suicidal ideas. The patient is not hyperactive.      Sleep pattern: 7-8 hours, sleeping through the night with trazodone and cpap  Appetite: no changes     PHQ-9 Depression Screening  Little interest or pleasure in doing things? Not at all   Feeling down, depressed, or hopeless? Not at all   PHQ-2 Total Score 0   Trouble falling or staying asleep, or sleeping too much? Not at all   Feeling tired or having little energy? Not at all   Poor appetite or overeating? Not at all   Feeling bad about yourself - or that you are a failure or have let yourself or your family down? Not at all   Trouble concentrating on things, such as reading the newspaper or watching television? Not at all   Moving or speaking so slowly that other people could have noticed? Or the opposite - being so fidgety or restless that you have been moving around a lot more than usual? Not at all   Thoughts that you would be better off dead, or of hurting yourself in some way? Not at all   PHQ-9 Total Score 0   If you checked off any problems, how difficult have these problems made it for you to do your work, take care of  "things at home, or get along with other people? Not difficult at all       CARLEY-7 Anxiety Screening  Over the last two weeks, how often have you been bothered by the following problems?  Feeling nervous, anxious or on edge: Not at all  Not being able to stop or control worrying: Not at all  Worrying too much about different things: Not at all  Trouble Relaxing: Not at all  Being so restless that it is hard to sit still: Not at all  Becoming easily annoyed or irritable: Not at all  Feeling afraid as if something awful might happen: Not at all  CARLEY 7 Total Score: 0  If you checked any problems, how difficult have these problems made it for you to do your work, take care of things at home, or get along with other people: Not difficult at all    RISK ASSESSMENT:  Patient denies any thoughts or intent of suicide today. Patient denies any impulsive behavior today.     Medications:     Current Outpatient Medications:     estradiol (Vagifem) 10 MCG tablet vaginal tablet, Insert 1 tablet into the vagina 2 (Two) Times a Week., Disp: 24 tablet, Rfl: 3    levothyroxine (SYNTHROID, LEVOTHROID) 50 MCG tablet, Take 1 tablet by mouth Daily., Disp: 90 tablet, Rfl: 0    lisinopril (PRINIVIL,ZESTRIL) 40 MG tablet, Take 1 tablet by mouth Daily., Disp: 90 tablet, Rfl: 3    pantoprazole (PROTONIX) 40 MG EC tablet, Take 1 tablet by mouth Daily. Take 30 to 60 minutes prior to a meal, Disp: 90 tablet, Rfl: 3    traZODone (DESYREL) 50 MG tablet, Take 1 tablet by mouth Every Night., Disp: 90 tablet, Rfl: 3    FLUoxetine (PROzac) 10 MG capsule, Take 1 capsule by mouth Daily., Disp: 30 capsule, Rfl: 0    Medication Considerations:  NEREIDA reviewed and appropriate.      Allergies:   No Known Allergies       Objective     Physical Exam:  Vital Signs:   Vitals:    07/09/25 1630 07/09/25 1632   BP:  136/82   Pulse:  74   SpO2:  98%   Height: 160 cm (62.99\")      Body mass index is 28.35 kg/m².     Mental Status Exam:   Hygiene:   good   Cooperation: "  Cooperative  Eye Contact:  Good  Psychomotor Behavior:  Appropriate  Affect:  Appropriate  Mood: normal  Speech:  Normal  Thought Process:  Goal directed and Linear  Thought Content:  Normal  Suicidal:  None  Homicidal:  None  Hallucinations:  None  Delusion:  None  Memory:  Intact  Orientation:  Person, Place, Time, and Situation  Reliability:  good  Insight:  Good  Judgement:  Good  Impulse Control:  Good  Physical/Medical Issues:  see problem list     Assessment / Plan      Visit Diagnosis/Orders Placed This Visit:  Diagnoses and all orders for this visit:    1. Anxiety (Primary)  -     FLUoxetine (PROzac) 10 MG capsule; Take 1 capsule by mouth Daily.  Dispense: 30 capsule; Refill: 0    2. Recurrent major depressive disorder, in partial remission  -     FLUoxetine (PROzac) 10 MG capsule; Take 1 capsule by mouth Daily.  Dispense: 30 capsule; Refill: 0         Functional Status: No impairment    Prognosis: Good with Ongoing Treatment     Impression/Formulation:  Patient appeared alert and oriented.  Patient is voluntarily requesting to continue outpatient psychiatric treatment at [facility].  Patient is receptive to assistance with maintaining a stable lifestyle.  Patient presents with history of     ICD-10-CM ICD-9-CM   1. Anxiety  F41.9 300.00   2. Recurrent major depressive disorder, in partial remission  F33.41 296.35   .    Reviewed patient's previous provider notes. Reviewed most recent labs. Patient meets DSM V diagnostic criteria for diagnoses. Diagnoses may be updated as more information becomes available.     Assessment & Plan  The discussion focused on the patient's current medication regimen and its effects. The patient reported doing well overall but experiencing some sleep disturbances. The reduction of Prozac was discussed, and the patient expressed a desire to decrease the dosage further. The patient also mentioned occasional use of trazodone and Benadryl to manage sleep  issues. The conversation included exploring the patient's feelings about the medication changes and their impact on mood and sleep.    The patient appears to be responding well to the reduction in Prozac, with no significant mood disturbances or adverse effects reported. There are no suicidal ideations or thoughts of harming others. The patient reported a slight shift in sleep patterns, which is being managed with trazodone and Benadryl. Overall, the patient's mental health is stable, and she is optimistic about further reducing the Prozac dosage.      Plan:  - Reduce Prozac dosage to 10 mg.  - Continue current trazodone regimen (50 mg, with occasional increase to 100 mg as needed).  - Monitor sleep patterns and report any persistent disturbances.  - Contact the office if any adverse effects or worsening symptoms occur.    Follow-up:  - Schedule a follow-up appointment in one month to assess the patient's response to the new Prozac dosage and overall mental health.        Patient will continue supportive psychotherapy efforts and medications as indicated. Clinic will obtain release of information for current treatment team for continuity of care as needed. Patient will contact this office, call [phone] or present to the nearest emergency room should suicidal or homicidal ideations occur.  Discussed medication options and treatment plan of prescribed medication(s) as well as the risks, benefits, and potential side effects. Patient acknowledged and verbally consented to continue with current treatment plan and was educated on the importance of compliance with treatment and follow-up appointments.     Patient instructions:  Instructed will take 4-6 weeks for full therapeutic effect. Medication risks and side effects discussed with patient including risk for worsening mood, changes in behavior, thoughts of suicide or homicide, induction of sal, serotonin syndrome, rare hyponatremia, rare SIADH, withdrawal symptoms if  abrupt withdrawal, sexual dysfunction.   If any thoughts of SI or HI, worsening mood or changes in behavior, call 911 or crisis line [phone], or go to nearest ER at once. Pt.verbalizes understanding and consents to treatment with this medication.         Patient or patient representative verbalized consent for the use of Ambient Listening during the visit with  MARIA L Silverio for chart documentation. 7/9/2025  16:48 EDT    MARIA L Silverio, PMHNP-BC [facility]

## 2025-07-22 ENCOUNTER — TELEPHONE (OUTPATIENT)
Dept: MRI IMAGING | Facility: HOSPITAL | Age: 55
End: 2025-07-22
Payer: COMMERCIAL

## 2025-07-31 ENCOUNTER — TELEPHONE (OUTPATIENT)
Dept: ORTHOPEDIC SURGERY | Facility: CLINIC | Age: 55
End: 2025-07-31
Payer: COMMERCIAL

## 2025-07-31 DIAGNOSIS — Z91.89 AT HIGH RISK FOR BREAST CANCER: Primary | ICD-10-CM

## 2025-07-31 DIAGNOSIS — Z12.39 SCREENING FOR BREAST CANCER USING NON-MAMMOGRAM MODALITY: ICD-10-CM

## 2025-07-31 NOTE — TELEPHONE ENCOUNTER
Patient called in and left a message on the surgery scheduling line; She was inquiring about setting up surgery for her right carpal tunnel with Dr. Daniel; Requested first week of September vs last week of December. Requested a call back.    Zac PATEL CMA ([facility]), ROT

## 2025-07-31 NOTE — TELEPHONE ENCOUNTER
"Spoke to Dr. Daniel in office who states patient will have to wait 3 months from her last injection before proceeding with surgery and since that would fall during her time off, she recommends patient come in for an office visit upon her return in December and they can finalize plans for her right carpal tunnel release to follow shortly after.    Called pt to give her the above info. Patient is needing to get her surgery done before the end of the year since her deductible has been met. She was agreeable to coming in on December 16 (she is on vacation from 12/7-12/15) to see Dr. Daniel at the [facility]. Lily is going to put her down as \"tentative\" R [facility] on that Friday 12/19 to ensure we can get her surgery done before the end of the year.    Patient verbalized understanding and expressed appreciation for the above plan.    Zac PATEL CMA ([facility]), ROT    "

## 2025-08-19 ENCOUNTER — TELEPHONE (OUTPATIENT)
Dept: GENETICS | Facility: HOSPITAL | Age: 55
End: 2025-08-19
Payer: COMMERCIAL

## 2025-08-20 ENCOUNTER — LAB (OUTPATIENT)
Dept: LAB | Facility: HOSPITAL | Age: 55
End: 2025-08-20
Payer: COMMERCIAL

## 2025-08-20 ENCOUNTER — CLINICAL SUPPORT (OUTPATIENT)
Dept: GENETICS | Facility: HOSPITAL | Age: 55
End: 2025-08-20
Payer: COMMERCIAL

## 2025-08-20 DIAGNOSIS — Z80.3 FAMILY HISTORY OF MALIGNANT NEOPLASM OF BREAST: ICD-10-CM

## 2025-08-20 DIAGNOSIS — Z80.51 FAMILY HISTORY OF KIDNEY CANCER: ICD-10-CM

## 2025-08-20 DIAGNOSIS — Z84.89 FAMILY HISTORY OF BRAIN TUMOR: ICD-10-CM

## 2025-08-20 DIAGNOSIS — Z13.79 GENETIC TESTING: Primary | ICD-10-CM

## 2025-08-20 PROCEDURE — 36415 COLL VENOUS BLD VENIPUNCTURE: CPT

## 2025-08-20 PROCEDURE — 96041 GENETIC COUNSELING SVC EA 30: CPT

## 2025-08-23 DIAGNOSIS — E03.9 ACQUIRED HYPOTHYROIDISM: ICD-10-CM

## 2025-08-25 RX ORDER — LEVOTHYROXINE SODIUM 50 UG/1
50 TABLET ORAL DAILY
Qty: 90 TABLET | Refills: 0 | Status: SHIPPED | OUTPATIENT
Start: 2025-08-25

## 2025-08-30 LAB
NCCN CRITERIA FLAG: ABNORMAL
TYRER CUZICK SCORE: 25.2